# Patient Record
Sex: FEMALE | Race: WHITE | Employment: UNEMPLOYED | URBAN - METROPOLITAN AREA
[De-identification: names, ages, dates, MRNs, and addresses within clinical notes are randomized per-mention and may not be internally consistent; named-entity substitution may affect disease eponyms.]

---

## 2020-02-24 ENCOUNTER — HOSPITAL ENCOUNTER (OUTPATIENT)
Age: 35
Setting detail: SPECIMEN
Discharge: HOME | End: 2020-02-24

## 2020-02-24 ENCOUNTER — OFFICE VISIT (OUTPATIENT)
Dept: OBGYN CLINIC | Age: 35
End: 2020-02-24

## 2020-02-24 VITALS
SYSTOLIC BLOOD PRESSURE: 118 MMHG | HEIGHT: 67 IN | BODY MASS INDEX: 45.99 KG/M2 | DIASTOLIC BLOOD PRESSURE: 80 MMHG | WEIGHT: 293 LBS

## 2020-02-24 LAB
-: ABNORMAL
AMORPHOUS: ABNORMAL
BACTERIA: ABNORMAL
BILIRUBIN URINE: ABNORMAL
CASTS UA: ABNORMAL /LPF
COLOR: ABNORMAL
COMMENT UA: ABNORMAL
CRYSTALS, UA: ABNORMAL /HPF
EPITHELIAL CELLS UA: ABNORMAL /HPF
GLUCOSE URINE: NEGATIVE
KETONES, URINE: NEGATIVE
LEUKOCYTE ESTERASE, URINE: NEGATIVE
MUCUS: ABNORMAL
NITRITE, URINE: NEGATIVE
OTHER OBSERVATIONS UA: ABNORMAL
PH UA: 5.5 (ref 5–8)
PROTEIN UA: NEGATIVE
RBC UA: ABNORMAL /HPF
RENAL EPITHELIAL, UA: ABNORMAL /HPF
SPECIFIC GRAVITY UA: 1.03 (ref 1–1.03)
TRICHOMONAS: ABNORMAL
TURBIDITY: CLEAR
URINE HGB: NEGATIVE
UROBILINOGEN, URINE: NORMAL
WBC UA: ABNORMAL /HPF
YEAST: ABNORMAL

## 2020-02-24 PROCEDURE — 81001 URINALYSIS AUTO W/SCOPE: CPT

## 2020-02-24 PROCEDURE — 99385 PREV VISIT NEW AGE 18-39: CPT | Performed by: NURSE PRACTITIONER

## 2020-02-24 PROCEDURE — G0145 SCR C/V CYTO,THINLAYER,RESCR: HCPCS

## 2020-02-24 PROCEDURE — 87624 HPV HI-RISK TYP POOLED RSLT: CPT

## 2020-02-24 ASSESSMENT — PATIENT HEALTH QUESTIONNAIRE - PHQ9
SUM OF ALL RESPONSES TO PHQ QUESTIONS 1-9: 0
SUM OF ALL RESPONSES TO PHQ9 QUESTIONS 1 & 2: 0
2. FEELING DOWN, DEPRESSED OR HOPELESS: 0
SUM OF ALL RESPONSES TO PHQ QUESTIONS 1-9: 0
1. LITTLE INTEREST OR PLEASURE IN DOING THINGS: 0

## 2020-02-24 NOTE — PROGRESS NOTES
Past Surgical History:   Procedure Laterality Date     SECTION  2005     SECTION  2008    CHOLECYSTECTOMY  2000    KNEE SURGERY Left 2012    SHOULDER SURGERY Right 2017    tendon tear    WISDOM TOOTH EXTRACTION       Family History   Problem Relation Age of Onset    Diabetes Maternal Grandmother     Cervical Cancer Maternal Grandmother     Diabetes Mother      Social History     Socioeconomic History    Marital status:      Spouse name: Not on file    Number of children: Not on file    Years of education: Not on file    Highest education level: Not on file   Occupational History    Not on file   Social Needs    Financial resource strain: Not on file    Food insecurity:     Worry: Not on file     Inability: Not on file    Transportation needs:     Medical: Not on file     Non-medical: Not on file   Tobacco Use    Smoking status: Never Smoker    Smokeless tobacco: Never Used   Substance and Sexual Activity    Alcohol use: Not Currently    Drug use: Never    Sexual activity: Yes     Partners: Male   Lifestyle    Physical activity:     Days per week: Not on file     Minutes per session: Not on file    Stress: Not on file   Relationships    Social connections:     Talks on phone: Not on file     Gets together: Not on file     Attends Yarsani service: Not on file     Active member of club or organization: Not on file     Attends meetings of clubs or organizations: Not on file     Relationship status: Not on file    Intimate partner violence:     Fear of current or ex partner: Not on file     Emotionally abused: Not on file     Physically abused: Not on file     Forced sexual activity: Not on file   Other Topics Concern    Not on file   Social History Narrative    Not on file       MEDICATIONS:  No current outpatient medications on file. No current facility-administered medications for this visit.             ALLERGIES:  Allergies as of 02/24/2020 - Review Complete 02/24/2020   Allergen Reaction Noted    Azithromycin Anaphylaxis 11/20/2019    Penicillins Anaphylaxis and Hives 11/20/2019    Propoxyphene Anaphylaxis 11/20/2019    Bupropion Hives 11/20/2019    Cephalexin  11/20/2019    Levofloxacin Hives 11/20/2019    Paroxetine hcl Hives 11/20/2019    Rofecoxib  11/20/2019    Zoloft [sertraline hcl]  02/24/2020       Symptoms of decreased mood absent  Symptoms of anhedonia absent    **If either question is answered in a  positive fashion then complete the PHQ9 Scoring Evaluation and make the appropriate referral**      Immunization status: stated as current, but no records available. Gynecologic History:  Menarche: 15 yo  Menopause at [address] [place] yo     Patient's last menstrual period was 02/20/2020. Sexually Active: Yes    STD History: No     Permanent Sterilization: No   Reversible Birth Control: No        Hormone Replacement Exposure: No      Genetic Qualified Family History of Breast, Ovarian , Colon or Uterine Cancer: No     If YES see scanned worksheet. Preventative Health Testing:    Health Maintenance:  Health Maintenance Due   Topic Date Due    Varicella vaccine (1 of 2 - 2-dose childhood series) 03/18/1986    DTaP/Tdap/Td vaccine (1 - Tdap) 03/18/1996    HIV screen  03/18/2000    Cervical cancer screen  03/18/2006    Flu vaccine (1) 09/01/2019       Date of Last Pap Smear: 11 years ago  Abnormal Pap Smear History: denies- no records available  Colposcopy History:   Date of Last Mammogram: NA  Date of Last Colonoscopy:   Date of Last Bone Density:      ________________________________________________________________________        REVIEW OF SYSTEMS:    yes   A minimum of an eleven point review of systems was completed. Review Of Systems (11 point):  Constitutional: No fever, chills or malaise;  No weight change or fatigue  Head and Eyes: No vision, Headache, Dizziness or trauma in last 12 months  ENT ROS: No hearing, Orders   1. Visit for gynecologic examination  PAP SMEAR   2. Screening for HPV (human papillomavirus)  PAP SMEAR   3. Menorrhagia with irregular cycle  US Pelvis Complete    US Non OB Transvaginal    CBC Auto Differential    TSH with Reflex    HCG, Quantitative, Pregnancy    APTT    Protime-INR    Follicle Stimulating Hormone    Luteinizing Hormone    Glucose, Fasting    Insulin, total    ALT    AST    BUN & Creatinine   4. UTI symptoms  Urinalysis Reflex to Culture   5. Abnormal skin growth            Chief Complaint   Patient presents with    Gynecologic Exam          Past Medical History:   Diagnosis Date    Arthritis     Disc disease, degenerative, lumbar or lumbosacral 2002    Fibromyalgia 2012    Migraines          There are no active problems to display for this patient. Hereditary Breast, Ovarian, Colon and Uterine Cancer screening Done. Tobacco & Secondary smoke risks reviewed; instructed on cessation and avoidance      Counseling Completed:  Preventative Health Recommendations and Follow up. The patient was informed of the recommended preventative health recommendations. 1. Annuals every year; Cytology collections per prevailing guidelines. 2. Mammograms begin every year at 37 yo if no abnormalities are found and no family     History. 3. Bone density studies every 2-3 years. Begin at 73 yo. If no fracture history or osteoporosis family history. (significant). 4. Colonoscopy begin at 40 yo. Repeat every ten years if negative and no family history. 5. Calcium of 7311-6583 mg/day in split dosing  6. Vitamin D 400-800 IU/day  7. All other preventative health recommendations will be managed by the patients Primary care physician. PLAN:  Return in about 4 weeks (around 3/23/2020) for DR Rodríguez/ heavy menses/evaluate removal of mass. Pap smear obtained. Pelvic ultrasound ordered. Lab slips given.   Follow up with physician to discuss irregular menses and to

## 2020-02-26 LAB
HPV SAMPLE: NORMAL
HPV, GENOTYPE 16: NOT DETECTED
HPV, GENOTYPE 18: NOT DETECTED
HPV, HIGH RISK OTHER: NOT DETECTED
HPV, INTERPRETATION: NORMAL
SPECIMEN DESCRIPTION: NORMAL

## 2020-03-03 LAB — CYTOLOGY REPORT: NORMAL

## 2020-06-09 ENCOUNTER — HOSPITAL ENCOUNTER (OUTPATIENT)
Age: 35
Discharge: HOME | End: 2020-06-09
Payer: COMMERCIAL

## 2020-06-09 LAB
ABSOLUTE EOS #: 0.19 K/UL (ref 0–0.44)
ABSOLUTE IMMATURE GRANULOCYTE: 0.05 K/UL (ref 0–0.3)
ABSOLUTE LYMPH #: 3.46 K/UL (ref 1.1–3.7)
ABSOLUTE MONO #: 0.75 K/UL (ref 0.1–1.2)
ALT SERPL-CCNC: 12 U/L (ref 5–33)
AST SERPL-CCNC: 10 U/L
BASOPHILS # BLD: 0 % (ref 0–2)
BASOPHILS ABSOLUTE: 0.05 K/UL (ref 0–0.2)
BUN BLDV-MCNC: 11 MG/DL (ref 6–20)
CREAT SERPL-MCNC: 0.66 MG/DL (ref 0.5–0.9)
DIFFERENTIAL TYPE: ABNORMAL
EOSINOPHILS RELATIVE PERCENT: 2 % (ref 1–4)
FOLLICLE STIMULATING HORMONE: 4.3 U/L (ref 1.7–21.5)
GFR AFRICAN AMERICAN: >60 ML/MIN
GFR NON-AFRICAN AMERICAN: >60 ML/MIN
GFR SERPL CREATININE-BSD FRML MDRD: NORMAL ML/MIN/{1.73_M2}
GFR SERPL CREATININE-BSD FRML MDRD: NORMAL ML/MIN/{1.73_M2}
GLUCOSE FASTING: 150 MG/DL (ref 70–99)
HCG QUANTITATIVE: <1 IU/L
HCT VFR BLD CALC: 41.2 % (ref 36.3–47.1)
HEMOGLOBIN: 12.2 G/DL (ref 11.9–15.1)
IMMATURE GRANULOCYTES: 0 %
INR BLD: 0.9
INSULIN COMMENT: NORMAL
INSULIN REFERENCE RANGE:: NORMAL
INSULIN: 54.3 MU/L
LH: 4.5 U/L (ref 1–95.6)
LYMPHOCYTES # BLD: 27 % (ref 24–43)
MCH RBC QN AUTO: 24.8 PG (ref 25.2–33.5)
MCHC RBC AUTO-ENTMCNC: 29.6 G/DL (ref 28.4–34.8)
MCV RBC AUTO: 83.9 FL (ref 82.6–102.9)
MONOCYTES # BLD: 6 % (ref 3–12)
NRBC AUTOMATED: 0 PER 100 WBC
PARTIAL THROMBOPLASTIN TIME: 29.8 SEC (ref 21.3–31.3)
PDW BLD-RTO: 15.2 % (ref 11.8–14.4)
PLATELET # BLD: 332 K/UL (ref 138–453)
PLATELET ESTIMATE: ABNORMAL
PMV BLD AUTO: 9.2 FL (ref 8.1–13.5)
PROTHROMBIN TIME: 9.5 SEC (ref 9.4–12.6)
RBC # BLD: 4.91 M/UL (ref 3.95–5.11)
RBC # BLD: ABNORMAL 10*6/UL
SEG NEUTROPHILS: 65 % (ref 36–65)
SEGMENTED NEUTROPHILS ABSOLUTE COUNT: 8.15 K/UL (ref 1.5–8.1)
TSH SERPL DL<=0.05 MIU/L-ACNC: 1.13 MIU/L (ref 0.3–5)
WBC # BLD: 12.7 K/UL (ref 3.5–11.3)
WBC # BLD: ABNORMAL 10*3/UL

## 2020-06-09 PROCEDURE — 84702 CHORIONIC GONADOTROPIN TEST: CPT

## 2020-06-09 PROCEDURE — 84443 ASSAY THYROID STIM HORMONE: CPT

## 2020-06-09 PROCEDURE — 84460 ALANINE AMINO (ALT) (SGPT): CPT

## 2020-06-09 PROCEDURE — 83525 ASSAY OF INSULIN: CPT

## 2020-06-09 PROCEDURE — 36415 COLL VENOUS BLD VENIPUNCTURE: CPT

## 2020-06-09 PROCEDURE — 83002 ASSAY OF GONADOTROPIN (LH): CPT

## 2020-06-09 PROCEDURE — 84520 ASSAY OF UREA NITROGEN: CPT

## 2020-06-09 PROCEDURE — 84450 TRANSFERASE (AST) (SGOT): CPT

## 2020-06-09 PROCEDURE — 85025 COMPLETE CBC W/AUTO DIFF WBC: CPT

## 2020-06-09 PROCEDURE — 82565 ASSAY OF CREATININE: CPT

## 2020-06-09 PROCEDURE — 82947 ASSAY GLUCOSE BLOOD QUANT: CPT

## 2020-06-09 PROCEDURE — 85610 PROTHROMBIN TIME: CPT

## 2020-06-09 PROCEDURE — 83001 ASSAY OF GONADOTROPIN (FSH): CPT

## 2020-06-09 PROCEDURE — 85730 THROMBOPLASTIN TIME PARTIAL: CPT

## 2020-06-10 ENCOUNTER — TELEPHONE (OUTPATIENT)
Dept: OBGYN CLINIC | Age: 35
End: 2020-06-10

## 2020-06-11 ENCOUNTER — TELEPHONE (OUTPATIENT)
Dept: OBGYN CLINIC | Age: 35
End: 2020-06-11

## 2020-06-11 NOTE — TELEPHONE ENCOUNTER
----- Message from Jannet Severs, APRN - CNP sent at 6/10/2020  7:36 AM EDT -----  Elevated fasting glucose  Please order hgb a1c. Probable insulin resistant. Please fax results to her PCP and have patient follow up with PCP. If patient is still having heavy irregular menses- have patient complete pelvic ultrasound as ordered previously and set up appointment with physician to discuss plan of care.

## 2020-06-22 ENCOUNTER — OFFICE VISIT (OUTPATIENT)
Dept: OBGYN CLINIC | Age: 35
End: 2020-06-22
Payer: COMMERCIAL

## 2020-06-22 PROCEDURE — 76856 US EXAM PELVIC COMPLETE: CPT | Performed by: OBSTETRICS & GYNECOLOGY

## 2020-06-22 PROCEDURE — 76830 TRANSVAGINAL US NON-OB: CPT | Performed by: OBSTETRICS & GYNECOLOGY

## 2020-07-07 ENCOUNTER — OFFICE VISIT (OUTPATIENT)
Dept: FAMILY MEDICINE CLINIC | Age: 35
End: 2020-07-07
Payer: COMMERCIAL

## 2020-07-07 VITALS
SYSTOLIC BLOOD PRESSURE: 124 MMHG | WEIGHT: 293 LBS | HEIGHT: 67 IN | DIASTOLIC BLOOD PRESSURE: 85 MMHG | BODY MASS INDEX: 45.99 KG/M2 | HEART RATE: 95 BPM | RESPIRATION RATE: 16 BRPM | TEMPERATURE: 97.4 F

## 2020-07-07 PROBLEM — G89.29 CHRONIC LOW BACK PAIN WITHOUT SCIATICA: Status: ACTIVE | Noted: 2020-07-07

## 2020-07-07 PROBLEM — Z90.49 HX LAPAROSCOPIC CHOLECYSTECTOMY: Status: ACTIVE | Noted: 2020-07-07

## 2020-07-07 PROBLEM — E28.2 PCOS (POLYCYSTIC OVARIAN SYNDROME): Status: ACTIVE | Noted: 2020-07-07

## 2020-07-07 PROBLEM — Z98.891 HX OF CESAREAN SECTION: Status: ACTIVE | Noted: 2020-07-07

## 2020-07-07 PROBLEM — Z83.3 FAMILY HISTORY OF DIABETES MELLITUS (DM): Status: ACTIVE | Noted: 2020-07-07

## 2020-07-07 PROBLEM — Z80.0 FAMILY HISTORY OF COLON CANCER IN FATHER: Status: ACTIVE | Noted: 2020-07-07

## 2020-07-07 PROBLEM — F41.9 ANXIETY: Status: ACTIVE | Noted: 2020-07-07

## 2020-07-07 PROBLEM — K08.409 HISTORY OF THIRD MOLAR TOOTH EXTRACTION: Status: ACTIVE | Noted: 2020-07-07

## 2020-07-07 PROBLEM — G43.009 MIGRAINE WITHOUT AURA AND WITHOUT STATUS MIGRAINOSUS, NOT INTRACTABLE: Status: ACTIVE | Noted: 2020-07-07

## 2020-07-07 PROBLEM — M54.50 CHRONIC LOW BACK PAIN WITHOUT SCIATICA: Status: ACTIVE | Noted: 2020-07-07

## 2020-07-07 LAB — HBA1C MFR BLD: 7 %

## 2020-07-07 PROCEDURE — 99204 OFFICE O/P NEW MOD 45 MIN: CPT | Performed by: PHYSICIAN ASSISTANT

## 2020-07-07 PROCEDURE — 1036F TOBACCO NON-USER: CPT | Performed by: PHYSICIAN ASSISTANT

## 2020-07-07 PROCEDURE — G8427 DOCREV CUR MEDS BY ELIG CLIN: HCPCS | Performed by: PHYSICIAN ASSISTANT

## 2020-07-07 PROCEDURE — 83036 HEMOGLOBIN GLYCOSYLATED A1C: CPT | Performed by: PHYSICIAN ASSISTANT

## 2020-07-07 PROCEDURE — G8417 CALC BMI ABV UP PARAM F/U: HCPCS | Performed by: PHYSICIAN ASSISTANT

## 2020-07-07 RX ORDER — MELOXICAM 15 MG/1
TABLET ORAL
COMMUNITY
End: 2020-10-08 | Stop reason: ALTCHOICE

## 2020-07-07 RX ORDER — CETIRIZINE HYDROCHLORIDE 10 MG/1
CAPSULE, LIQUID FILLED ORAL
COMMUNITY
End: 2020-08-05

## 2020-07-07 RX ORDER — CYCLOBENZAPRINE HCL 10 MG
TABLET ORAL
COMMUNITY
End: 2021-03-01 | Stop reason: SDUPTHER

## 2020-07-07 RX ORDER — AMITRIPTYLINE HYDROCHLORIDE 10 MG/1
TABLET, FILM COATED ORAL
COMMUNITY
End: 2020-08-05

## 2020-07-07 RX ORDER — SUMATRIPTAN 100 MG/1
TABLET, FILM COATED ORAL
COMMUNITY
End: 2021-08-13 | Stop reason: SDUPTHER

## 2020-07-07 RX ORDER — CITALOPRAM 20 MG/1
TABLET ORAL
COMMUNITY
End: 2020-08-05

## 2020-07-07 RX ORDER — HYDROCODONE BITARTRATE AND ACETAMINOPHEN 10; 325 MG/1; MG/1
TABLET ORAL
COMMUNITY
End: 2021-03-01 | Stop reason: ALTCHOICE

## 2020-07-07 SDOH — ECONOMIC STABILITY: FOOD INSECURITY: WITHIN THE PAST 12 MONTHS, YOU WORRIED THAT YOUR FOOD WOULD RUN OUT BEFORE YOU GOT MONEY TO BUY MORE.: NEVER TRUE

## 2020-07-07 SDOH — ECONOMIC STABILITY: FOOD INSECURITY: WITHIN THE PAST 12 MONTHS, THE FOOD YOU BOUGHT JUST DIDN'T LAST AND YOU DIDN'T HAVE MONEY TO GET MORE.: NEVER TRUE

## 2020-07-07 SDOH — ECONOMIC STABILITY: INCOME INSECURITY: HOW HARD IS IT FOR YOU TO PAY FOR THE VERY BASICS LIKE FOOD, HOUSING, MEDICAL CARE, AND HEATING?: NOT HARD AT ALL

## 2020-07-07 SDOH — ECONOMIC STABILITY: TRANSPORTATION INSECURITY
IN THE PAST 12 MONTHS, HAS THE LACK OF TRANSPORTATION KEPT YOU FROM MEDICAL APPOINTMENTS OR FROM GETTING MEDICATIONS?: NO

## 2020-07-07 SDOH — ECONOMIC STABILITY: TRANSPORTATION INSECURITY
IN THE PAST 12 MONTHS, HAS LACK OF TRANSPORTATION KEPT YOU FROM MEETINGS, WORK, OR FROM GETTING THINGS NEEDED FOR DAILY LIVING?: NO

## 2020-07-07 ASSESSMENT — ENCOUNTER SYMPTOMS
RHINORRHEA: 0
PHOTOPHOBIA: 0
SHORTNESS OF BREATH: 0
CHEST TIGHTNESS: 0
SINUS PAIN: 0
SORE THROAT: 0
VOMITING: 0
SINUS PRESSURE: 0
EYE REDNESS: 0
ANAL BLEEDING: 0
COUGH: 0
BLOOD IN STOOL: 0
COLOR CHANGE: 0
BACK PAIN: 1
RECTAL PAIN: 0
NAUSEA: 0
WHEEZING: 0
DIARRHEA: 0
CONSTIPATION: 0
TROUBLE SWALLOWING: 0
ABDOMINAL DISTENTION: 0
ABDOMINAL PAIN: 0

## 2020-07-07 NOTE — PROGRESS NOTES
[address] PRIMARY CARE  [address] [place] [address] [address] [place] [address]  Dept: [phone]  Dept Fax: [phone]    New Patient Visit Note  Date of patient's visit: 7/7/2020  Patient's Name:  Justo Meyer YOB: 1985            [facility] [place] [address] [place]  ______________________________________________________________________    Reason for visit: Establish care/Preventative care  ______________________________________________________________________  Chief Compliant   Establish Care      ______________________________________________________________________  History of Presenting Illness:  History was obtained from the patient. Justo Meyer is a 28 y.o. is here to establish care. Chronic back pain, PCOS, anxiety, migraines, morbid obesity. Patient has had chronic back pain which is unchanged. She has been taking Flexeril and hydrocodone consistently with some relief. She denies any loss of bladder or bowel control or numbness tingling or weakness in the lower extremities. Patient has not done physical therapy, back injections or surgery. She has not had any MRI or other imaging studies. Patient is requesting to continue on her pain medication. We discussed options and patient is agreeable to continuing Flexeril and Mobic and referral to pain management. Patient states that she takes Imitrex for her migraines 2-3 times a week. She states she has never been on a prophylactic medication for her migraines and does not wish to start 1 at this time. Patient is being followed by GYN in regards to concerns for PCOS, abnormal vaginal bleeding with recent ultrasound which showed a uterine polyp. Patient was previously put on metformin which she states she took intermittently for a couple of months but did not feel like it was helping and stopped taking it several months ago.   Patient complains of heavy vaginal bleeding with clots as well as hirsutism and difficulty losing weight. Patient has tried multiple medications and diets for weight loss without results. Patient would like a referral to see bariatric surgeon. Patient states that her anxiety is well controlled with her medications. She denies any suicidal or homicidal ideations or plans. Affect is good. Blood work recently performed by GYN and reviewed by me. Patient's A1c is 7.0 in the office today and patient is agreeable to restarting metformin and following up in 3 months for repeat hemoglobin A1c.    ______________________________________________________________________  Past Medical/Surgical History:        Diagnosis Date    Anxiety     Arthritis     Bipolar disorder ([address]     Depression     Disc disease, degenerative, lumbar or lumbosacral 2002    Disc disease, degenerative, lumbar or lumbosacral     Fibromyalgia     GERD (gastroesophageal reflux disease)     IBS (irritable bowel syndrome)     Migraines            Procedure Laterality Date     SECTION  2005     SECTION  2008    CHOLECYSTECTOMY  2000    KNEE SURGERY Left 2012    SHOULDER SURGERY Right 2017    tendon tear    WISDOM TOOTH EXTRACTION         ______________________________________________________________________  Health Maintenance Due   Topic Date Due    DTaP/Tdap/Td vaccine (1 - Tdap) 2004       Allergies   Allergen Reactions    Azithromycin Anaphylaxis    Bee Venom Anaphylaxis    Penicillins Anaphylaxis and Hives    Propoxyphene Anaphylaxis    Bupropion Hives    Cephalexin     Levofloxacin Hives    Paroxetine Hcl Hives    Rofecoxib     Zoloft [Sertraline Hcl]          Current Outpatient Medications   Medication Sig Dispense Refill    amitriptyline (ELAVIL) 10 MG tablet amitriptyline 10 mg tablet   TAKE 1 TABLET BY MOUTH AT BEDTIME      Cetirizine HCl (ZYRTEC ALLERGY) 10 MG CAPS Zyrtec 10 mg capsule   Take 1 capsule every day by oral route.       citalopram (CELEXA) 20 MG tablet citalopram 20 mg tablet   TAKE 1 2 TABLET EVERY DAY FOR 1 WEEK, THEN INCREASE TO 1 TABLET DAILY      cyclobenzaprine (FLEXERIL) 10 MG tablet cyclobenzaprine 10 mg tablet   TAKE 1 TABLET BY MOUTH AT BEDTIME AS NEEDED FOR MUSCLE PAIN      HYDROcodone-acetaminophen (NORCO)  MG per tablet hydrocodone 10 mg-acetaminophen 325 mg tablet   TAKE 1 TABLET BY MOUTH 4 TIMES DAILY AS NEEDED FOR PAIN      meloxicam (MOBIC) 15 MG tablet meloxicam 15 mg tablet      SUMAtriptan (IMITREX) 100 MG tablet sumatriptan 100 mg tablet   TAKE 1 TABLET BY MOUTH ONCE AT ONSET, THEN REPEAT IN 1 HOUR FOR 1 DOSE IF STILL ONGOING.  metFORMIN (GLUCOPHAGE) 500 MG tablet Take 1 tablet by mouth 2 times daily (with meals) 60 tablet 5     No current facility-administered medications for this visit. Social History     Tobacco Use    Smoking status: Never Smoker    Smokeless tobacco: Never Used   Substance Use Topics    Alcohol use: Not Currently    Drug use: Never       Family History   Problem Relation Age of Onset    Diabetes Maternal Grandmother     Cervical Cancer Maternal Grandmother     Diabetes Mother       ______________________________________________________________________  Review of Systems   Constitutional: Negative for appetite change, chills, diaphoresis, fatigue, fever and unexpected weight change. HENT: Negative for congestion, ear discharge, ear pain, postnasal drip, rhinorrhea, sinus pressure, sinus pain, sore throat, tinnitus and trouble swallowing. Eyes: Negative for photophobia, redness and visual disturbance. Respiratory: Negative for cough, chest tightness, shortness of breath and wheezing. Cardiovascular: Negative for chest pain, palpitations and leg swelling. Gastrointestinal: Negative for abdominal distention, abdominal pain, anal bleeding, blood in stool, constipation, diarrhea, nausea, rectal pain and vomiting. Endocrine: Negative.     Genitourinary: Positive for menstrual problem and vaginal bleeding. Negative for decreased urine volume, difficulty urinating, dysuria, flank pain, frequency, hematuria, pelvic pain, urgency, vaginal discharge and vaginal pain. Musculoskeletal: Positive for arthralgias and back pain. Negative for gait problem, joint swelling, myalgias, neck pain and neck stiffness. Skin: Negative for color change, pallor and rash. Neurological: Negative for dizziness, syncope, weakness, light-headedness, numbness and headaches. Hematological: Negative for adenopathy. Does not bruise/bleed easily. Psychiatric/Behavioral: Negative for agitation, behavioral problems, confusion, decreased concentration, dysphoric mood, hallucinations, self-injury, sleep disturbance and suicidal ideas. The patient is nervous/anxious. The patient is not hyperactive.        ______________________________________________________________________  Physical Exam  Vitals signs and nursing note reviewed. Constitutional:       General: She is not in acute distress. Appearance: Normal appearance. She is well-developed and well-groomed. She is morbidly obese. She is not ill-appearing, toxic-appearing or diaphoretic. HENT:      Head: Normocephalic and atraumatic. Right Ear: Tympanic membrane, ear canal and external ear normal.      Left Ear: Tympanic membrane, ear canal and external ear normal.      Nose: Nose normal.      Mouth/Throat:      Lips: Pink. Mouth: Mucous membranes are moist.      Pharynx: Oropharynx is clear. Uvula midline. No oropharyngeal exudate or posterior oropharyngeal erythema. Tonsils: No tonsillar exudate or tonsillar abscesses. Eyes:      General: Lids are normal. No scleral icterus. Right eye: No discharge. Left eye: No discharge. Extraocular Movements: Extraocular movements intact. Conjunctiva/sclera: Conjunctivae normal.      Pupils: Pupils are equal, round, and reactive to light.    Neck:      Musculoskeletal: Full passive range of motion without pain, normal range of motion and neck supple. Thyroid: No thyroid mass, thyromegaly or thyroid tenderness. Vascular: No JVD. Trachea: No tracheal deviation. Cardiovascular:      Rate and Rhythm: Normal rate and regular rhythm. Heart sounds: Normal heart sounds, S1 normal and S2 normal. No murmur. No friction rub. No gallop. Pulmonary:      Effort: Pulmonary effort is normal. No respiratory distress. Breath sounds: Normal breath sounds and air entry. No stridor, decreased air movement or transmitted upper airway sounds. No decreased breath sounds, wheezing, rhonchi or rales. Chest:      Chest wall: No tenderness. Abdominal:      General: Abdomen is flat. Bowel sounds are normal. There is no distension. Palpations: Abdomen is soft. There is no mass. Tenderness: There is no abdominal tenderness. There is no right CVA tenderness, left CVA tenderness, guarding or rebound. Musculoskeletal: Normal range of motion. General: No tenderness. Lymphadenopathy:      Head:      Right side of head: No submental, submandibular, tonsillar, preauricular, posterior auricular or occipital adenopathy. Left side of head: No submental, submandibular, tonsillar, preauricular or posterior auricular adenopathy. Cervical: No cervical adenopathy. Right cervical: No superficial, deep or posterior cervical adenopathy. Left cervical: No superficial, deep or posterior cervical adenopathy. Upper Body:      Right upper body: No supraclavicular adenopathy. Left upper body: No supraclavicular adenopathy. Skin:     General: Skin is warm and dry. Coloration: Skin is not pale. Findings: No erythema or rash. Neurological:      General: No focal deficit present. Mental Status: She is alert and oriented to person, place, and time. Cranial Nerves: Cranial nerves are intact. No cranial nerve deficit.       Sensory:

## 2020-07-21 ENCOUNTER — OFFICE VISIT (OUTPATIENT)
Dept: OBGYN CLINIC | Age: 35
End: 2020-07-21
Payer: COMMERCIAL

## 2020-07-21 VITALS
HEART RATE: 96 BPM | DIASTOLIC BLOOD PRESSURE: 72 MMHG | WEIGHT: 293 LBS | SYSTOLIC BLOOD PRESSURE: 120 MMHG | TEMPERATURE: 98.8 F | HEIGHT: 67 IN | BODY MASS INDEX: 45.99 KG/M2

## 2020-07-21 PROBLEM — E88.81 INSULIN RESISTANCE: Status: ACTIVE | Noted: 2020-07-21

## 2020-07-21 PROBLEM — N92.1 MENORRHAGIA WITH IRREGULAR CYCLE: Status: ACTIVE | Noted: 2020-07-21

## 2020-07-21 PROBLEM — E88.819 INSULIN RESISTANCE: Status: ACTIVE | Noted: 2020-07-21

## 2020-07-21 PROBLEM — N85.2 BULKY OR ENLARGED UTERUS: Status: ACTIVE | Noted: 2020-07-21

## 2020-07-21 PROBLEM — R93.89 ENDOMETRIAL THICKENING ON ULTRASOUND: Status: ACTIVE | Noted: 2020-07-21

## 2020-07-21 PROBLEM — E11.9 TYPE 2 DIABETES MELLITUS WITHOUT COMPLICATION, WITHOUT LONG-TERM CURRENT USE OF INSULIN (HCC): Status: ACTIVE | Noted: 2020-07-21

## 2020-07-21 PROCEDURE — 3051F HG A1C>EQUAL 7.0%<8.0%: CPT | Performed by: OBSTETRICS & GYNECOLOGY

## 2020-07-21 PROCEDURE — 1036F TOBACCO NON-USER: CPT | Performed by: OBSTETRICS & GYNECOLOGY

## 2020-07-21 PROCEDURE — G8417 CALC BMI ABV UP PARAM F/U: HCPCS | Performed by: OBSTETRICS & GYNECOLOGY

## 2020-07-21 PROCEDURE — G8427 DOCREV CUR MEDS BY ELIG CLIN: HCPCS | Performed by: OBSTETRICS & GYNECOLOGY

## 2020-07-21 PROCEDURE — 2022F DILAT RTA XM EVC RTNOPTHY: CPT | Performed by: OBSTETRICS & GYNECOLOGY

## 2020-07-21 PROCEDURE — 99214 OFFICE O/P EST MOD 30 MIN: CPT | Performed by: OBSTETRICS & GYNECOLOGY

## 2020-07-21 NOTE — PROGRESS NOTES
Krystle Cardoso  2020      Krystle Cardoso is a 28 y.o. female [id number]      The patient was seen today. She was here to follow-up regarding her labs and diagnostics ordered at her last visit for the diagnosis of:    ICD-10-CM    1. Mass of buttocks 5*2 cm firm  R22.9    2. Menorrhagia with irregular cycle  N92.1    3. Family history of diabetes mellitus (DM)  Z83.3    4. Type 2 diabetes mellitus without complication, without long-term current use of insulin (HCC)  E11.9    5. Bulky or enlarged uterus  N85.2    6. Endometrial thickening on ultrasound  R93.89    7. Insulin resistance  E88.81        Her bowels are regular and she is voiding without difficulty. Large fleshy mass right buttocks erosive. Firm . Pt states changing in size, doubling over the last 12 months. Pt states present for over 11 years.       Past Medical History:   Diagnosis Date    Anxiety     Arthritis     Bipolar disorder ([facility] [address]     Depression     Disc disease, degenerative, lumbar or lumbosacral 2002    Disc disease, degenerative, lumbar or lumbosacral     Fibromyalgia     GERD (gastroesophageal reflux disease)     IBS (irritable bowel syndrome)     Migraines          Past Surgical History:   Procedure Laterality Date     SECTION  2005     SECTION  2008    CHOLECYSTECTOMY  2000    KNEE SURGERY Left 2012    SHOULDER SURGERY Right 2017    tendon tear    WISDOM TOOTH EXTRACTION           Family History   Problem Relation Age of Onset    Diabetes Maternal Grandmother     Cervical Cancer Maternal Grandmother     Diabetes Mother          Social History     Tobacco Use    Smoking status: Never Smoker    Smokeless tobacco: Never Used   Substance Use Topics    Alcohol use: Not Currently    Drug use: Never         MEDICATIONS:  Current Outpatient Medications   Medication Sig Dispense Refill    amitriptyline (ELAVIL) 10 MG tablet amitriptyline 10 mg tablet   TAKE 1 TABLET BY MOUTH AT BEDTIME      Cetirizine HCl (ZYRTEC ALLERGY) 10 MG CAPS Zyrtec 10 mg capsule   Take 1 capsule every day by oral route.  citalopram (CELEXA) 20 MG tablet citalopram 20 mg tablet   TAKE 1 2 TABLET EVERY DAY FOR 1 WEEK, THEN INCREASE TO 1 TABLET DAILY      cyclobenzaprine (FLEXERIL) 10 MG tablet cyclobenzaprine 10 mg tablet   TAKE 1 TABLET BY MOUTH AT BEDTIME AS NEEDED FOR MUSCLE PAIN      HYDROcodone-acetaminophen (NORCO)  MG per tablet hydrocodone 10 mg-acetaminophen 325 mg tablet   TAKE 1 TABLET BY MOUTH 4 TIMES DAILY AS NEEDED FOR PAIN      meloxicam (MOBIC) 15 MG tablet meloxicam 15 mg tablet      SUMAtriptan (IMITREX) 100 MG tablet sumatriptan 100 mg tablet   TAKE 1 TABLET BY MOUTH ONCE AT ONSET, THEN REPEAT IN 1 HOUR FOR 1 DOSE IF STILL ONGOING.  metFORMIN (GLUCOPHAGE) 500 MG tablet Take 1 tablet by mouth 2 times daily (with meals) 60 tablet 5     No current facility-administered medications for this visit. ALLERGIES:  Allergies as of 07/21/2020 - Review Complete 07/21/2020   Allergen Reaction Noted    Azithromycin Anaphylaxis 11/20/2019    Bee venom Anaphylaxis 07/07/2020    Penicillins Anaphylaxis and Hives 11/20/2019    Propoxyphene Anaphylaxis 11/20/2019    Bupropion Hives 11/20/2019    Cephalexin  11/20/2019    Levofloxacin Hives 11/20/2019    Paroxetine hcl Hives 11/20/2019    Rofecoxib  11/20/2019    Zoloft [sertraline hcl]  02/24/2020         Blood pressure 120/72, pulse 96, temperature 98.8 °F (37.1 °C), height 5' 7\" (1.702 m), weight (!) 399 lb (181 kg), last menstrual period 07/01/2020, not currently breastfeeding. Abdomen: Soft non-tender; good bowel sounds. No guarding, rebound or rigidity. No CVA tenderness bilaterally. Extremities: No calf tenderness, DTR 2/4, and No edema bilaterally; Large fleshy mass right buttocks erosive. Firm . Pt states changing in size, doubling over the last 12 months.     Pelvic: Declined         Diagnostics:  Us Non Ob Transvaginal    Result Date: 6/22/2020  GYNECOLOGY ULTRASOUND REPORT [facility]; [address] [phone] [place] [phone] Fax 6/23/2020 MRN: [id number] Contact Serial #: [id number] Tim Neal YOB: 1985 Age: 28 y.o. The ultrasound images were reviewed. Please see the attached ultrasound report. Assessment: Tim Neal is a 28 y.o. female Menorrhagia with irregular cycle Specific Ultrasound Imaging Obtained: Transabdominal Approach: Yes Transvaginal Approach: Yes Limitations of Study Encountered: Overlying bowel & gas limiting the study: Yes Poor prep for procedure limiting study: No Elevated BMI limiting study: Yes Ovaries are NOT seen on Transabdominal imaging. Transvaginal imaging required: Yes Findings: 1. The Uterus is heterogeneous and anteverted (10.65 x 8.28 x 6.85 cm) 2. The Endometrial Stripe measurement is 3.26 cm=THICKENED 3. The bilateral ovaries are not visualized on today's study 4. There is not an abnormal amount of cul-de-sac fluid Electronically signed by Laurel Stewart DO on 6/23/20 at 9:17 AM EDT     1. The Uterus is heterogeneous and anteverted (10.65 x 8.28 x 6.85 cm) 2. The Endometrial Stripe measurement is 3.26 cm=THICKENED 3. The bilateral ovaries are not visualized on today's study 4. There is not an abnormal amount of cul-de-sac fluid     Recommendations: 1. GYN Follow up 2. Endometrial sampling with hysteroscopy and probable Myosure resection of polyp/fibroid.     Us Pelvis Complete    Result Date: 6/22/2020  See transvaginal report from same day        Lab Results:  Results for orders placed or performed in visit on 07/07/20   POCT glycosylated hemoglobin (Hb A1C)   Result Value Ref Range    Hemoglobin A1C 7.0 %     Office Visit on 07/07/2020   Component Date Value Ref Range Status    Hemoglobin A1C 07/07/2020 7.0  % Final   Hospital Outpatient Visit on 06/09/2020   Component Date Value Ref Range Status    BUN 2020 11  6 - 20 mg/dL Final    CREATININE 2020 0.66  0.50 - 0.90 mg/dL Final    GFR Non- 2020 >60  >60 mL/min Final    GFR  2020 >60  >60 mL/min Final    GFR Comment 2020        Final    Comment: Average GFR for 30-36 years old:   107 mL/min/1.73sq m  Chronic Kidney Disease:   <60 mL/min/1.73sq m  Kidney failure:   <15 mL/min/1.73sq m              eGFR calculated using average adult body mass. Additional eGFR calculator available at:        [employer]            GFR Staging 2020 NOT REPORTED   Final    AST 2020 10  <32 U/L Final    ALT 2020 12  5 - 33 U/L Final    Insulin Comment 2020 NOT REPORTED   Final    Insulin 2020 54.3  mU/L Final    Insulin Reference Range: 2020        Final    Comment: Fastin.6-24.9  30 min:    60 min:  29-88  90 min:  26-84  120 min: 22-79      Glucose, Fasting 2020 150* 70 - 99 mg/dL Final    LH 2020 4.5  1.0 - 95.6 U/L Final    Comment: Reference Range:  Male:                        1.7-8.6  Ovulating Female: Follicular Phase           2.4-12.6    Ovulation Phase           14.0-95.6    Luteal Phase               1.0-11.4  Postmenopausal Female:       7.7-58.5      [facility] 2020 4.3  1.7 - 21.5 U/L Final    Comment: Reference Range:  Male:                        1. 5-12.4  Ovulating Female: Follicular Phase           3.5-12.5    Ovulation Phase            4.7-21.5    Luteal Phase               1.7-7.7  Postmenopausal Female:      25.8-134.8      Protime 2020 9.5  9.4 - 12.6 sec Final    INR 2020 0.9   Final    Comment:       Therapeutic Range:    Moderate Anticoagulant Intensity:     INR = 2.0-3.0   High Anticoagulant Intensity:     INR = 2.5-3.5            PTT 2020 29.8  21.3 - 31.3 sec Final    hCG Quant 2020 <1  <5 IU/L Final    Comment:    Non-preg premeno <=5  Postmeno           <=8  Male               <=3  If HCG results do not concur with clinical observations, additional testing to confirm   results is recommended. Elevated results not associated with pregnancy may be found in patients with other diseases   such as tumors of the germ cells (testis, ovaries, etc.), bladder, pancreas, stomach, lungs,   and liver.  TSH 06/09/2020 1.13  0.30 - 5.00 mIU/L Final    WBC 06/09/2020 12.7* 3.5 - 11.3 k/uL Final    RBC 06/09/2020 4.91  3.95 - 5.11 m/uL Final    Hemoglobin 06/09/2020 12.2  11.9 - 15.1 g/dL Final    Hematocrit 06/09/2020 41.2  36.3 - 47.1 % Final    MCV 06/09/2020 83.9  82.6 - 102.9 fL Final    MCH 06/09/2020 24.8* 25.2 - 33.5 pg Final    MCHC 06/09/2020 29.6  28.4 - 34.8 g/dL Final    RDW 06/09/2020 15.2* 11.8 - 14.4 % Final    Platelets 35/11/5160 332  138 - 453 k/uL Final    MPV 06/09/2020 9.2  8.1 - 13.5 fL Final    NRBC Automated 06/09/2020 0.0  0.0 per 100 WBC Final    Differential Type 06/09/2020 NOT REPORTED   Final    Seg Neutrophils 06/09/2020 65  36 - 65 % Final    Lymphocytes 06/09/2020 27  24 - 43 % Final    Monocytes 06/09/2020 6  3 - 12 % Final    Eosinophils % 06/09/2020 2  1 - 4 % Final    Basophils 06/09/2020 0  0 - 2 % Final    Immature Granulocytes 06/09/2020 0  0 % Final    Segs Absolute 06/09/2020 8.15* 1.50 - 8.10 k/uL Final    Absolute Lymph # 06/09/2020 3.46  1.10 - 3.70 k/uL Final    Absolute Mono # 06/09/2020 0.75  0.10 - 1.20 k/uL Final    Absolute Eos # 06/09/2020 0.19  0.00 - 0.44 k/uL Final    Basophils Absolute 06/09/2020 0.05  0.00 - 0.20 k/uL Final    Absolute Immature Granulocyte 06/09/2020 0.05  0.00 - 0.30 k/uL Final    WBC Morphology 06/09/2020 NOT REPORTED   Final    RBC Morphology 06/09/2020 ANISOCYTOSIS PRESENT   Final    Platelet Estimate 34/32/3111 NOT REPORTED   Final   ]      Assessment:   Diagnosis Orders   1. Mass of buttocks 5*2 cm firm     2.  Menorrhagia with irregular cycle 3. Family history of diabetes mellitus (DM)     4. Type 2 diabetes mellitus without complication, without long-term current use of insulin (HCC)     5. Bulky or enlarged uterus     6. Endometrial thickening on ultrasound     7. Insulin resistance       Chief Complaint   Patient presents with    Follow-up         Patient Active Problem List    Diagnosis Date Noted    Type 2 diabetes mellitus without complication, without long-term current use of insulin ([address] 2020    Menorrhagia with irregular cycle 2020    Insulin resistance 2020    Endometrial thickening on ultrasound 2020    Bulky or enlarged uterus 2020    Migraine without aura and without status migrainosus, not intractable 2020    Anxiety 2020    Hx laparoscopic cholecystectomy 2020    Hx of  section 2020    History of third molar tooth extraction 2020    PCOS (polycystic ovarian syndrome) 2020    Family history of diabetes mellitus (DM) 2020    Family history of colon cancer in father 2020    Chronic low back pain without sciatica 2020       PLAN:  Return in about 4 weeks (around 2020) for Surgical Boarding. General surgery referral for mass of right buttocks  On Metformin from PCP for INS resistance and Type II DM  Clearance required from PCP  Plan D&C Hscope Myosure for thickened endometrial lining. Return to the office in 4-6 weeks. Counseled on preventative health maintenance follow-up. No orders of the defined types were placed in this encounter. Patient was seen with total face to face time of 25 minutes. More than 50% of this visit was counseling and education regarding The primary encounter diagnosis was Mass of buttocks 5*2 cm firm.  Diagnoses of Menorrhagia with irregular cycle, Family history of diabetes mellitus (DM), Type 2 diabetes mellitus without complication, without long-term current use of insulin ([address] Bulky or enlarged uterus, Endometrial thickening on ultrasound, and Insulin resistance were also pertinent to this visit. and Follow-up   as well as  counseling on preventative health maintenance follow-up.

## 2020-08-05 ENCOUNTER — OFFICE VISIT (OUTPATIENT)
Dept: FAMILY MEDICINE CLINIC | Age: 35
End: 2020-08-05
Payer: COMMERCIAL

## 2020-08-05 VITALS
BODY MASS INDEX: 45.99 KG/M2 | HEART RATE: 79 BPM | SYSTOLIC BLOOD PRESSURE: 104 MMHG | WEIGHT: 293 LBS | HEIGHT: 67 IN | RESPIRATION RATE: 16 BRPM | DIASTOLIC BLOOD PRESSURE: 73 MMHG | TEMPERATURE: 98.4 F

## 2020-08-05 PROCEDURE — G8417 CALC BMI ABV UP PARAM F/U: HCPCS | Performed by: PHYSICIAN ASSISTANT

## 2020-08-05 PROCEDURE — 93000 ELECTROCARDIOGRAM COMPLETE: CPT | Performed by: PHYSICIAN ASSISTANT

## 2020-08-05 PROCEDURE — 99214 OFFICE O/P EST MOD 30 MIN: CPT | Performed by: PHYSICIAN ASSISTANT

## 2020-08-05 PROCEDURE — 1036F TOBACCO NON-USER: CPT | Performed by: PHYSICIAN ASSISTANT

## 2020-08-05 PROCEDURE — 90715 TDAP VACCINE 7 YRS/> IM: CPT | Performed by: PHYSICIAN ASSISTANT

## 2020-08-05 PROCEDURE — 90471 IMMUNIZATION ADMIN: CPT | Performed by: PHYSICIAN ASSISTANT

## 2020-08-05 PROCEDURE — G8427 DOCREV CUR MEDS BY ELIG CLIN: HCPCS | Performed by: PHYSICIAN ASSISTANT

## 2020-08-05 RX ORDER — ORAL SEMAGLUTIDE 3 MG/1
TABLET ORAL
Qty: 30 TABLET | Refills: 0 | COMMUNITY
Start: 2020-08-05 | End: 2020-08-27 | Stop reason: SDUPTHER

## 2020-08-05 NOTE — LETTER
[address]  [address], [address] [place] [address]  Phone: [phone]  Fax: [phone]      Medical Evaluation for Surgery     Patient Name: Tim Neal   : 1985   MRN Number:   Type of Surgery:   Surgeon:    Dear Doctor _____________________,  Patient has been medically evaluated for the above surgery. _____He/She has an acceptable low risk for the proposed surgery. _____He/She has a moderate/high risk for the proposed surgery. _____He/She needs further testing/consultation? ____Yes____No   If yes list:    Thank you for this consultation. Should you have any questions, do not hesitate to contact me.     Sincerely,    Doctor _______________________________                          Date________

## 2020-08-06 ENCOUNTER — TELEPHONE (OUTPATIENT)
Dept: FAMILY MEDICINE CLINIC | Age: 35
End: 2020-08-06

## 2020-08-06 ASSESSMENT — ENCOUNTER SYMPTOMS
BACK PAIN: 0
CONSTIPATION: 0
RECTAL PAIN: 0
ABDOMINAL DISTENTION: 0
CHEST TIGHTNESS: 0
BLOOD IN STOOL: 0
NAUSEA: 0
DIARRHEA: 0
ABDOMINAL PAIN: 0
WHEEZING: 0
COUGH: 0
SHORTNESS OF BREATH: 0
ANAL BLEEDING: 0
VOMITING: 0

## 2020-08-06 NOTE — PROGRESS NOTES
03/18/2004       Allergies   Allergen Reactions    Azithromycin Anaphylaxis    Bee Venom Anaphylaxis    Penicillins Anaphylaxis and Hives    Propoxyphene Anaphylaxis    Bupropion Hives    Cephalexin     Levofloxacin Hives    Paroxetine Hcl Hives    Rofecoxib     Zoloft [Sertraline Hcl]          Current Outpatient Medications   Medication Sig Dispense Refill    Semaglutide (RYBELSUS) 3 MG TABS Lot [id number] Exp 2/22 30 tablet 0    cyclobenzaprine (FLEXERIL) 10 MG tablet cyclobenzaprine 10 mg tablet   TAKE 1 TABLET BY MOUTH AT BEDTIME AS NEEDED FOR MUSCLE PAIN      SUMAtriptan (IMITREX) 100 MG tablet sumatriptan 100 mg tablet   TAKE 1 TABLET BY MOUTH ONCE AT ONSET, THEN REPEAT IN 1 HOUR FOR 1 DOSE IF STILL ONGOING.  metFORMIN (GLUCOPHAGE) 500 MG tablet Take 1 tablet by mouth 2 times daily (with meals) 60 tablet 5    HYDROcodone-acetaminophen (NORCO)  MG per tablet hydrocodone 10 mg-acetaminophen 325 mg tablet   TAKE 1 TABLET BY MOUTH 4 TIMES DAILY AS NEEDED FOR PAIN      meloxicam (MOBIC) 15 MG tablet meloxicam 15 mg tablet       No current facility-administered medications for this visit. Social History     Tobacco Use    Smoking status: Never Smoker    Smokeless tobacco: Never Used   Substance Use Topics    Alcohol use: Not Currently    Drug use: Never       Family History   Problem Relation Age of Onset    Diabetes Maternal Grandmother     Cervical Cancer Maternal Grandmother     Diabetes Mother         Review of Systems   Constitutional: Negative for appetite change, chills, diaphoresis, fatigue, fever and unexpected weight change. Respiratory: Negative for cough, chest tightness, shortness of breath and wheezing. Cardiovascular: Negative for chest pain, palpitations and leg swelling. Gastrointestinal: Negative for abdominal distention, abdominal pain, anal bleeding, blood in stool, constipation, diarrhea, nausea, rectal pain and vomiting.    Genitourinary: Positive for pelvic pain. Negative for difficulty urinating, dysuria, flank pain, frequency, hematuria and urgency. Musculoskeletal: Negative for back pain and myalgias. Neurological: Negative for dizziness, syncope, weakness, light-headedness and headaches. Physical Exam  Vitals signs and nursing note reviewed. Constitutional:       General: She is not in acute distress. Appearance: Normal appearance. She is well-developed and well-groomed. She is morbidly obese. She is not ill-appearing, toxic-appearing or diaphoretic. HENT:      Head: Normocephalic and atraumatic. Right Ear: Tympanic membrane, ear canal and external ear normal.      Left Ear: Tympanic membrane, ear canal and external ear normal.      Nose: Nose normal.      Mouth/Throat:      Lips: Pink. Mouth: Mucous membranes are moist.      Pharynx: Oropharynx is clear. Uvula midline. No oropharyngeal exudate or posterior oropharyngeal erythema. Tonsils: No tonsillar exudate or tonsillar abscesses. Eyes:      General: Lids are normal. No scleral icterus. Right eye: No discharge. Left eye: No discharge. Extraocular Movements: Extraocular movements intact. Conjunctiva/sclera: Conjunctivae normal.      Pupils: Pupils are equal, round, and reactive to light. Neck:      Musculoskeletal: Full passive range of motion without pain, normal range of motion and neck supple. Thyroid: No thyroid mass, thyromegaly or thyroid tenderness. Vascular: No JVD. Trachea: No tracheal deviation. Cardiovascular:      Rate and Rhythm: Normal rate and regular rhythm. Heart sounds: Normal heart sounds, S1 normal and S2 normal. No murmur. No friction rub. No gallop. Pulmonary:      Effort: Pulmonary effort is normal. No respiratory distress. Breath sounds: Normal breath sounds and air entry. No stridor, decreased air movement or transmitted upper airway sounds.  No decreased breath sounds, wheezing, rhonchi or rales. Chest:      Chest wall: No tenderness. Abdominal:      General: Abdomen is flat. Bowel sounds are normal. There is no distension. Palpations: Abdomen is soft. There is no mass. Tenderness: There is no abdominal tenderness. There is no right CVA tenderness, left CVA tenderness, guarding or rebound. Musculoskeletal: Normal range of motion. General: No tenderness. Lymphadenopathy:      Head:      Right side of head: No submental, submandibular, tonsillar, preauricular, posterior auricular or occipital adenopathy. Left side of head: No submental, submandibular, tonsillar, preauricular or posterior auricular adenopathy. Cervical: No cervical adenopathy. Right cervical: No superficial, deep or posterior cervical adenopathy. Left cervical: No superficial, deep or posterior cervical adenopathy. Upper Body:      Right upper body: No supraclavicular adenopathy. Left upper body: No supraclavicular adenopathy. Skin:     General: Skin is warm and dry. Coloration: Skin is not pale. Findings: No erythema or rash. Neurological:      General: No focal deficit present. Mental Status: She is alert and oriented to person, place, and time. Cranial Nerves: Cranial nerves are intact. No cranial nerve deficit. Sensory: Sensation is intact. Motor: Motor function is intact. Coordination: Coordination is intact. Coordination normal.      Gait: Gait is intact. Deep Tendon Reflexes: Reflexes are normal and symmetric. Psychiatric:         Attention and Perception: Attention and perception normal.         Mood and Affect: Mood and affect normal.         Speech: Speech normal.         Behavior: Behavior normal. Behavior is cooperative. Thought Content:  Thought content normal.         Cognition and Memory: Cognition and memory normal.         Judgment: Judgment normal.           Vitals:    08/05/20 1231   BP: 104/73   Site: Left Wrist   Position: Sitting   Cuff Size: Small Adult   Pulse: 79   Resp: 16   Temp: 98.4 °F (36.9 °C)   TempSrc: Temporal   Weight: (!) 400 lb (181.4 kg)   Height: 5' 7\" (1.702 m)     BP Readings from Last 3 Encounters:   08/05/20 104/73   07/21/20 120/72   07/07/20 124/85              DIAGNOSTIC FINDINGS:  CBC:  Lab Results   Component Value Date    WBC 12.7 06/09/2020    HGB 12.2 06/09/2020     06/09/2020       BMP:    Lab Results   Component Value Date    BUN 11 06/09/2020    CREATININE 0.66 06/09/2020         FASTING LIPID PANEL:No results found for: CHOL, HDL, TRIG    No results found for this visit on 08/05/20. ASSESSMENT AND PLAN:   Diagnosis Orders   1. Preoperative clearance  EKG 12 Lead    XR CHEST (2 VW)   2. Need for tetanus booster  Tdap (age 6y and older) IM (Boostrix)     Patient is here for preoperative clearance. EKG is within normal limits. Chest x-ray is ordered. Blood work reviewed from recent blood draw and normal except for slight elevation of hemoglobin A1c of 7.0. Discussed need for good control of blood sugars prior to surgery. Surgery has not been scheduled yet. Patient has not seen general surgery yet for evaluation of buttock concern. Patient is to follow-up with the office after seeing Dr. Julius Patel general surgery to review blood sugar levels at that time prior to clearing patient for surgery    FOLLOW UP AND INSTRUCTIONS:  No follow-ups on file. · Discussed use, benefit, and side effects of prescribed medications. Barriers to medication compliance addressed. All patient questions answered. Pt voiced understanding. · Patient instructed to return to the office if symptoms do not resolve or go directly to the ER if the symptoms worsen - patient voiced understanding.     · Patient given educational materials - see patient instructions    Nelsy Ortega [facility]  8/6/2020, 3:26 PM    This note is created with the assistance of a speech-recognition program. While intending to generate a document that actually reflects the content of the visit, the document can still have some mistakes which may not have been identified and corrected by editing.

## 2020-08-27 ENCOUNTER — TELEPHONE (OUTPATIENT)
Dept: FAMILY MEDICINE CLINIC | Age: 35
End: 2020-08-27

## 2020-08-28 RX ORDER — ORAL SEMAGLUTIDE 3 MG/1
TABLET ORAL
Qty: 30 TABLET | Refills: 0 | Status: SHIPPED | OUTPATIENT
Start: 2020-08-28 | End: 2020-10-01 | Stop reason: SDUPTHER

## 2020-08-28 NOTE — TELEPHONE ENCOUNTER
Next Visit Date:  Future Appointments   Date Time Provider Junaid Andersoni   9/3/2020  2:30 PM Tata Raygoza MD [address] Maintenance   Topic Date Due    Pneumococcal 0-64 years Vaccine (1 of 1 - PPSV23) 1991    Diabetic foot exam  1995    Diabetic retinal exam  1995    Lipid screen  1995    Diabetic microalbuminuria test  2003    Hepatitis B vaccine (1 of 3 - Risk 3-dose series) 2004    HIV screen  2021 (Originally 3/18/2000)    Flu vaccine (1) 2020    A1C test (Diabetic or Prediabetic)  2021    Cervical cancer screen  2025    DTaP/Tdap/Td vaccine (2 - Td) 2030    Hepatitis A vaccine  Aged Out    Hib vaccine  Aged Out    Meningococcal (ACWY) vaccine  Aged Out    Varicella vaccine  Discontinued       Hemoglobin A1C (%)   Date Value   2020 7.0             ( goal A1C is < 7)   No results found for: LABMICR  No results found for: LDLCHOLESTEROL, LDLCALC    (goal LDL is <100)   AST (U/L)   Date Value   2020 10     ALT (U/L)   Date Value   2020 12     BUN (mg/dL)   Date Value   2020 11     BP Readings from Last 3 Encounters:   20 104/73   20 120/72   20 124/85          (goal 120/80)    All Future Testing planned in [facility]  Lab Frequency Next Occurrence   Hemoglobin A1C Once 2021   XR CHEST (2 VW) Once 2020               Patient Active Problem List:     Migraine without aura and without status migrainosus, not intractable     Anxiety     Hx laparoscopic cholecystectomy     Hx of  section     History of third molar tooth extraction     PCOS (polycystic ovarian syndrome)     Family history of diabetes mellitus (DM)     Family history of colon cancer in father     Chronic low back pain without sciatica     Type 2 diabetes mellitus without complication, without long-term current use of insulin (HCC)     Menorrhagia with irregular cycle     Insulin resistance     Endometrial thickening on ultrasound     Bulky or enlarged uterus

## 2020-09-02 ENCOUNTER — TELEPHONE (OUTPATIENT)
Dept: PAIN MANAGEMENT | Age: 35
End: 2020-09-02

## 2020-09-03 ENCOUNTER — INITIAL CONSULT (OUTPATIENT)
Dept: PAIN MANAGEMENT | Age: 35
End: 2020-09-03
Payer: COMMERCIAL

## 2020-09-03 VITALS
SYSTOLIC BLOOD PRESSURE: 132 MMHG | HEIGHT: 67 IN | DIASTOLIC BLOOD PRESSURE: 96 MMHG | WEIGHT: 293 LBS | TEMPERATURE: 99.1 F | BODY MASS INDEX: 45.99 KG/M2 | HEART RATE: 84 BPM

## 2020-09-03 PROCEDURE — 99244 OFF/OP CNSLTJ NEW/EST MOD 40: CPT | Performed by: PAIN MEDICINE

## 2020-09-03 RX ORDER — COVID-19 ANTIGEN TEST
KIT MISCELLANEOUS
COMMUNITY
End: 2020-10-08 | Stop reason: ALTCHOICE

## 2020-09-03 ASSESSMENT — ENCOUNTER SYMPTOMS
BACK PAIN: 1
BOWEL INCONTINENCE: 0
EYES NEGATIVE: 1
RESPIRATORY NEGATIVE: 1
POOR WOUND HEALING: 0
ABDOMINAL PAIN: 0

## 2020-09-03 NOTE — PROGRESS NOTES
Laterality Date     SECTION  2005     SECTION  2008    CHOLECYSTECTOMY  2000    KNEE SURGERY Left 2012    SHOULDER SURGERY Right 2017    tendon tear    WISDOM TOOTH EXTRACTION         Allergies   Allergen Reactions    Azithromycin Anaphylaxis    Bee Venom Anaphylaxis    Penicillins Anaphylaxis and Hives    Propoxyphene Anaphylaxis    Bupropion Hives    Cephalexin     Levofloxacin Hives    Paroxetine Hcl Hives    Rofecoxib     Zoloft [Sertraline Hcl]          Current Outpatient Medications:     Naproxen Sodium (ALEVE) 220 MG CAPS, Take by mouth, Disp: , Rfl:     Menthol, Topical Analgesic, (BIOFREEZE EX), Apply topically, Disp: , Rfl:     metFORMIN (GLUCOPHAGE) 500 MG tablet, Take 1 tablet by mouth 2 times daily (with meals), Disp: 60 tablet, Rfl: 5    Semaglutide (RYBELSUS) 3 MG TABS, Lot [id number] Exp , Disp: 30 tablet, Rfl: 0    cyclobenzaprine (FLEXERIL) 10 MG tablet, cyclobenzaprine 10 mg tablet  TAKE 1 TABLET BY MOUTH AT BEDTIME AS NEEDED FOR MUSCLE PAIN, Disp: , Rfl:     meloxicam (MOBIC) 15 MG tablet, meloxicam 15 mg tablet, Disp: , Rfl:     SUMAtriptan (IMITREX) 100 MG tablet, sumatriptan 100 mg tablet  TAKE 1 TABLET BY MOUTH ONCE AT ONSET, THEN REPEAT IN 1 HOUR FOR 1 DOSE IF STILL ONGOING., Disp: , Rfl:     HYDROcodone-acetaminophen (NORCO)  MG per tablet, hydrocodone 10 mg-acetaminophen 325 mg tablet  TAKE 1 TABLET BY MOUTH 4 TIMES DAILY AS NEEDED FOR PAIN, Disp: , Rfl:     Family History   Problem Relation Age of Onset    Diabetes Maternal Grandmother     Cervical Cancer Maternal Grandmother     Diabetes Mother        Social History     Socioeconomic History    Marital status:      Spouse name: Not on file    Number of children: Not on file    Years of education: Not on file    Highest education level: Not on file   Occupational History    Not on file   Social Needs    Financial resource strain: Not hard at all   [place] insecurity     Worry: Never true     Inability: Never true    Transportation needs     Medical: No     Non-medical: No   Tobacco Use    Smoking status: Never Smoker    Smokeless tobacco: Never Used   Substance and Sexual Activity    Alcohol use: Not Currently    Drug use: Never    Sexual activity: Yes     Partners: Male   Lifestyle    Physical activity     Days per week: Not on file     Minutes per session: Not on file    Stress: Not on file   Relationships    Social connections     Talks on phone: Not on file     Gets together: Not on file     Attends Adventism service: Not on file     Active member of club or organization: Not on file     Attends meetings of clubs or organizations: Not on file     Relationship status: Not on file    Intimate partner violence     Fear of current or ex partner: Not on file     Emotionally abused: Not on file     Physically abused: Not on file     Forced sexual activity: Not on file   Other Topics Concern    Not on file   Social History Narrative    Not on file       Review of Systems:  Review of Systems   Constitution: Negative for fever and weight loss. HENT: Negative. Eyes: Negative. Cardiovascular: Negative for chest pain. Respiratory: Negative. Endocrine: Negative. Hematologic/Lymphatic: Negative. Skin: Negative for poor wound healing. Musculoskeletal: Positive for arthritis, back pain, joint pain, myalgias, neck pain and stiffness. Gastrointestinal: Negative for abdominal pain and bowel incontinence. Genitourinary: Negative for bladder incontinence, dysuria and pelvic pain. Neurological: Positive for headaches, numbness, paresthesias, tingling and weakness. Psychiatric/Behavioral: Negative. Physical Exam:  BP (!) 132/96   Pulse 84   Temp 99.1 °F (37.3 °C) (Temporal)   Ht 5' 7\" (1.702 m)   Wt (!) 393 lb (178.3 kg)   BMI 61.55 kg/m²     Physical Exam  Vitals signs reviewed. Constitutional:       General: She is awake. Appearance: She is not ill-appearing or diaphoretic. HENT:      Right Ear: External ear normal.      Left Ear: External ear normal.   Eyes:      General:         Right eye: No discharge. Left eye: No discharge. Conjunctiva/sclera: Conjunctivae normal.   Neck:      Thyroid: No thyromegaly. Trachea: No tracheal deviation. Pulmonary:      Effort: Pulmonary effort is normal. No respiratory distress. Breath sounds: No stridor. Musculoskeletal:      Lumbar back: She exhibits tenderness. She exhibits no edema and no deformity. Skin:     General: Skin is warm and dry. Neurological:      Mental Status: She is alert and oriented to person, place, and time. Gait: Gait normal.   Psychiatric:         Attention and Perception: Attention and perception normal.         Behavior: Behavior normal.         Record/Diagnostics Review:    As above, I did review the imaging    Orders:  Orders Placed This Encounter   Procedures    XR KNEE RIGHT (3 VIEWS)    XR LUMBAR SPINE (2-3 VIEWS)    MRI LUMBAR SPINE [address] [place] [address] Ambulatory referral to Physical Therapy     No orders of the defined types were placed in this encounter. Assessment:  1. Lumbar radiculopathy    2. Chronic bilateral low back pain, unspecified whether sciatica present    3. Chronic pain of right knee    4. Type 2 diabetes mellitus without complication, without long-term current use of insulin ([address]    5. Morbid obesity ([address]    6. Fibromyalgia        Treatment Plan:  DISCUSSION: Treatment options discussed with patient and all questions answered to patient's satisfaction. OARRS Review: Reviewed and acceptable for medications prescribed. TREATMENT OPTIONS:     Discussed different treatment options including continued conservative care such as physical therapy, chiropractic care, acupuncture. Discussed different interventional options such as epidural steroids or medial branch blocks.   Also discussed neuromodulation in the form of spinal cord stimulation. Also discussed surgical evaluation. This point I believe imaging is appropriate. Obtain x-ray of the right knee. Obtain x-ray of her lumbar spine. Has failed conservative measures, including physical therapy and the pain is worsening, I do believe an MRI of the Lumbar spine is indicated to further evaluate pathology and guide treatment plan. Consider injection therapies versus surgical evaluation based on imaging results. Try compounding cream to the affected area. For the fibromyalgia, recommend PT, weight loss, exercise program.  Consider Cymbalta    At this point, I see no convincing evidence for chronic opioid therapy. We will try to obtain records from her previous physician in [facility]. Uriel Solorio M.D. I have reviewed the chief complaint and history of present illness (including ROS and PFSH) and vital documentation by my staff and I agree with their documentation and have added where applicable.

## 2020-10-01 RX ORDER — ORAL SEMAGLUTIDE 3 MG/1
TABLET ORAL
Qty: 30 TABLET | Refills: 0 | Status: SHIPPED | OUTPATIENT
Start: 2020-10-01 | End: 2021-05-05 | Stop reason: SDUPTHER

## 2020-10-05 ENCOUNTER — TELEPHONE (OUTPATIENT)
Dept: OBGYN CLINIC | Age: 35
End: 2020-10-05

## 2020-10-06 ASSESSMENT — ENCOUNTER SYMPTOMS: BACK PAIN: 1

## 2020-10-06 NOTE — PROGRESS NOTES
PMH: Patient complains of low back pain that started years ago. She also complains of left knee pain. She was seen on consult 9/3/20 and imaging was ordered but patient has not completed. She was seeing pain management in [facility] and states she was taking hydrocodone. She states this is the only thing that manages her pain. She does not want injections. She has never had surgery on her back. She has done PT in the past with no benefit but states she does continue to do home exercises. She has also been diagnosed with fibromyalgia. Pt states she was taking meloxicam and a muscle relaxant in the past with benefit. Back Pain   This is a chronic problem. The current episode started more than 1 year ago. The problem occurs constantly. The problem is unchanged. The pain is present in the lumbar spine. The quality of the pain is described as aching, burning, shooting and stabbing. Radiates to: down both legs to the knees. The pain is at a severity of 7/10. The pain is moderate. The symptoms are aggravated by position, standing and sitting. Associated symptoms include numbness, paresthesias and tingling. Pertinent negatives include no chest pain or fever. Patient denies any new neurological symptoms. No bowel or bladder incontinence, no weakness, and no falling. Review of OARRS does not show any aberrant prescription behavior. Medication is helping the patient stay active. Patient denies any side effects and reports adequate analgesia. No sign of misuse/abuse.     Past Medical History:   Diagnosis Date    Anxiety     Arthritis     Bipolar disorder [address] [address]     Depression     Disc disease, degenerative, lumbar or lumbosacral     Disc disease, degenerative, lumbar or lumbosacral     Fibromyalgia     GERD (gastroesophageal reflux disease)     IBS (irritable bowel syndrome)     Migraines        Past Surgical History:   Procedure Laterality Date     SECTION  2005     SECTION  04/08/2008    CHOLECYSTECTOMY  2000    KNEE SURGERY Left 09/12/2012    SHOULDER SURGERY Right 2017    tendon tear    WISDOM TOOTH EXTRACTION         Allergies   Allergen Reactions    Azithromycin Anaphylaxis    Bee Venom Anaphylaxis    Penicillins Anaphylaxis and Hives    Propoxyphene Anaphylaxis    Bupropion Hives    Cephalexin     Levofloxacin Hives    Paroxetine Hcl Hives    Rofecoxib     Zoloft [Sertraline Hcl]          Current Outpatient Medications:     metFORMIN (GLUCOPHAGE) 500 MG tablet, Take 1 tablet by mouth 2 times daily (with meals), Disp: 60 tablet, Rfl: 5    Semaglutide (RYBELSUS) 3 MG TABS, Lot [id number] Exp 2/22, Disp: 30 tablet, Rfl: 0    Naproxen Sodium (ALEVE) 220 MG CAPS, Take by mouth, Disp: , Rfl:     Menthol, Topical Analgesic, (BIOFREEZE EX), Apply topically, Disp: , Rfl:     cyclobenzaprine (FLEXERIL) 10 MG tablet, cyclobenzaprine 10 mg tablet  TAKE 1 TABLET BY MOUTH AT BEDTIME AS NEEDED FOR MUSCLE PAIN, Disp: , Rfl:     HYDROcodone-acetaminophen (NORCO)  MG per tablet, hydrocodone 10 mg-acetaminophen 325 mg tablet  TAKE 1 TABLET BY MOUTH 4 TIMES DAILY AS NEEDED FOR PAIN, Disp: , Rfl:     meloxicam (MOBIC) 15 MG tablet, meloxicam 15 mg tablet, Disp: , Rfl:     SUMAtriptan (IMITREX) 100 MG tablet, sumatriptan 100 mg tablet  TAKE 1 TABLET BY MOUTH ONCE AT ONSET, THEN REPEAT IN 1 HOUR FOR 1 DOSE IF STILL ONGOING., Disp: , Rfl:     Family History   Problem Relation Age of Onset    Diabetes Maternal Grandmother     Cervical Cancer Maternal Grandmother     Diabetes Mother        Social History     Socioeconomic History    Marital status:      Spouse name: Not on file    Number of children: Not on file    Years of education: Not on file    Highest education level: Not on file   Occupational History    Not on file   Social Needs    Financial resource strain: Not hard at all    Food insecurity     Worry: Never true     Inability: Never true   Zannie Cowden Transportation needs     Medical: No     Non-medical: No   Tobacco Use    Smoking status: Never Smoker    Smokeless tobacco: Never Used   Substance and Sexual Activity    Alcohol use: Not Currently    Drug use: Never    Sexual activity: Yes     Partners: Male   Lifestyle    Physical activity     Days per week: Not on file     Minutes per session: Not on file    Stress: Not on file   Relationships    Social connections     Talks on phone: Not on file     Gets together: Not on file     Attends Rastafarian service: Not on file     Active member of club or organization: Not on file     Attends meetings of clubs or organizations: Not on file     Relationship status: Not on file    Intimate partner violence     Fear of current or ex partner: Not on file     Emotionally abused: Not on file     Physically abused: Not on file     Forced sexual activity: Not on file   Other Topics Concern    Not on file   Social History Narrative    Not on file       Review of Systems:  Review of Systems   Constitution: Negative for chills and fever. Cardiovascular: Negative for chest pain and palpitations. Respiratory: Negative for cough and shortness of breath. Musculoskeletal: Positive for back pain. Gastrointestinal: Negative for constipation. Neurological: Positive for numbness, paresthesias and tingling. Negative for disturbances in coordination and loss of balance. Physical Exam:  There were no vitals taken for this visit. Physical Exam  HENT:      Head: Normocephalic. Neck:      Musculoskeletal: Normal range of motion. Pulmonary:      Effort: Pulmonary effort is normal.   Musculoskeletal: Normal range of motion. Skin:     General: Skin is warm and dry. Neurological:      Mental Status: She is alert and oriented to person, place, and time. Record/Diagnostics Review:    No imaging available to review at this time.     Imaging ordered last month - pt has not completed      Assessment:  No diagnosis found. Treatment Plan:  DISCUSSION: Treatment options discussed with patient and all questions answered to patient's satisfaction. TREATMENT OPTIONS:   Discussed with pt that she will need to complete imaging to guide our treatment plan  She does not want injections or surgery  She is requesting hydrocodone - did discuss that this is not part of her POC at this time  Consider rheumatology referral and aquatic therapy for fibromyalgia  Start Cymbalta 30 mg QD - titrate to 60 mg next month if appropriate  Start tizanidine 4 mg HS  Start meloxicam 15 mg QD  D/C Aleve  Follow up in 4 weeks            I have reviewed the chief complaint and history of present illness (including ROS and PFSH) and vital documentation by my staff and I agree with their documentation and have added where applicable.

## 2020-10-08 ENCOUNTER — OFFICE VISIT (OUTPATIENT)
Dept: PAIN MANAGEMENT | Age: 35
End: 2020-10-08
Payer: COMMERCIAL

## 2020-10-08 VITALS
HEART RATE: 80 BPM | BODY MASS INDEX: 45.99 KG/M2 | DIASTOLIC BLOOD PRESSURE: 82 MMHG | HEIGHT: 67 IN | SYSTOLIC BLOOD PRESSURE: 135 MMHG | WEIGHT: 293 LBS

## 2020-10-08 PROCEDURE — 99214 OFFICE O/P EST MOD 30 MIN: CPT | Performed by: NURSE PRACTITIONER

## 2020-10-08 RX ORDER — LIDOCAINE AND PRILOCAINE 25; 25 MG/G; MG/G
CREAM TOPICAL
COMMUNITY
Start: 2020-09-10 | End: 2022-09-27

## 2020-10-08 RX ORDER — MELOXICAM 15 MG/1
15 TABLET ORAL DAILY PRN
Qty: 30 TABLET | Refills: 0 | Status: SHIPPED | OUTPATIENT
Start: 2020-10-08 | End: 2020-11-02 | Stop reason: SDUPTHER

## 2020-10-08 RX ORDER — DULOXETIN HYDROCHLORIDE 30 MG/1
30 CAPSULE, DELAYED RELEASE ORAL DAILY
Qty: 30 CAPSULE | Refills: 0 | Status: SHIPPED | OUTPATIENT
Start: 2020-10-08 | End: 2020-11-02 | Stop reason: DRUGHIGH

## 2020-10-08 RX ORDER — TIZANIDINE 4 MG/1
4 TABLET ORAL NIGHTLY PRN
Qty: 30 TABLET | Refills: 0 | Status: SHIPPED | OUTPATIENT
Start: 2020-10-08 | End: 2020-11-02 | Stop reason: SDUPTHER

## 2020-10-08 ASSESSMENT — ENCOUNTER SYMPTOMS
CONSTIPATION: 0
SHORTNESS OF BREATH: 0
COUGH: 0

## 2020-10-13 ENCOUNTER — TELEPHONE (OUTPATIENT)
Dept: FAMILY MEDICINE CLINIC | Age: 35
End: 2020-10-13

## 2020-10-24 ENCOUNTER — HOSPITAL ENCOUNTER (OUTPATIENT)
Dept: GENERAL RADIOLOGY | Age: 35
Discharge: HOME | End: 2020-10-26
Payer: COMMERCIAL

## 2020-10-24 ENCOUNTER — HOSPITAL ENCOUNTER (OUTPATIENT)
Age: 35
Discharge: HOME | End: 2020-10-26
Payer: COMMERCIAL

## 2020-10-24 PROCEDURE — 72100 X-RAY EXAM L-S SPINE 2/3 VWS: CPT

## 2020-10-24 PROCEDURE — 73562 X-RAY EXAM OF KNEE 3: CPT

## 2020-10-26 NOTE — TELEPHONE ENCOUNTER
Electronic medication refill request. Pharmacy on file. Please advise.         Next Visit Date:  Future Appointments   Date Time Provider Junaid Andersoni   2020  8:35 AM CLARY Velasco - CNP 5 Caroline Guzman Maintenance   Topic Date Due    Pneumococcal 0-64 years Vaccine (1 of 1 - PPSV23) 1991    Diabetic foot exam  1995    Diabetic retinal exam  1995    Lipid screen  1995    Diabetic microalbuminuria test  2003    Hepatitis B vaccine (1 of 3 - Risk 3-dose series) 2004    Flu vaccine (1) 2020    HIV screen  2021 (Originally 3/18/2000)    A1C test (Diabetic or Prediabetic)  2021    Cervical cancer screen  2025    DTaP/Tdap/Td vaccine (2 - Td) 2030    Hepatitis A vaccine  Aged Out    Hib vaccine  Aged Out    Meningococcal (ACWY) vaccine  Aged Out    Varicella vaccine  Discontinued       Hemoglobin A1C (%)   Date Value   2020 7.0             ( goal A1C is < 7)   No results found for: LABMICR  No results found for: LDLCHOLESTEROL, LDLCALC    (goal LDL is <100)   AST (U/L)   Date Value   2020 10     ALT (U/L)   Date Value   2020 12     BUN (mg/dL)   Date Value   2020 11     BP Readings from Last 3 Encounters:   10/08/20 135/82   20 (!) 132/96   20 104/73          (goal 120/80)    All Future Testing planned in [facility]  Lab Frequency Next Occurrence   Hemoglobin A1C Once 2021   XR CHEST (2 VW) Once 2020   MRI LUMBAR SPINE WO CONTRAST Once 2020               Patient Active Problem List:     Migraine without aura and without status migrainosus, not intractable     Anxiety     Hx laparoscopic cholecystectomy     Hx of  section     History of third molar tooth extraction     PCOS (polycystic ovarian syndrome)     Family history of diabetes mellitus (DM)     Family history of colon cancer in father     Chronic low back pain without sciatica     Type 2 diabetes mellitus without complication, without long-term current use of insulin (HCC)     Menorrhagia with irregular cycle     Insulin resistance     Endometrial thickening on ultrasound     Bulky or enlarged uterus

## 2020-11-02 ENCOUNTER — OFFICE VISIT (OUTPATIENT)
Dept: PAIN MANAGEMENT | Age: 35
End: 2020-11-02
Payer: COMMERCIAL

## 2020-11-02 VITALS
DIASTOLIC BLOOD PRESSURE: 80 MMHG | TEMPERATURE: 97.7 F | BODY MASS INDEX: 45.99 KG/M2 | HEART RATE: 83 BPM | SYSTOLIC BLOOD PRESSURE: 135 MMHG | HEIGHT: 67 IN | WEIGHT: 293 LBS

## 2020-11-02 PROBLEM — G89.29 CHRONIC PAIN OF LEFT KNEE: Chronic | Status: ACTIVE | Noted: 2020-11-02

## 2020-11-02 PROBLEM — M25.562 CHRONIC PAIN OF LEFT KNEE: Chronic | Status: ACTIVE | Noted: 2020-11-02

## 2020-11-02 PROBLEM — M43.06 PARS DEFECT OF LUMBAR SPINE: Chronic | Status: ACTIVE | Noted: 2020-11-02

## 2020-11-02 PROCEDURE — 99214 OFFICE O/P EST MOD 30 MIN: CPT | Performed by: NURSE PRACTITIONER

## 2020-11-02 RX ORDER — DULOXETIN HYDROCHLORIDE 60 MG/1
60 CAPSULE, DELAYED RELEASE ORAL DAILY
Qty: 30 CAPSULE | Refills: 0 | Status: SHIPPED | OUTPATIENT
Start: 2020-11-02 | End: 2020-11-12 | Stop reason: SDUPTHER

## 2020-11-02 RX ORDER — TIZANIDINE 4 MG/1
4 TABLET ORAL NIGHTLY PRN
Qty: 30 TABLET | Refills: 0 | Status: SHIPPED | OUTPATIENT
Start: 2020-11-02 | End: 2020-12-21

## 2020-11-02 RX ORDER — MELOXICAM 15 MG/1
15 TABLET ORAL DAILY PRN
Qty: 30 TABLET | Refills: 0 | Status: SHIPPED | OUTPATIENT
Start: 2020-11-02 | End: 2020-12-21

## 2020-11-02 ASSESSMENT — ENCOUNTER SYMPTOMS
BOWEL INCONTINENCE: 0
CONSTIPATION: 0
SHORTNESS OF BREATH: 0
COUGH: 0
BACK PAIN: 1

## 2020-11-02 NOTE — PROGRESS NOTES
Patient is here today to review medication contract. Chief Complaint: back pain    [facility] Patient complains of low back pain that started years ago. She also complains of left knee pain. 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 XR left knee with early isteoarthritic changes. She was seeing pain management in [facility] and states she was taking hydrocodone. She states this is the only thing that manages her pain. She does not want injections. She has never had surgery on her back. She has done PT in the past with no benefit but states she does continue to do home exercises. She has also been diagnosed with fibromyalgia. Pt states she was taking meloxicam and a muscle relaxant in the past with benefit. She was started on cymbalta 30 mg last visit - she has not noticed much relief at this dose. Back Pain   This is a chronic problem. The current episode started more than 1 year ago. The problem occurs constantly. The problem is unchanged. The pain is present in the lumbar spine. The quality of the pain is described as aching. The pain does not radiate (in the past radiated down both legs to knees). The pain is at a severity of 10/10. The pain is severe. The symptoms are aggravated by standing and position (walking). Pertinent negatives include no bladder incontinence, bowel incontinence, chest pain, fever, numbness, tingling or weakness. Risk factors include sedentary lifestyle and obesity. She has tried analgesics, heat, ice, bed rest, muscle relaxant and NSAIDs for the symptoms. The treatment provided mild relief. Patient denies any new neurological symptoms. No bowel or bladder incontinence, no weakness, and no falling.       Past Medical History:   Diagnosis Date    Anxiety     Arthritis     Bipolar disorder ([facility] [address]     Depression     Disc disease, degenerative, lumbar or lumbosacral 2002    Disc disease, degenerative, lumbar or lumbosacral     Fibromyalgia 2012    GERD (gastroesophageal reflux disease)     IBS (irritable bowel syndrome)     Migraines        Past Surgical History:   Procedure Laterality Date     SECTION  2005     SECTION  2008    CHOLECYSTECTOMY  2000    KNEE SURGERY Left 2012    SHOULDER SURGERY Right 2017    tendon tear    WISDOM TOOTH EXTRACTION         Allergies   Allergen Reactions    Azithromycin Anaphylaxis    Bee Venom Anaphylaxis    Penicillins Anaphylaxis and Hives    Propoxyphene Anaphylaxis    Bupropion Hives    Cephalexin     Levofloxacin Hives    Paroxetine Hcl Hives    Rofecoxib     Zoloft [Sertraline Hcl]          Current Outpatient Medications:     metFORMIN (GLUCOPHAGE) 500 MG tablet, Take 1 tablet by mouth 2 times daily (with meals), Disp: 60 tablet, Rfl: 5    lidocaine-prilocaine (EMLA) 2.5-2.5 % cream, , Disp: , Rfl:     DULoxetine (CYMBALTA) 30 MG extended release capsule, Take 1 capsule by mouth daily, Disp: 30 capsule, Rfl: 0    tiZANidine (ZANAFLEX) 4 MG tablet, Take 1 tablet by mouth nightly as needed (for muscle spasms), Disp: 30 tablet, Rfl: 0    meloxicam (MOBIC) 15 MG tablet, Take 1 tablet by mouth daily as needed for Pain, Disp: 30 tablet, Rfl: 0    Semaglutide (RYBELSUS) 3 MG TABS, Lot [id number] Exp , Disp: 30 tablet, Rfl: 0    Menthol, Topical Analgesic, (BIOFREEZE EX), Apply topically, Disp: , Rfl:     cyclobenzaprine (FLEXERIL) 10 MG tablet, cyclobenzaprine 10 mg tablet  TAKE 1 TABLET BY MOUTH AT BEDTIME AS NEEDED FOR MUSCLE PAIN, Disp: , Rfl:     HYDROcodone-acetaminophen (NORCO)  MG per tablet, hydrocodone 10 mg-acetaminophen 325 mg tablet  TAKE 1 TABLET BY MOUTH 4 TIMES DAILY AS NEEDED FOR PAIN, Disp: , Rfl:     SUMAtriptan (IMITREX) 100 MG tablet, sumatriptan 100 mg tablet  TAKE 1 TABLET BY MOUTH ONCE AT ONSET, THEN REPEAT IN 1 HOUR FOR 1 DOSE IF STILL ONGOING., Disp: , Rfl:     Family History   Problem Relation Age of Onset    Diabetes Maternal Grandmother     Cervical Cancer Maternal Grandmother     Diabetes Mother        Social History     Socioeconomic History    Marital status:      Spouse name: Not on file    Number of children: Not on file    Years of education: Not on file    Highest education level: Not on file   Occupational History    Not on file   Social Needs    Financial resource strain: Not hard at all   [place] insecurity     Worry: Never true     Inability: Never true   [employer] needs     Medical: No     Non-medical: No   Tobacco Use    Smoking status: Never Smoker    Smokeless tobacco: Never Used   Substance and Sexual Activity    Alcohol use: Not Currently    Drug use: Never    Sexual activity: Yes     Partners: Male   Lifestyle    Physical activity     Days per week: Not on file     Minutes per session: Not on file    Stress: Not on file   Relationships    Social connections     Talks on phone: Not on file     Gets together: Not on file     Attends Scientologist service: Not on file     Active member of club or organization: Not on file     Attends meetings of clubs or organizations: Not on file     Relationship status: Not on file    Intimate partner violence     Fear of current or ex partner: Not on file     Emotionally abused: Not on file     Physically abused: Not on file     Forced sexual activity: Not on file   Other Topics Concern    Not on file   Social History Narrative    Not on file       Review of Systems:  Review of Systems   Constitution: Negative for chills and fever. Cardiovascular: Negative for chest pain and palpitations. Respiratory: Negative for cough and shortness of breath. Musculoskeletal: Positive for back pain and joint pain. Gastrointestinal: Negative for bowel incontinence and constipation. Genitourinary: Negative for bladder incontinence. Neurological: Negative for disturbances in coordination, loss of balance, numbness, tingling and weakness.        Physical Exam:  /80   Pulse 83   Temp 97.7 °F (36.5 °C) (Infrared)   Ht 5' 7\" (1.702 m)   Wt (!) 390 lb (176.9 kg)   BMI 61.08 kg/m²     Physical Exam  HENT:      Head: Normocephalic. Neck:      Musculoskeletal: Normal range of motion. Pulmonary:      Effort: Pulmonary effort is normal.   Musculoskeletal: Normal range of motion. Left knee: Tenderness found. Lumbar back: She exhibits tenderness and pain. Skin:     General: Skin is warm and dry. Neurological:      Mental Status: She is alert and oriented to person, place, and time. Record/Diagnostics Review:    L5-S1 pars defect, otherwise negative lumbar spine. 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)    Relevant Orders    MRI LUMBAR SPINE WO CONTRAST    XR LUMBAR SPINE FLEXION AND EXTENSION ONLY    Chronic pain of left knee (Chronic)    Relevant Medications    DULoxetine (CYMBALTA) 60 MG extended release capsule    tiZANidine (ZANAFLEX) 4 MG tablet    meloxicam (MOBIC) 15 MG tablet    Lumbar pain    Relevant Medications    tiZANidine (ZANAFLEX) 4 MG tablet    meloxicam (MOBIC) 15 MG tablet    Other Relevant Orders    MRI LUMBAR SPINE WO CONTRAST    XR LUMBAR SPINE FLEXION AND EXTENSION ONLY             Treatment Plan:  Increase Cymbalta to 60 mg QD  Continue mobic  Continue zanaflex  XR with pars defect at L5-S1 - Has failed conservative measures, including physical therapy and the pain is worsening, I do believe an MRI of the lumbar spine is indicated to further evaluate pathology and guide treatment plan. Consider injection therapies versus surgical evaluation based on imaging results.   XR lumbar flexion/extension  Consider aquatic therapy - pt not interested at this time  Follow up 2 weeks

## 2020-11-12 RX ORDER — DULOXETIN HYDROCHLORIDE 60 MG/1
60 CAPSULE, DELAYED RELEASE ORAL DAILY
Qty: 30 CAPSULE | Refills: 0 | Status: SHIPPED | OUTPATIENT
Start: 2020-11-12 | End: 2021-03-01 | Stop reason: ALTCHOICE

## 2020-12-21 RX ORDER — TIZANIDINE 4 MG/1
TABLET ORAL
Qty: 30 TABLET | Refills: 0 | Status: SHIPPED | OUTPATIENT
Start: 2020-12-21 | End: 2021-03-17

## 2020-12-21 RX ORDER — MELOXICAM 15 MG/1
TABLET ORAL
Qty: 30 TABLET | Refills: 0 | Status: SHIPPED | OUTPATIENT
Start: 2020-12-21 | End: 2021-03-01 | Stop reason: SDUPTHER

## 2021-01-14 ENCOUNTER — TELEPHONE (OUTPATIENT)
Dept: OBGYN CLINIC | Age: 36
End: 2021-01-14

## 2021-03-01 ENCOUNTER — TELEMEDICINE (OUTPATIENT)
Dept: PAIN MANAGEMENT | Age: 36
End: 2021-03-01
Payer: COMMERCIAL

## 2021-03-01 ENCOUNTER — TELEPHONE (OUTPATIENT)
Dept: FAMILY MEDICINE CLINIC | Age: 36
End: 2021-03-01

## 2021-03-01 DIAGNOSIS — G89.29 CHRONIC PAIN OF LEFT KNEE: Chronic | ICD-10-CM

## 2021-03-01 DIAGNOSIS — M25.562 CHRONIC PAIN OF LEFT KNEE: Chronic | ICD-10-CM

## 2021-03-01 DIAGNOSIS — M43.06 PARS DEFECT OF LUMBAR SPINE: Chronic | ICD-10-CM

## 2021-03-01 DIAGNOSIS — G89.29 CHRONIC LOW BACK PAIN WITHOUT SCIATICA, UNSPECIFIED BACK PAIN LATERALITY: Primary | ICD-10-CM

## 2021-03-01 DIAGNOSIS — M54.50 LUMBAR PAIN: ICD-10-CM

## 2021-03-01 DIAGNOSIS — M54.50 CHRONIC LOW BACK PAIN WITHOUT SCIATICA, UNSPECIFIED BACK PAIN LATERALITY: Primary | ICD-10-CM

## 2021-03-01 DIAGNOSIS — M43.06 PARS DEFECT OF LUMBAR SPINE: Primary | Chronic | ICD-10-CM

## 2021-03-01 PROCEDURE — 99213 OFFICE O/P EST LOW 20 MIN: CPT | Performed by: NURSE PRACTITIONER

## 2021-03-01 RX ORDER — CYCLOBENZAPRINE HCL 10 MG
TABLET ORAL
Qty: 30 TABLET | Refills: 1 | Status: SHIPPED | OUTPATIENT
Start: 2021-03-01 | End: 2021-08-12 | Stop reason: SDUPTHER

## 2021-03-01 RX ORDER — MELOXICAM 15 MG/1
TABLET ORAL
Qty: 30 TABLET | Refills: 1 | Status: SHIPPED | OUTPATIENT
Start: 2021-03-01 | End: 2022-09-27 | Stop reason: ALTCHOICE

## 2021-03-01 ASSESSMENT — ENCOUNTER SYMPTOMS
BACK PAIN: 1
SHORTNESS OF BREATH: 0
CONSTIPATION: 0
COUGH: 0

## 2021-03-01 NOTE — TELEPHONE ENCOUNTER
Patient called to request a handicap placard from our office. Patient's expires in September, but it is through [facility]. Patient was instructed she needs a new one for [place] as soon as possible. Please advise, thank you!             Next Visit Date:  Future Appointments   Date Time Provider Junaid Ny   3/1/2021  2:40 PM CLARY Pryor - CNP [facility]   3/17/2021  9:45 AM Vandana Wood MD [facility] [address] Maintenance   Topic Date Due    Hepatitis C screen  1985    Pneumococcal 0-64 years Vaccine (1 of 1 - PPSV23) 03/18/1991    Diabetic foot exam  03/18/1995    Diabetic retinal exam  03/18/1995    Lipid screen  03/18/1995    Diabetic microalbuminuria test  03/18/2003    Hepatitis B vaccine (1 of 3 - Risk 3-dose series) 03/18/2004    Flu vaccine (1) 09/01/2020    HIV screen  07/07/2021 (Originally 3/18/2000)    A1C test (Diabetic or Prediabetic)  07/07/2021    Cervical cancer screen  02/24/2025    DTaP/Tdap/Td vaccine (2 - Td) 08/05/2030    Hepatitis A vaccine  Aged Out    Hib vaccine  Aged Out    Meningococcal (ACWY) vaccine  Aged Out    Varicella vaccine  Discontinued       Hemoglobin A1C (%)   Date Value   07/07/2020 7.0             ( goal A1C is < 7)   No results found for: LABMICR  No results found for: LDLCHOLESTEROL, LDLCALC    (goal LDL is <100)   AST (U/L)   Date Value   06/09/2020 10     ALT (U/L)   Date Value   06/09/2020 12     BUN (mg/dL)   Date Value   06/09/2020 11     BP Readings from Last 3 Encounters:   11/02/20 135/80   10/08/20 135/82   09/03/20 (!) 132/96          (goal 120/80)    All Future Testing planned in [facility]  Lab Frequency Next Occurrence   Hemoglobin A1C Once 06/11/2021   MRI LUMBAR SPINE WO CONTRAST Once 11/02/2020   XR LUMBAR SPINE FLEXION AND EXTENSION ONLY Once 11/02/2020               Patient Active Problem List:     Migraine without aura and without status migrainosus, not intractable     Anxiety     Hx laparoscopic cholecystectomy     Hx of  section     History of third molar tooth extraction     PCOS (polycystic ovarian syndrome)     Family history of diabetes mellitus (DM)     Family history of colon cancer in father     Lumbar pain     Type 2 diabetes mellitus without complication, without long-term current use of insulin (HCC)     Menorrhagia with irregular cycle     Insulin resistance     Endometrial thickening on ultrasound     Bulky or enlarged uterus     Pars defect of lumbar spine     Chronic pain of left knee

## 2021-03-01 NOTE — PROGRESS NOTES
Patient is here today to review medication contract. Chief Complaint: back pain    [place]  Patient complains of low back pain that started years ago. She also complains of left knee pain. 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 XR left knee with early isteoarthritic changes. She was seeing pain management in [facility] and states she was taking hydrocodone. She states this is the only thing that manages her pain. She does not want injections. She has never had surgery on her back. She has done PT in the past with no benefit but states she does continue to do home exercises. She has also been diagnosed with fibromyalgia. Pt states she was taking meloxicam and a muscle relaxant in the past with benefit. She was taking Cymbalta 60 mg QD but states her pharmacist told her to d/c due to \"it causes issues with her gall bladder\" - she is s/p cholecystectomy. She will talk to the pharmacist again to clarify. Lumbar MRI and XR were ordered at last visit in November and patient did not complete. Again discussed her options for treatment such as injections and PT. She states PT makes her pain worse and she is not interested in injections. She is requesting pain medication. Discussed with her that this is not part of her POC at this time. HPI: Back Pain  The current episode started more than 1 year ago. The problem occurs constantly. The problem is unchanged. The pain is present in the lumbar spine. The quality of the pain is described as cramping. The pain does not radiate. The pain is at a severity of 8/10. The pain is moderate. The pain is worse during the night. The symptoms are aggravated by standing, position, lying down, bending, sitting, twisting and coughing. Pertinent negatives include no chest pain or fever. She has tried muscle relaxant, heat, ice and home exercises for the symptoms. The treatment provided mild relief. Patient denies any new neurological symptoms.  No bowel or bladder incontinence, no weakness, and no falling.       Past Medical History:   Diagnosis Date    Anxiety     Arthritis     Bipolar disorder ([facility] [address]     Depression     Disc disease, degenerative, lumbar or lumbosacral     Disc disease, degenerative, lumbar or lumbosacral     Fibromyalgia     GERD (gastroesophageal reflux disease)     IBS (irritable bowel syndrome)     Migraines        Past Surgical History:   Procedure Laterality Date     SECTION  2005     SECTION  2008    CHOLECYSTECTOMY  2000    KNEE SURGERY Left 2012    SHOULDER SURGERY Right 2017    tendon tear    WISDOM TOOTH EXTRACTION         Allergies   Allergen Reactions    Azithromycin Anaphylaxis    Bee Venom Anaphylaxis    Penicillins Anaphylaxis and Hives    Propoxyphene Anaphylaxis    Bupropion Hives    Cephalexin     Levofloxacin Hives    Paroxetine Hcl Hives    Rofecoxib     Zoloft [Sertraline Hcl]          Current Outpatient Medications:     Handicap Placard MISC, by Does not apply route Expires: 3/1/2026, Disp: 1 each, Rfl: 0    meloxicam (MOBIC) 15 MG tablet, TAKE 1 TABLET BY MOUTH ONCE DAILY AS NEEDED FOR PAIN, Disp: 30 tablet, Rfl: 0    tiZANidine (ZANAFLEX) 4 MG tablet, TAKE 1 TABLET BY MOUTH NIGHTLY AS NEEDED FOR MUSCLE SPASM, Disp: 30 tablet, Rfl: 0    DULoxetine (CYMBALTA) 60 MG extended release capsule, Take 1 capsule by mouth daily, Disp: 30 capsule, Rfl: 0    metFORMIN (GLUCOPHAGE) 500 MG tablet, Take 1 tablet by mouth 2 times daily (with meals), Disp: 60 tablet, Rfl: 5    lidocaine-prilocaine (EMLA) 2.5-2.5 % cream, , Disp: , Rfl:     Semaglutide (RYBELSUS) 3 MG TABS, Lot [id number] Exp , Disp: 30 tablet, Rfl: 0    Menthol, Topical Analgesic, (BIOFREEZE EX), Apply topically, Disp: , Rfl:     cyclobenzaprine (FLEXERIL) 10 MG tablet, cyclobenzaprine 10 mg tablet  TAKE 1 TABLET BY MOUTH AT BEDTIME AS NEEDED FOR MUSCLE PAIN, Disp: , Rfl:     HYDROcodone-acetaminophen (NORCO)  MG per Respiratory: Negative for cough and shortness of breath. Musculoskeletal: Positive for back pain. Gastrointestinal: Negative for constipation. Neurological: Negative for disturbances in coordination and loss of balance. Physical Exam:  There were no vitals taken for this visit. Physical Exam  HENT:      Head: Normocephalic. Pulmonary:      Effort: Pulmonary effort is normal.   Neurological:      Mental Status: She is alert. Psychiatric:         Mood and Affect: Mood normal.         Behavior: Behavior normal.         Record/Diagnostics Review:    Lumbar XR 2020  L5-S1 pars defect, otherwise negative lumbar spine. 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)    Lumbar pain    Relevant Medications    cyclobenzaprine (FLEXERIL) 10 MG tablet    meloxicam (MOBIC) 15 MG tablet             Treatment Plan:  Discussed different treatment options including continued conservative care such as physical therapy, chiropractic care, acupuncture. Discussed different interventional options such as epidural steroids or medial branch blocks. Lumbar MRI and XR were ordered at last visit in November and patient did not complete. Again discussed her options for treatment such as injections and PT. She states PT makes her pain worse and she is not interested in injections. She is requesting pain medication. Discussed with her that this is not part of her POC at this time. Follow up after imaging completed    Tino Knapp, was evaluated through a synchronous (real-time) audio-video encounter. The patient (or guardian if applicable) is aware that this is a billable service. Verbal consent to proceed has been obtained within the past 12 months.  The visit was conducted pursuant to the emergency declaration under the [facility], [facility]5 waiver authority and the [employer] Appropriations Act. Patient identification was verified, and a caregiver was present when appropriate. The patient was located in a state where the provider was credentialed to provide care. Total time spent for this encounter: 15 minutes    --CLARY Díaz CNP on 3/1/2021 at 3:05 PM    An electronic signature was used to authenticate this note.

## 2021-03-17 ENCOUNTER — OFFICE VISIT (OUTPATIENT)
Dept: FAMILY MEDICINE CLINIC | Age: 36
End: 2021-03-17
Payer: COMMERCIAL

## 2021-03-17 VITALS
WEIGHT: 293 LBS | SYSTOLIC BLOOD PRESSURE: 125 MMHG | BODY MASS INDEX: 45.99 KG/M2 | TEMPERATURE: 97 F | DIASTOLIC BLOOD PRESSURE: 84 MMHG | HEIGHT: 67 IN | HEART RATE: 78 BPM

## 2021-03-17 DIAGNOSIS — E66.01 CLASS 3 SEVERE OBESITY DUE TO EXCESS CALORIES WITH SERIOUS COMORBIDITY AND BODY MASS INDEX (BMI) OF 60.0 TO 69.9 IN ADULT (HCC): ICD-10-CM

## 2021-03-17 DIAGNOSIS — K21.9 GASTROESOPHAGEAL REFLUX DISEASE WITHOUT ESOPHAGITIS: ICD-10-CM

## 2021-03-17 DIAGNOSIS — Z11.59 NEED FOR HEPATITIS C SCREENING TEST: ICD-10-CM

## 2021-03-17 DIAGNOSIS — T63.444D: ICD-10-CM

## 2021-03-17 DIAGNOSIS — E11.9 TYPE 2 DIABETES MELLITUS WITHOUT COMPLICATION, WITHOUT LONG-TERM CURRENT USE OF INSULIN (HCC): Primary | ICD-10-CM

## 2021-03-17 DIAGNOSIS — G43.009 MIGRAINE WITHOUT AURA AND WITHOUT STATUS MIGRAINOSUS, NOT INTRACTABLE: ICD-10-CM

## 2021-03-17 LAB — HBA1C MFR BLD: 7 %

## 2021-03-17 PROCEDURE — 99214 OFFICE O/P EST MOD 30 MIN: CPT | Performed by: FAMILY MEDICINE

## 2021-03-17 PROCEDURE — G8427 DOCREV CUR MEDS BY ELIG CLIN: HCPCS | Performed by: FAMILY MEDICINE

## 2021-03-17 PROCEDURE — 83036 HEMOGLOBIN GLYCOSYLATED A1C: CPT | Performed by: FAMILY MEDICINE

## 2021-03-17 PROCEDURE — G8417 CALC BMI ABV UP PARAM F/U: HCPCS | Performed by: FAMILY MEDICINE

## 2021-03-17 PROCEDURE — G8484 FLU IMMUNIZE NO ADMIN: HCPCS | Performed by: FAMILY MEDICINE

## 2021-03-17 PROCEDURE — 3051F HG A1C>EQUAL 7.0%<8.0%: CPT | Performed by: FAMILY MEDICINE

## 2021-03-17 PROCEDURE — 1036F TOBACCO NON-USER: CPT | Performed by: FAMILY MEDICINE

## 2021-03-17 PROCEDURE — 2022F DILAT RTA XM EVC RTNOPTHY: CPT | Performed by: FAMILY MEDICINE

## 2021-03-17 RX ORDER — OMEPRAZOLE 20 MG/1
20 CAPSULE, DELAYED RELEASE ORAL
Qty: 30 CAPSULE | Refills: 0 | Status: SHIPPED | OUTPATIENT
Start: 2021-03-17 | End: 2021-04-12

## 2021-03-17 RX ORDER — EPINEPHRINE 0.3 MG/.3ML
0.3 INJECTION SUBCUTANEOUS ONCE
Qty: 2 EACH | Refills: 0 | Status: SHIPPED | OUTPATIENT
Start: 2021-03-17 | End: 2021-08-05 | Stop reason: SDUPTHER

## 2021-03-17 ASSESSMENT — PATIENT HEALTH QUESTIONNAIRE - PHQ9
1. LITTLE INTEREST OR PLEASURE IN DOING THINGS: 0
2. FEELING DOWN, DEPRESSED OR HOPELESS: 0

## 2021-03-17 ASSESSMENT — ENCOUNTER SYMPTOMS
RESPIRATORY NEGATIVE: 1
GASTROINTESTINAL NEGATIVE: 1
EYES NEGATIVE: 1

## 2021-03-17 NOTE — PROGRESS NOTES
[address] CARE  [address] [place] [address]  Dept: [phone]  Dept Fax: [phone]    Chelsey Mccrary is a 39 y.o. female who is a Established patient, who presents today for her medical conditions/complaints as noted below:  Chief Complaint   Patient presents with    Diabetes         HPI:     The patient is a 59-year-old female with a past medical history significant for PCOS, type 2 diabetes, migraines, obesity, chronic knee pain who presents to [facility] care. She has been taking her medications and checking her blood sugar. She has not been exercising regularly. She does have a history of acid reflux for which she is on omeprazole. She states the medication does help control her symptoms. The patient does have a history of migraines and states her symptoms have been worsening over the last few weeks. She is getting headaches between 2-4 times per week. They feel the same as they have in the past and are typically around the frontal area. Diabetes  She presents for her follow-up diabetic visit. She has type 2 diabetes mellitus. The initial diagnosis of diabetes was made 1 year ago. Her disease course has been stable. Hypoglycemia symptoms include headaches. There are no diabetic associated symptoms. There are no hypoglycemic complications. Symptoms are stable. There are no diabetic complications. Risk factors for coronary artery disease include diabetes mellitus, obesity and sedentary lifestyle. Current diabetic treatment includes oral agent (dual therapy). She is compliant with treatment all of the time. Her weight is stable. She is following a generally healthy diet. When asked about meal planning, she reported none. She rarely participates in exercise. Her breakfast blood glucose range is generally 110-130 mg/dl. An ACE inhibitor/angiotensin II receptor blocker is being taken.        Hemoglobin A1C (%)   Date Value   03/17/2021 7.0 2020 7.0             ( goal A1Cis < 7)   No results found for: LABMICR  No results found for: LDLCHOLESTEROL, LDLCALC    (goal LDL is <100)   AST (U/L)   Date Value   2020 10     ALT (U/L)   Date Value   2020 12     BUN (mg/dL)   Date Value   2020 11     BP Readings from Last 3 Encounters:   21 125/84   20 135/80   10/08/20 135/82          (goal 120/80)    Past Medical History:   Diagnosis Date    Anxiety     Arthritis     Bipolar disorder (HCC)     Depression     Disc disease, degenerative, lumbar or lumbosacral 2002    Disc disease, degenerative, lumbar or lumbosacral     Fibromyalgia     GERD (gastroesophageal reflux disease)     IBS (irritable bowel syndrome)     Migraines       Past Surgical History:   Procedure Laterality Date     SECTION  2005     SECTION  2008    CHOLECYSTECTOMY  2000    KNEE SURGERY Left 2012    SHOULDER SURGERY Right 2017    tendon tear    WISDOM TOOTH EXTRACTION         Family History   Problem Relation Age of Onset    Diabetes Maternal Grandmother     Cervical Cancer Maternal Grandmother     Cancer Maternal Grandmother         melanoma    Diabetes Mother     Depression Mother     No Known Problems Father     Diabetes Brother     Cancer Maternal Grandfather     No Known Problems Paternal Grandmother     No Known Problems Paternal Grandfather     Breast Cancer Neg Hx     Colon Cancer Neg Hx     Uterine Cancer Neg Hx     Stroke Neg Hx     Heart Attack Neg Hx        Social History     Tobacco Use    Smoking status: Never Smoker    Smokeless tobacco: Never Used   Substance Use Topics    Alcohol use: Not Currently      Current Outpatient Medications   Medication Sig Dispense Refill    Naproxen Sodium (ALEVE PO) Take by mouth      EPINEPHrine (EPIPEN 2-NARCISO) 0.3 MG/0.3ML SOAJ injection Inject 0.3 mLs into the muscle once for 1 dose Use as directed for allergic reaction 2 each 0    verapamil (CALAN SR) 120 MG extended release tablet Take 1 tablet by mouth daily 30 tablet 0    omeprazole (PRILOSEC) 20 MG delayed release capsule Take 1 capsule by mouth every morning (before breakfast) 30 capsule 0    cyclobenzaprine (FLEXERIL) 10 MG tablet cyclobenzaprine 10 mg tablet  TAKE 1 TABLET BY MOUTH AT BEDTIME AS NEEDED FOR MUSCLE PAIN 30 tablet 1    meloxicam (MOBIC) 15 MG tablet TAKE 1 TABLET BY MOUTH ONCE DAILY AS NEEDED FOR PAIN 30 tablet 1    metFORMIN (GLUCOPHAGE) 500 MG tablet Take 1 tablet by mouth 2 times daily (with meals) 60 tablet 5    lidocaine-prilocaine (EMLA) 2.5-2.5 % cream       SUMAtriptan (IMITREX) 100 MG tablet sumatriptan 100 mg tablet   TAKE 1 TABLET BY MOUTH ONCE AT ONSET, THEN REPEAT IN 1 HOUR FOR 1 DOSE IF STILL ONGOING.  Semaglutide (RYBELSUS) 3 MG TABS Lot [id number] Exp 2/22 (Patient not taking: Reported on 3/17/2021) 30 tablet 0    Menthol, Topical Analgesic, (BIOFREEZE EX) Apply topically       No current facility-administered medications for this visit.       Allergies   Allergen Reactions    Azithromycin Anaphylaxis    Bee Venom Anaphylaxis    Penicillins Anaphylaxis and Hives    Propoxyphene Anaphylaxis    Bupropion Hives    Cephalexin     Levofloxacin Hives    Motrin [Ibuprofen] Nausea Only    Paroxetine Hcl Hives    Rofecoxib     Zoloft [Sertraline Hcl]        Health Maintenance   Topic Date Due    Hepatitis C screen  Never done    Pneumococcal 0-64 years Vaccine (1 of 1 - PPSV23) Never done    Diabetic foot exam  Never done    Diabetic retinal exam  Never done    Lipid screen  Never done    COVID-19 Vaccine (1) Never done    Diabetic microalbuminuria test  Never done    Hepatitis B vaccine (1 of 3 - Risk 3-dose series) Never done    Flu vaccine (1) Never done    HIV screen  07/07/2021 (Originally 3/18/2000)    A1C test (Diabetic or Prediabetic)  03/17/2022    Cervical cancer screen  02/24/2025    DTaP/Tdap/Td vaccine (2 - Td) 08/05/2030  Hepatitis A vaccine  Aged Out    Hib vaccine  Aged Out    Meningococcal (ACWY) vaccine  Aged Out    Varicella vaccine  Discontinued       Subjective:     Review of Systems   Constitutional: Negative. HENT: Negative. Eyes: Negative. Respiratory: Negative. Cardiovascular: Negative. Gastrointestinal: Negative. Heartburn     Genitourinary: Negative. Musculoskeletal: Negative. Skin: Negative. Allergic/Immunologic: Negative for environmental allergies, food allergies and immunocompromised state. Neurological: Positive for headaches. Psychiatric/Behavioral: Negative. Objective:     Physical Exam  Vitals signs and nursing note reviewed. Constitutional:       General: She is awake. She is not in acute distress. Appearance: Normal appearance. She is obese. She is not ill-appearing, toxic-appearing or diaphoretic. HENT:      Head: Normocephalic and atraumatic. Right Ear: Tympanic membrane, ear canal and external ear normal.      Left Ear: Tympanic membrane, ear canal and external ear normal.      Nose: Nose normal.      Mouth/Throat:      Mouth: Mucous membranes are moist.   Eyes:      Extraocular Movements: Extraocular movements intact. Conjunctiva/sclera: Conjunctivae normal.      Pupils: Pupils are equal, round, and reactive to light. Neck:      Musculoskeletal: Normal range of motion and neck supple. Cardiovascular:      Rate and Rhythm: Normal rate and regular rhythm. Pulses: Normal pulses. Heart sounds: Normal heart sounds. No murmur. No friction rub. No gallop. Pulmonary:      Effort: Pulmonary effort is normal. No respiratory distress. Breath sounds: Normal breath sounds. No stridor. No wheezing, rhonchi or rales. Musculoskeletal: Normal range of motion. Skin:     General: Skin is warm and dry. Capillary Refill: Capillary refill takes less than 2 seconds. Neurological:      General: No focal deficit present.       Mental Status: She is alert and oriented to person, place, and time. Mental status is at baseline. Psychiatric:         Attention and Perception: Attention normal.         Mood and Affect: Mood and affect normal.         Speech: Speech normal.         Behavior: Behavior normal.         Thought Content: Thought content normal.         Judgment: Judgment normal.       /84 (Site: Right Wrist, Position: Sitting, Cuff Size: Child)   Pulse 78   Temp 97 °F (36.1 °C) (Temporal)   Ht 5' 7\" (1.702 m)   Wt (!) 395 lb (179.2 kg)   BMI 61.87 kg/m²     Assessment:       Diagnosis Orders   1. Type 2 diabetes mellitus without complication, without long-term current use of insulin (HCC)     2. Class 3 severe obesity due to excess calories with serious comorbidity and body mass index (BMI) of 60.0 to 69.9 in adult ([facility] [address]     3. Toxic effect of venom of bees, undetermined intent, subsequent encounter  EPINEPHrine (EPIPEN 2-NARCISO) 0.3 MG/0.3ML SOAJ injection   4. Migraine without aura and without status migrainosus, not intractable  verapamil (CALAN SR) 120 MG extended release tablet   5. Gastroesophageal reflux disease without esophagitis  omeprazole (PRILOSEC) 20 MG delayed release capsule   6. Need for hepatitis C screening test  Hepatitis C Antibody             Plan:    Diabetespatient's A1c is at 7. Continue current medications and encourage patient to adjust her diet and increase her exercise. Obesitydiscussed with patient the importance of diet and exercise. Also discussed with her the importance of changing diet to avoid any worsening diabetes or other comorbidities. History of toxic effect of venom from beespatient given a refill for her the pen. Migrainespatient strongly encouraged to keep a migraine diary. We will start verapamil with the hopes that it will improve her migraines. Patient is to return in 6 weeks for recheck. Encouraged her to let us know she figures out any triggers as well. GERDcontinue medications consider referral to GI if symptoms are worsening. Return in about 6 weeks (around 4/28/2021) for diabetes, migraines, heartburn. Orders Placed This Encounter   Procedures    Hepatitis C Antibody     Standing Status:   Future     Standing Expiration Date:   3/17/2022    POCT glycosylated hemoglobin (Hb A1C)     Orders Placed This Encounter   Medications    EPINEPHrine (EPIPEN 2-NARCISO) 0.3 MG/0.3ML SOAJ injection     Sig: Inject 0.3 mLs into the muscle once for 1 dose Use as directed for allergic reaction     Dispense:  2 each     Refill:  0    verapamil (CALAN SR) 120 MG extended release tablet     Sig: Take 1 tablet by mouth daily     Dispense:  30 tablet     Refill:  0    omeprazole (PRILOSEC) 20 MG delayed release capsule     Sig: Take 1 capsule by mouth every morning (before breakfast)     Dispense:  30 capsule     Refill:  0       Patient given educational materials - see patient instructions. Discussed use, benefit, and side effects of prescribed medications. All patientquestions answered. Pt voiced understanding. Reviewed health maintenance. Instructedto continue current medications, diet and exercise. Patient agreed with treatmentplan. Follow up as directed.      Electronically signed by Linda Teran MD on 3/21/2021 at 2:08 PM

## 2021-03-21 PROBLEM — F31.9 BIPOLAR DISORDER (HCC): Status: ACTIVE | Noted: 2020-09-04

## 2021-03-21 PROBLEM — G43.909 MIGRAINE: Status: ACTIVE | Noted: 2020-09-04

## 2021-03-21 PROBLEM — F32.A DEPRESSIVE DISORDER: Status: ACTIVE | Noted: 2021-03-21

## 2021-04-12 DIAGNOSIS — K21.9 GASTROESOPHAGEAL REFLUX DISEASE WITHOUT ESOPHAGITIS: ICD-10-CM

## 2021-04-12 RX ORDER — OMEPRAZOLE 20 MG/1
CAPSULE, DELAYED RELEASE ORAL
Qty: 30 CAPSULE | Refills: 0 | Status: SHIPPED | OUTPATIENT
Start: 2021-04-12 | End: 2021-05-05 | Stop reason: SDUPTHER

## 2021-04-12 NOTE — TELEPHONE ENCOUNTER
Next Visit Date:  Future Appointments   Date Time Provider Department Center   2021  9:30 AM [facility] [address] Maintenance   Topic Date Due    Hepatitis C screen  Never done    Pneumococcal 0-64 years Vaccine (1 of 1 - PPSV23) Never done    Diabetic foot exam  Never done    Diabetic retinal exam  Never done    Lipid screen  Never done    COVID-19 Vaccine (1) Never done    Diabetic microalbuminuria test  Never done    Hepatitis B vaccine (1 of 3 - Risk 3-dose series) Never done    HIV screen  2021 (Originally 3/18/2000)    Flu vaccine (Season Ended) 2021    A1C test (Diabetic or Prediabetic)  2022    Cervical cancer screen  2025    DTaP/Tdap/Td vaccine (2 - Td) 2030    Hepatitis A vaccine  Aged Out    Hib vaccine  Aged Out    Meningococcal (ACWY) vaccine  Aged Out    Varicella vaccine  Discontinued       Hemoglobin A1C (%)   Date Value   2021 7.0   2020 7.0             ( goal A1C is < 7)   No results found for: LABMICR  No results found for: LDLCHOLESTEROL, LDLCALC    (goal LDL is <100)   AST (U/L)   Date Value   2020 10     ALT (U/L)   Date Value   2020 12     BUN (mg/dL)   Date Value   2020 11     BP Readings from Last 3 Encounters:   21 125/84   20 135/80   10/08/20 135/82          (goal 120/80)    All Future Testing planned in [facility]  Lab Frequency Next Occurrence   Hemoglobin A1C Once 2021   MRI LUMBAR SPINE WO CONTRAST Once 2020   XR LUMBAR SPINE FLEXION AND EXTENSION ONLY Once 2020   Hepatitis C Antibody Once 2021               Patient Active Problem List:     Migraine without aura and without status migrainosus, not intractable     Anxiety     Hx laparoscopic cholecystectomy     Hx of  section     History of third molar tooth extraction     PCOS (polycystic ovarian syndrome)     Family history of diabetes mellitus (DM)     Family history of colon

## 2021-05-05 ENCOUNTER — OFFICE VISIT (OUTPATIENT)
Dept: FAMILY MEDICINE CLINIC | Age: 36
End: 2021-05-05
Payer: COMMERCIAL

## 2021-05-05 ENCOUNTER — TELEPHONE (OUTPATIENT)
Dept: FAMILY MEDICINE CLINIC | Age: 36
End: 2021-05-05

## 2021-05-05 VITALS
SYSTOLIC BLOOD PRESSURE: 120 MMHG | WEIGHT: 293 LBS | TEMPERATURE: 97.3 F | BODY MASS INDEX: 45.99 KG/M2 | HEIGHT: 67 IN | DIASTOLIC BLOOD PRESSURE: 80 MMHG

## 2021-05-05 DIAGNOSIS — M54.50 LUMBAR PAIN: ICD-10-CM

## 2021-05-05 DIAGNOSIS — Z23 NEED FOR 23-POLYVALENT PNEUMOCOCCAL POLYSACCHARIDE VACCINE: ICD-10-CM

## 2021-05-05 DIAGNOSIS — G43.009 MIGRAINE WITHOUT AURA AND WITHOUT STATUS MIGRAINOSUS, NOT INTRACTABLE: ICD-10-CM

## 2021-05-05 DIAGNOSIS — E28.2 PCOS (POLYCYSTIC OVARIAN SYNDROME): ICD-10-CM

## 2021-05-05 DIAGNOSIS — E11.9 TYPE 2 DIABETES MELLITUS WITHOUT COMPLICATION, WITHOUT LONG-TERM CURRENT USE OF INSULIN (HCC): Primary | ICD-10-CM

## 2021-05-05 DIAGNOSIS — E11.9 TYPE 2 DIABETES MELLITUS WITHOUT COMPLICATION, WITHOUT LONG-TERM CURRENT USE OF INSULIN (HCC): ICD-10-CM

## 2021-05-05 DIAGNOSIS — Z11.59 NEED FOR HEPATITIS C SCREENING TEST: ICD-10-CM

## 2021-05-05 DIAGNOSIS — F31.77 BIPOLAR DISORDER, IN PARTIAL REMISSION, MOST RECENT EPISODE MIXED (HCC): ICD-10-CM

## 2021-05-05 DIAGNOSIS — K21.9 GASTROESOPHAGEAL REFLUX DISEASE WITHOUT ESOPHAGITIS: Primary | ICD-10-CM

## 2021-05-05 PROBLEM — E88.81 INSULIN RESISTANCE: Status: RESOLVED | Noted: 2020-07-21 | Resolved: 2021-05-05

## 2021-05-05 PROBLEM — E88.819 INSULIN RESISTANCE: Status: RESOLVED | Noted: 2020-07-21 | Resolved: 2021-05-05

## 2021-05-05 PROCEDURE — 90732 PPSV23 VACC 2 YRS+ SUBQ/IM: CPT | Performed by: FAMILY MEDICINE

## 2021-05-05 PROCEDURE — 2022F DILAT RTA XM EVC RTNOPTHY: CPT | Performed by: FAMILY MEDICINE

## 2021-05-05 PROCEDURE — G8417 CALC BMI ABV UP PARAM F/U: HCPCS | Performed by: FAMILY MEDICINE

## 2021-05-05 PROCEDURE — 1036F TOBACCO NON-USER: CPT | Performed by: FAMILY MEDICINE

## 2021-05-05 PROCEDURE — 99214 OFFICE O/P EST MOD 30 MIN: CPT | Performed by: FAMILY MEDICINE

## 2021-05-05 PROCEDURE — 90471 IMMUNIZATION ADMIN: CPT | Performed by: FAMILY MEDICINE

## 2021-05-05 PROCEDURE — 3051F HG A1C>EQUAL 7.0%<8.0%: CPT | Performed by: FAMILY MEDICINE

## 2021-05-05 PROCEDURE — G8427 DOCREV CUR MEDS BY ELIG CLIN: HCPCS | Performed by: FAMILY MEDICINE

## 2021-05-05 RX ORDER — GLUCOSAMINE HCL/CHONDROITIN SU 500-400 MG
CAPSULE ORAL
Qty: 300 STRIP | Refills: 0 | Status: SHIPPED | OUTPATIENT
Start: 2021-05-05 | End: 2021-08-05

## 2021-05-05 RX ORDER — OMEPRAZOLE 20 MG/1
CAPSULE, DELAYED RELEASE ORAL
Qty: 90 CAPSULE | Refills: 1 | Status: SHIPPED | OUTPATIENT
Start: 2021-05-05 | End: 2022-09-27

## 2021-05-05 RX ORDER — LANCETS 30 GAUGE
1 EACH MISCELLANEOUS 2 TIMES DAILY
Qty: 300 EACH | Refills: 1 | Status: SHIPPED | OUTPATIENT
Start: 2021-05-05 | End: 2021-08-05

## 2021-05-05 RX ORDER — ORAL SEMAGLUTIDE 3 MG/1
TABLET ORAL
Qty: 30 TABLET | Refills: 0 | Status: SHIPPED | OUTPATIENT
Start: 2021-05-05 | End: 2021-05-06 | Stop reason: SDUPTHER

## 2021-05-05 RX ORDER — BLOOD-GLUCOSE METER
1 KIT MISCELLANEOUS DAILY
Qty: 1 KIT | Refills: 0 | Status: SHIPPED | OUTPATIENT
Start: 2021-05-05 | End: 2021-05-11 | Stop reason: SDUPTHER

## 2021-05-05 RX ORDER — ORAL SEMAGLUTIDE 7 MG/1
7 TABLET ORAL DAILY
Qty: 3 TABLET | Refills: 0 | Status: CANCELLED | COMMUNITY
Start: 2021-05-05

## 2021-05-05 ASSESSMENT — ENCOUNTER SYMPTOMS
BACK PAIN: 1
EYES NEGATIVE: 1
GASTROINTESTINAL NEGATIVE: 1
RESPIRATORY NEGATIVE: 1

## 2021-05-05 NOTE — PROGRESS NOTES
advised to have a procedure done in the past.  She did follow-up with a surgeon but in the middle the pandemic she stopped following up due to to concern about safety in the hospitals. Delfina Gómez was who the patient was referred to by her OB/GYN. Patient has not made a follow-up appointment recently. Her periods are still very irregular.       Hemoglobin A1C (%)   Date Value   2021 7.0   2020 7.0             ( goal A1Cis < 7)   No results found for: LABMICR  No results found for: LDLCHOLESTEROL, LDLCALC    (goal LDL is <100)   AST (U/L)   Date Value   2020 10     ALT (U/L)   Date Value   2020 12     BUN (mg/dL)   Date Value   2020 11     BP Readings from Last 3 Encounters:   21 120/80   21 125/84   20 135/80          (goal 120/80)    Past Medical History:   Diagnosis Date    Anxiety     Arthritis     Bipolar disorder ([facility] [address]     Depression     Disc disease, degenerative, lumbar or lumbosacral 2002    Disc disease, degenerative, lumbar or lumbosacral     Fibromyalgia     GERD (gastroesophageal reflux disease)     IBS (irritable bowel syndrome)     Insulin resistance 2020    Migraines       Past Surgical History:   Procedure Laterality Date     SECTION  2005     SECTION  2008    CHOLECYSTECTOMY  2000    KNEE SURGERY Left 2012    SHOULDER SURGERY Right 2017    tendon tear    TOOTH EXTRACTION      WISDOM TOOTH EXTRACTION         Family History   Problem Relation Age of Onset    Diabetes Maternal Grandmother     Cervical Cancer Maternal Grandmother     Cancer Maternal Grandmother         melanoma    Diabetes Mother     Depression Mother     No Known Problems Father     Diabetes Brother     Cancer Maternal Grandfather     No Known Problems Paternal Grandmother     No Known Problems Paternal Grandfather     Breast Cancer Neg Hx     Colon Cancer Neg Hx     Uterine Cancer Neg Hx     Stroke Neg Hx  Heart Attack Neg Hx        Social History     Tobacco Use    Smoking status: Never Smoker    Smokeless tobacco: Never Used   Substance Use Topics    Alcohol use: Not Currently      Current Outpatient Medications   Medication Sig Dispense Refill    metFORMIN (GLUCOPHAGE) 500 MG tablet Take 1 tablet by mouth 2 times daily (with meals) 180 tablet 1    omeprazole (PRILOSEC) 20 MG delayed release capsule TAKE 1 CAPSULE BY MOUTH ONCE DAILY IN THE MORNING BEFORE BREAKFAST 90 capsule 1    Semaglutide (RYBELSUS) 3 MG TABS Lot [id number] Exp 2/22 30 tablet 0    glucose monitoring kit (FREESTYLE) monitoring kit 1 kit by Does not apply route daily 1 kit 0    blood glucose monitor strips Test 2 times a day & as needed for symptoms of irregular blood glucose. Dispense sufficient amount for indicated testing frequency plus additional to accommodate PRN testing needs. 300 strip 0    Lancets MISC 1 each by Does not apply route 2 times daily 300 each 1    diclofenac sodium (VOLTAREN) 1 % GEL Apply 2 g topically 2 times daily 50 g 0    cariprazine hcl (VRAYLAR) 1.5 MG capsule Take 1 capsule by mouth daily 2 capsule 0    Naproxen Sodium (ALEVE PO) Take by mouth      EPINEPHrine (EPIPEN 2-NARCISO) 0.3 MG/0.3ML SOAJ injection Inject 0.3 mLs into the muscle once for 1 dose Use as directed for allergic reaction 2 each 0    meloxicam (MOBIC) 15 MG tablet TAKE 1 TABLET BY MOUTH ONCE DAILY AS NEEDED FOR PAIN 30 tablet 1    lidocaine-prilocaine (EMLA) 2.5-2.5 % cream       Menthol, Topical Analgesic, (BIOFREEZE EX) Apply topically      SUMAtriptan (IMITREX) 100 MG tablet sumatriptan 100 mg tablet   TAKE 1 TABLET BY MOUTH ONCE AT ONSET, THEN REPEAT IN 1 HOUR FOR 1 DOSE IF STILL ONGOING. No current facility-administered medications for this visit.       Allergies   Allergen Reactions    Azithromycin Anaphylaxis    Bee Venom Anaphylaxis    Penicillins Anaphylaxis and Hives    Propoxyphene Anaphylaxis    Bupropion Hives    reflux disease without esophagitis  omeprazole (PRILOSEC) 20 MG delayed release capsule   3. Bipolar disorder, in partial remission, most recent episode mixed ([address]     4. Type 2 diabetes mellitus without complication, without long-term current use of insulin (HCC)  metFORMIN (GLUCOPHAGE) 500 MG tablet    Semaglutide (RYBELSUS) 3 MG TABS    glucose monitoring kit (FREESTYLE) monitoring kit    blood glucose monitor strips    Lancets MISC    Lipid, Fasting    Comprehensive Metabolic Panel, Fasting    Microalbumin, Ur   5. Migraine without aura and without status migrainosus, not intractable     6. Lumbar pain  AFL - Lisa Aguilar MD, Pain Management, [place]    diclofenac sodium (VOLTAREN) 1 % GEL   7. Need for 23-polyvalent pneumococcal polysaccharide vaccine  PNEUMOVAX 23 subcutaneous/IM (Pneumococcal polysaccharide vaccine 23-valent >= 3yo)   8. Need for hepatitis C screening test  Hepatitis C Antibody             Plan:    PCOS/abnormal test results in the past with OB/GYN-patient referred to  In the past for surgery. Recommended she call their office and make another appointment. With weight loss the patient's menstrual cycle would likely regulate more. Bipolar-patient states she is on the verge of going into shyam. Recommended she trial a mood stabilizer. In the past she has failed multiple medications and also has morbid obesity. I do not believe she would be a good candidate for the Zyprexa or Abilify due to these things. I would like to trial the patient on Vraylar. Samples were provided today. We will see how the patient tolerates the medication. If she is tolerating we will send a prescription over to the pharmacy. Discussed with her potential side effects. Patient verbalized understanding would like to try it. GERD-continue on PPI, monitor renal function. DM type II-patient to continue on Rybelsus. We will recheck an A1c in June.   Patient encouraged to get glucometer and monitor blood sugars fasting. Patient's foot exam was done today and is negative. Patient sees Kendy nelson for her eye exam.  We will attempt to get these records. Continue other medications follow-up in 3 months. Migraines-patient to continue on current medications, keep a migraine journal.  We will discuss this at each visit. Lumbar pain-patient is  requesting a referral to different pain management specialist due to the desire to be managed with pain medication. Advised the patient the importance of trying things like injections for more long-term solutions. I will give her referral to a different pain management specialist and continue to monitor. Return in about 3 months (around 8/5/2021) for diabetes.     Orders Placed This Encounter   Procedures    PNEUMOVAX 23 subcutaneous/IM (Pneumococcal polysaccharide vaccine 23-valent >= 1yo)    Lipid, Fasting     Standing Status:   Future     Standing Expiration Date:   5/5/2022    Comprehensive Metabolic Panel, Fasting     Standing Status:   Future     Standing Expiration Date:   5/5/2022   [place], Ur     Standing Status:   Future     Standing Expiration Date:   5/5/2022    Hepatitis C Antibody     Standing Status:   Future     Standing Expiration Date:   5/5/2022    AFL - Vivek Olson MD, Pain Management, [place]     Referral Priority:   Routine     Referral Type:   Eval and Treat     Referral Reason:   Specialty Services Required     Referred to Provider:   Maria E Moeller MD     Requested Specialty:   Pain Management     Number of Visits Requested:   1     Orders Placed This Encounter   Medications    metFORMIN (GLUCOPHAGE) 500 MG tablet     Sig: Take 1 tablet by mouth 2 times daily (with meals)     Dispense:  180 tablet     Refill:  1    omeprazole (PRILOSEC) 20 MG delayed release capsule     Sig: TAKE 1 CAPSULE BY MOUTH ONCE DAILY IN THE MORNING BEFORE BREAKFAST     Dispense:  90 capsule     Refill:  1    Semaglutide (RYBELSUS) 3 MG TABS Sig: Lot [id number] Exp      Dispense:  30 tablet     Refill:  0    glucose monitoring kit (FREESTYLE) monitoring kit     Si kit by Does not apply route daily     Dispense:  1 kit     Refill:  0    blood glucose monitor strips     Sig: Test 2 times a day & as needed for symptoms of irregular blood glucose. Dispense sufficient amount for indicated testing frequency plus additional to accommodate PRN testing needs. Dispense:  300 strip     Refill:  0     Brand per patient preference. May round up to next available package size.  Lancets MISC     Si each by Does not apply route 2 times daily     Dispense:  300 each     Refill:  1    diclofenac sodium (VOLTAREN) 1 % GEL     Sig: Apply 2 g topically 2 times daily     Dispense:  50 g     Refill:  0    cariprazine hcl (VRAYLAR) 1.5 MG capsule     Sig: Take 1 capsule by mouth daily     Dispense:  2 capsule     Refill:  0       Patient given educational materials - see patient instructions. Discussed use, benefit, and side effects of prescribed medications. All patientquestions answered. Pt voiced understanding. Reviewed health maintenance. Instructedto continue current medications, diet and exercise. Patient agreed with treatmentplan. Follow up as directed.      Electronically signed by Rancho Schrader MD on 2021 at 3:02 PM

## 2021-05-06 ENCOUNTER — TELEPHONE (OUTPATIENT)
Dept: FAMILY MEDICINE CLINIC | Age: 36
End: 2021-05-06

## 2021-05-06 DIAGNOSIS — E11.9 TYPE 2 DIABETES MELLITUS WITHOUT COMPLICATION, WITHOUT LONG-TERM CURRENT USE OF INSULIN (HCC): Primary | ICD-10-CM

## 2021-05-06 DIAGNOSIS — E66.01 CLASS 3 SEVERE OBESITY DUE TO EXCESS CALORIES WITH SERIOUS COMORBIDITY AND BODY MASS INDEX (BMI) OF 60.0 TO 69.9 IN ADULT (HCC): ICD-10-CM

## 2021-05-06 NOTE — TELEPHONE ENCOUNTER
Pharmacy called in requesting directions for rybelsus.      Please advise thank you      Next Visit Date:  Future Appointments   Date Time Provider Junaid Andersoni   8/5/2021 12:30 PM MD [facility] [address] Maintenance   Topic Date Due    Hepatitis C screen  Never done    Diabetic foot exam  Never done    Diabetic retinal exam  Never done    Lipid screen  Never done    COVID-19 Vaccine (1) Never done    Diabetic microalbuminuria test  Never done    Hepatitis B vaccine (1 of 3 - Risk 3-dose series) Never done    HIV screen  07/07/2021 (Originally 3/18/2000)    Flu vaccine (Season Ended) 09/01/2021    A1C test (Diabetic or Prediabetic)  03/17/2022    Cervical cancer screen  02/24/2025    DTaP/Tdap/Td vaccine (2 - Td) 08/05/2030    Pneumococcal 0-64 years Vaccine  Completed    Hepatitis A vaccine  Aged Out    Hib vaccine  Aged Out    Meningococcal (ACWY) vaccine  Aged Out    Varicella vaccine  Discontinued       Hemoglobin A1C (%)   Date Value   03/17/2021 7.0   07/07/2020 7.0             ( goal A1C is < 7)   No results found for: LABMICR  No results found for: LDLCHOLESTEROL, LDLCALC    (goal LDL is <100)   AST (U/L)   Date Value   06/09/2020 10     ALT (U/L)   Date Value   06/09/2020 12     BUN (mg/dL)   Date Value   06/09/2020 11     BP Readings from Last 3 Encounters:   05/05/21 120/80   03/17/21 125/84   11/02/20 135/80          (goal 120/80)    All Future Testing planned in [facility]  Lab Frequency Next Occurrence   Hemoglobin A1C Once 06/11/2021   MRI LUMBAR SPINE WO CONTRAST Once 11/02/2020   XR LUMBAR SPINE FLEXION AND EXTENSION ONLY Once 11/02/2020   Lipid, Fasting Once 05/05/2021   Comprehensive Metabolic Panel, Fasting Once 05/05/2021   Microalbumin, Ur Once 05/05/2021   Hepatitis C Antibody Once 05/05/2021               Patient Active Problem List:     Migraine without aura and without status migrainosus, not intractable     Anxiety     Hx laparoscopic

## 2021-05-10 ENCOUNTER — TELEPHONE (OUTPATIENT)
Dept: FAMILY MEDICINE CLINIC | Age: 36
End: 2021-05-10

## 2021-05-11 DIAGNOSIS — E11.9 TYPE 2 DIABETES MELLITUS WITHOUT COMPLICATION, WITHOUT LONG-TERM CURRENT USE OF INSULIN (HCC): ICD-10-CM

## 2021-05-11 NOTE — TELEPHONE ENCOUNTER
Patient states that [address] [facility] [address] didn't try her on any of the medications.  Patient states that she will like to think about those medication and do more research and let you know, if she would like to try any

## 2021-05-12 RX ORDER — BLOOD-GLUCOSE METER
1 KIT MISCELLANEOUS DAILY
Qty: 1 KIT | Refills: 0 | Status: SHIPPED | OUTPATIENT
Start: 2021-05-12 | End: 2021-08-05

## 2021-07-07 ENCOUNTER — HOSPITAL ENCOUNTER (OUTPATIENT)
Age: 36
Setting detail: SPECIMEN
Discharge: HOME | End: 2021-07-07
Payer: COMMERCIAL

## 2021-07-07 DIAGNOSIS — Z11.59 NEED FOR HEPATITIS C SCREENING TEST: ICD-10-CM

## 2021-07-07 DIAGNOSIS — E11.9 TYPE 2 DIABETES MELLITUS WITHOUT COMPLICATION, WITHOUT LONG-TERM CURRENT USE OF INSULIN (HCC): ICD-10-CM

## 2021-07-08 LAB
ALBUMIN SERPL-MCNC: 3.7 G/DL (ref 3.5–5.2)
ALBUMIN/GLOBULIN RATIO: 0.9 (ref 1–2.5)
ALP BLD-CCNC: 82 U/L (ref 35–104)
ALT SERPL-CCNC: 17 U/L (ref 5–33)
ANION GAP SERPL CALCULATED.3IONS-SCNC: 17 MMOL/L (ref 9–17)
AST SERPL-CCNC: 17 U/L
BILIRUB SERPL-MCNC: 0.25 MG/DL (ref 0.3–1.2)
BUN BLDV-MCNC: 12 MG/DL (ref 6–20)
BUN/CREAT BLD: ABNORMAL (ref 9–20)
CALCIUM SERPL-MCNC: 9.4 MG/DL (ref 8.6–10.4)
CHLORIDE BLD-SCNC: 103 MMOL/L (ref 98–107)
CHOLESTEROL, FASTING: 120 MG/DL
CHOLESTEROL/HDL RATIO: 3.1
CO2: 22 MMOL/L (ref 20–31)
CREAT SERPL-MCNC: 0.72 MG/DL (ref 0.5–0.9)
GFR AFRICAN AMERICAN: >60 ML/MIN
GFR NON-AFRICAN AMERICAN: >60 ML/MIN
GFR SERPL CREATININE-BSD FRML MDRD: ABNORMAL ML/MIN/{1.73_M2}
GFR SERPL CREATININE-BSD FRML MDRD: ABNORMAL ML/MIN/{1.73_M2}
GLUCOSE FASTING: 154 MG/DL (ref 70–99)
HDLC SERPL-MCNC: 39 MG/DL
HEPATITIS C ANTIBODY: NONREACTIVE
LDL CHOLESTEROL: 49 MG/DL (ref 0–130)
POTASSIUM SERPL-SCNC: 4.3 MMOL/L (ref 3.7–5.3)
SODIUM BLD-SCNC: 142 MMOL/L (ref 135–144)
TOTAL PROTEIN: 7.7 G/DL (ref 6.4–8.3)
TRIGLYCERIDE, FASTING: 161 MG/DL
VLDLC SERPL CALC-MCNC: ABNORMAL MG/DL (ref 1–30)

## 2021-07-09 ENCOUNTER — HOSPITAL ENCOUNTER (OUTPATIENT)
Age: 36
Setting detail: SPECIMEN
Discharge: HOME | End: 2021-07-09
Payer: COMMERCIAL

## 2021-07-09 DIAGNOSIS — E11.9 TYPE 2 DIABETES MELLITUS WITHOUT COMPLICATION, WITHOUT LONG-TERM CURRENT USE OF INSULIN (HCC): ICD-10-CM

## 2021-07-09 LAB
CREATININE URINE: 183 MG/DL (ref 28–217)
MICROALBUMIN/CREAT 24H UR: <12 MG/L
MICROALBUMIN/CREAT UR-RTO: NORMAL MCG/MG CREAT

## 2021-07-23 DIAGNOSIS — M54.50 LUMBAR PAIN: ICD-10-CM

## 2021-07-23 NOTE — TELEPHONE ENCOUNTER
Received faxed medication refill request, prescription pended. Please advise, thank you! Next Visit Date:  Future Appointments   Date Time Provider Junaid Ny   2021 12:30 PM [facility] [address] Maintenance   Topic Date Due    Diabetic foot exam  Never done    Diabetic retinal exam  Never done    COVID-19 Vaccine (1) Never done    HIV screen  Never done    Hepatitis B vaccine (1 of 3 - Risk 3-dose series) Never done    Flu vaccine (1) 2021    A1C test (Diabetic or Prediabetic)  2022    Lipid screen  2022    Diabetic microalbuminuria test  2022    Cervical cancer screen  2025    DTaP/Tdap/Td vaccine (2 - Td or Tdap) 2030    Pneumococcal 0-64 years Vaccine (2 of 2 - PPSV23) 2050    Hepatitis C screen  Completed    Hepatitis A vaccine  Aged Out    Hib vaccine  Aged Out    Meningococcal (ACWY) vaccine  Aged Out    Varicella vaccine  Discontinued       Hemoglobin A1C (%)   Date Value   2021 7.0   2020 7.0             ( goal A1C is < 7)   Microalb/Crt.  Ratio (mcg/mg creat)   Date Value   2021 CANNOT BE CALCULATED     LDL Cholesterol (mg/dL)   Date Value   2021 49       (goal LDL is <100)   AST (U/L)   Date Value   2021 17     ALT (U/L)   Date Value   2021 17     BUN (mg/dL)   Date Value   2021 12     BP Readings from Last 3 Encounters:   21 120/80   21 125/84   20 135/80          (goal 120/80)    All Future Testing planned in [facility]  Lab Frequency Next Occurrence   MRI LUMBAR SPINE WO CONTRAST Once 2020   XR LUMBAR SPINE FLEXION AND EXTENSION ONLY Once 2020               Patient Active Problem List:     Migraine without aura and without status migrainosus, not intractable     Anxiety     Hx laparoscopic cholecystectomy     Hx of  section     History of third molar tooth extraction     PCOS (polycystic ovarian syndrome)     Family history of diabetes mellitus (DM)     Family history of colon cancer in father     Lumbar pain     Type 2 diabetes mellitus without complication, without long-term current use of insulin (HCC)     Menorrhagia with irregular cycle     Endometrial thickening on ultrasound     Bulky or enlarged uterus     Pars defect of lumbar spine     Chronic pain of left knee     Bipolar disorder (HCC)     Depressive disorder     Migraine

## 2021-07-29 DIAGNOSIS — E11.9 TYPE 2 DIABETES MELLITUS WITHOUT COMPLICATION, WITHOUT LONG-TERM CURRENT USE OF INSULIN (HCC): ICD-10-CM

## 2021-07-29 DIAGNOSIS — E66.01 CLASS 3 SEVERE OBESITY DUE TO EXCESS CALORIES WITH SERIOUS COMORBIDITY AND BODY MASS INDEX (BMI) OF 60.0 TO 69.9 IN ADULT (HCC): ICD-10-CM

## 2021-08-05 ENCOUNTER — OFFICE VISIT (OUTPATIENT)
Dept: FAMILY MEDICINE CLINIC | Age: 36
End: 2021-08-05
Payer: COMMERCIAL

## 2021-08-05 VITALS
TEMPERATURE: 97.3 F | BODY MASS INDEX: 61.08 KG/M2 | DIASTOLIC BLOOD PRESSURE: 79 MMHG | HEART RATE: 87 BPM | SYSTOLIC BLOOD PRESSURE: 117 MMHG | WEIGHT: 293 LBS

## 2021-08-05 DIAGNOSIS — E66.01 CLASS 3 SEVERE OBESITY DUE TO EXCESS CALORIES WITH SERIOUS COMORBIDITY AND BODY MASS INDEX (BMI) OF 60.0 TO 69.9 IN ADULT (HCC): ICD-10-CM

## 2021-08-05 DIAGNOSIS — E11.9 TYPE 2 DIABETES MELLITUS WITHOUT COMPLICATION, WITHOUT LONG-TERM CURRENT USE OF INSULIN (HCC): Primary | ICD-10-CM

## 2021-08-05 DIAGNOSIS — R37 SEXUAL DYSFUNCTION: ICD-10-CM

## 2021-08-05 DIAGNOSIS — T63.444D: ICD-10-CM

## 2021-08-05 LAB — HBA1C MFR BLD: 7.8 %

## 2021-08-05 PROCEDURE — 83036 HEMOGLOBIN GLYCOSYLATED A1C: CPT | Performed by: FAMILY MEDICINE

## 2021-08-05 PROCEDURE — 3051F HG A1C>EQUAL 7.0%<8.0%: CPT | Performed by: FAMILY MEDICINE

## 2021-08-05 PROCEDURE — 99214 OFFICE O/P EST MOD 30 MIN: CPT | Performed by: FAMILY MEDICINE

## 2021-08-05 PROCEDURE — 1036F TOBACCO NON-USER: CPT | Performed by: FAMILY MEDICINE

## 2021-08-05 PROCEDURE — G8427 DOCREV CUR MEDS BY ELIG CLIN: HCPCS | Performed by: FAMILY MEDICINE

## 2021-08-05 PROCEDURE — 2022F DILAT RTA XM EVC RTNOPTHY: CPT | Performed by: FAMILY MEDICINE

## 2021-08-05 PROCEDURE — G8417 CALC BMI ABV UP PARAM F/U: HCPCS | Performed by: FAMILY MEDICINE

## 2021-08-05 RX ORDER — EPINEPHRINE 0.3 MG/.3ML
0.3 INJECTION SUBCUTANEOUS ONCE
Qty: 2 EACH | Refills: 0 | Status: SHIPPED | OUTPATIENT
Start: 2021-08-05 | End: 2021-08-05

## 2021-08-05 SDOH — ECONOMIC STABILITY: FOOD INSECURITY: WITHIN THE PAST 12 MONTHS, THE FOOD YOU BOUGHT JUST DIDN'T LAST AND YOU DIDN'T HAVE MONEY TO GET MORE.: NEVER TRUE

## 2021-08-05 SDOH — ECONOMIC STABILITY: FOOD INSECURITY: WITHIN THE PAST 12 MONTHS, YOU WORRIED THAT YOUR FOOD WOULD RUN OUT BEFORE YOU GOT MONEY TO BUY MORE.: NEVER TRUE

## 2021-08-05 ASSESSMENT — SOCIAL DETERMINANTS OF HEALTH (SDOH): HOW HARD IS IT FOR YOU TO PAY FOR THE VERY BASICS LIKE FOOD, HOUSING, MEDICAL CARE, AND HEATING?: NOT HARD AT ALL

## 2021-08-05 NOTE — PROGRESS NOTES
[address] PRIMARY CARE  [address] [place] [address] [address] [place] [address]  Dept: [phone]  Dept Fax: [phone]    Massimo Wallis is a 39 y.o. female who is a Established patient, who presents today for her medical conditions/complaints as noted below:  Chief Complaint   Patient presents with    Diabetes         HPI:     The patient is a 39year old female with a past medical history signifnicant for diabetes, morbid obesity, migraines, back pain, depression who presents for 3 month check up. The patient states her blood sugars have been between 100s to the 180s. She has tried to change her diet but does not give me specifics that she is done. Patient does not exercise frequently because of her back pain. Patient notes significant sexual dysfunction. She is actually requesting a trial of Addyi. She states her biggest issue is her body image. Her partner is very supportive and tells her that she is beautiful and he would like to have intercourse with her but she continues to have difficulty with this because of when she views herself. Diabetes  She presents for her follow-up diabetic visit. She has type 2 diabetes mellitus. Her disease course has been stable. There are no hypoglycemic associated symptoms. There are no diabetic associated symptoms. There are no hypoglycemic complications. Symptoms are stable. There are no diabetic complications. Risk factors for coronary artery disease include obesity. Current diabetic treatment includes oral agent (dual therapy). She is compliant with treatment all of the time. Her breakfast blood glucose range is generally 110-130 mg/dl. Her lunch blood glucose range is generally 140-180 mg/dl. Her dinner blood glucose range is generally 110-130 mg/dl. Hemoglobin A1C (%)   Date Value   08/05/2021 7.8   03/17/2021 7.0   07/07/2020 7.0             ( goal A1Cis < 7)   Microalb/Crt.  Ratio (mcg/mg creat)   Date Value   07/09/2021 CANNOT BE CALCULATED     LDL Cholesterol (mg/dL)   Date Value   2021 49       (goal LDL is <100)   AST (U/L)   Date Value   2021 17     ALT (U/L)   Date Value   2021 17     BUN (mg/dL)   Date Value   2021 12     BP Readings from Last 3 Encounters:   21 117/79   21 120/80   21 125/84          (goal 120/80)    Past Medical History:   Diagnosis Date    Anxiety     Arthritis     Bipolar disorder (HCC)     Depression     Disc disease, degenerative, lumbar or lumbosacral 2002    Disc disease, degenerative, lumbar or lumbosacral     Fibromyalgia     GERD (gastroesophageal reflux disease)     IBS (irritable bowel syndrome)     Insulin resistance 2020    Migraines       Past Surgical History:   Procedure Laterality Date     SECTION  2005     SECTION  2008    CHOLECYSTECTOMY  2000    KNEE SURGERY Left 2012    SHOULDER SURGERY Right 2017    tendon tear    TOOTH EXTRACTION      WISDOM TOOTH EXTRACTION         Family History   Problem Relation Age of Onset    Diabetes Maternal Grandmother     Cervical Cancer Maternal Grandmother     Cancer Maternal Grandmother         melanoma    Diabetes Mother     Depression Mother     No Known Problems Father     Diabetes Brother     Cancer Maternal Grandfather     No Known Problems Paternal Grandmother     No Known Problems Paternal Grandfather     Breast Cancer Neg Hx     Colon Cancer Neg Hx     Uterine Cancer Neg Hx     Stroke Neg Hx     Heart Attack Neg Hx        Social History     Tobacco Use    Smoking status: Never Smoker    Smokeless tobacco: Never Used   Substance Use Topics    Alcohol use: Not Currently      Current Outpatient Medications   Medication Sig Dispense Refill    EPINEPHrine (EPIPEN 2-NARCISO) 0.3 MG/0.3ML SOAJ injection Inject 0.3 mLs into the muscle once for 1 dose Use as directed for allergic reaction 2 each 0    dapagliflozin (FARXIGA) 5 MG tablet Take 1 tablet by mouth every morning 30 tablet 0    Flibanserin 100 MG TABS Take 100 mg by mouth daily 30 tablet 0    diclofenac sodium (VOLTAREN) 1 % GEL Apply 2 g topically 2 times daily 50 g 0    metFORMIN (GLUCOPHAGE) 500 MG tablet Take 1 tablet by mouth 2 times daily (with meals) 180 tablet 1    omeprazole (PRILOSEC) 20 MG delayed release capsule TAKE 1 CAPSULE BY MOUTH ONCE DAILY IN THE MORNING BEFORE BREAKFAST 90 capsule 1    cariprazine hcl (VRAYLAR) 1.5 MG capsule Take 1 capsule by mouth daily 2 capsule 0    Naproxen Sodium (ALEVE PO) Take by mouth      meloxicam (MOBIC) 15 MG tablet TAKE 1 TABLET BY MOUTH ONCE DAILY AS NEEDED FOR PAIN 30 tablet 1    lidocaine-prilocaine (EMLA) 2.5-2.5 % cream       SUMAtriptan (IMITREX) 100 MG tablet sumatriptan 100 mg tablet   TAKE 1 TABLET BY MOUTH ONCE AT ONSET, THEN REPEAT IN 1 HOUR FOR 1 DOSE IF STILL ONGOING.  Semaglutide 3 MG TABS Take 3 mg by mouth daily LOT [id number]  Exp:  4/2023 30 tablet 0    Menthol, Topical Analgesic, (BIOFREEZE EX) Apply topically (Patient not taking: Reported on 8/5/2021)       No current facility-administered medications for this visit.      Allergies   Allergen Reactions    Azithromycin Anaphylaxis    Bee Venom Anaphylaxis    Penicillins Anaphylaxis and Hives    Propoxyphene Anaphylaxis    Bupropion Hives    Cephalexin     Gabapentin      [place] helicopters     Levofloxacin Hives    Motrin [Ibuprofen] Nausea Only    Paroxetine Hcl Hives    Rofecoxib     Zoloft [Sertraline Hcl]        Health Maintenance   Topic Date Due    Diabetic foot exam  Never done    Diabetic retinal exam  Never done    COVID-19 Vaccine (1) Never done    Hepatitis B vaccine (1 of 3 - Risk 3-dose series) Never done    Flu vaccine (1) 09/01/2021    Lipid screen  07/07/2022    Diabetic microalbuminuria test  07/09/2022    A1C test (Diabetic or Prediabetic)  08/05/2022    Cervical cancer screen  02/24/2025    DTaP/Tdap/Td vaccine (2 - Td or Tdap) 08/05/2030    Pneumococcal 0-64 years Vaccine (2 of 2 - PPSV23) 03/18/2050    Hepatitis C screen  Completed    Hepatitis A vaccine  Aged Out    Hib vaccine  Aged Out    Meningococcal (ACWY) vaccine  Aged Out    Varicella vaccine  Discontinued    HIV screen  Discontinued       Subjective:     Review of Systems   Constitutional: Negative. HENT: Negative. Eyes: Negative. Respiratory: Negative. Cardiovascular: Negative. Gastrointestinal: Negative. Genitourinary: Negative. Musculoskeletal: Negative. Skin: Negative. Allergic/Immunologic: Negative for environmental allergies, food allergies and immunocompromised state. Neurological: Negative. Psychiatric/Behavioral: Negative. Objective:     Physical Exam  Vitals and nursing note reviewed. Constitutional:       General: She is awake. She is not in acute distress. Appearance: Normal appearance. She is obese. She is not ill-appearing, toxic-appearing or diaphoretic. HENT:      Head: Normocephalic and atraumatic. Right Ear: External ear normal.      Left Ear: External ear normal.      Nose: Nose normal.   Eyes:      Extraocular Movements: Extraocular movements intact. Conjunctiva/sclera: Conjunctivae normal.   Cardiovascular:      Rate and Rhythm: Normal rate and regular rhythm. Pulses: Normal pulses. Heart sounds: Normal heart sounds. No murmur heard. No friction rub. No gallop. Pulmonary:      Effort: Pulmonary effort is normal. No respiratory distress. Breath sounds: Normal breath sounds. No stridor. No wheezing, rhonchi or rales. Abdominal:      General: Abdomen is flat. Bowel sounds are normal.      Palpations: Abdomen is soft. Musculoskeletal:         General: Normal range of motion. Cervical back: Normal range of motion and neck supple. Neurological:      General: No focal deficit present. Mental Status: She is alert and oriented to person, place, and time. Mental status is at baseline. Psychiatric:         Attention and Perception: Attention normal.         Mood and Affect: Mood and affect normal.         Speech: Speech normal.         Behavior: Behavior normal.         Thought Content: Thought content normal.         Judgment: Judgment normal.       /79 (Site: Right Wrist, Position: Sitting, Cuff Size: Child)   Pulse 87   Temp 97.3 °F (36.3 °C) (Temporal)   Wt (!) 390 lb (176.9 kg)   BMI 61.08 kg/m²     Assessment:       Diagnosis Orders   1. Type 2 diabetes mellitus without complication, without long-term current use of insulin ([facility])  POCT glycosylated hemoglobin (Hb A1C)    dapagliflozin (FARXIGA) 5 MG tablet   2. Toxic effect of venom of bees, undetermined intent, subsequent encounter  EPINEPHrine (EPIPEN 2-NARCISO) 0.3 MG/0.3ML SOAJ injection   3. Sexual dysfunction  Flibanserin 100 MG TABS             Plan:    Diabetes-A1c is increased from 7-7.8. I would like her to continue on the Metformin and semaglutide. I would like to add [place] at this time to see if there could potentially be an added weight loss benefit. The patient did request weight loss aide but had other complaints that were more pressing so discussed with the patient talking about adding a weight loss medication at the next visit    H/o anaphylaxis - epi pen refilled    Sexual dysfunction -discussed with patient different treatment options including therapy. The patient specifically requested the medication. Advised her of the potential of interaction with her Vraylar. The patient verbalized understanding of this potential but would like to pursue the medication. We will have her follow-up in 1 month. Return in about 2 months (around 10/5/2021) for assess medication.     Orders Placed This Encounter   Procedures    POCT glycosylated hemoglobin (Hb A1C)     Orders Placed This Encounter   Medications    EPINEPHrine (EPIPEN 2-NARCISO) 0.3 MG/0.3ML SOAJ injection     Sig: Inject 0.3 mLs into the muscle once for 1 dose Use as directed for allergic reaction     Dispense:  2 each     Refill:  0    dapagliflozin (FARXIGA) 5 MG tablet     Sig: Take 1 tablet by mouth every morning     Dispense:  30 tablet     Refill:  0    Flibanserin 100 MG TABS     Sig: Take 100 mg by mouth daily     Dispense:  30 tablet     Refill:  0       Patient given educational materials - see patient instructions. Discussed use, benefit, and side effects of prescribed medications. All patientquestions answered. Pt voiced understanding. Reviewed health maintenance. Instructedto continue current medications, diet and exercise. Patient agreed with treatmentplan. Follow up as directed.      Electronically signed by Remington Randall MD on 8/9/2021 at 6:03 AM

## 2021-08-09 ENCOUNTER — TELEPHONE (OUTPATIENT)
Dept: PAIN MANAGEMENT | Age: 36
End: 2021-08-09

## 2021-08-09 ASSESSMENT — ENCOUNTER SYMPTOMS
EYES NEGATIVE: 1
GASTROINTESTINAL NEGATIVE: 1
RESPIRATORY NEGATIVE: 1

## 2021-08-12 ENCOUNTER — OFFICE VISIT (OUTPATIENT)
Dept: PAIN MANAGEMENT | Age: 36
End: 2021-08-12
Payer: COMMERCIAL

## 2021-08-12 VITALS
SYSTOLIC BLOOD PRESSURE: 141 MMHG | DIASTOLIC BLOOD PRESSURE: 82 MMHG | BODY MASS INDEX: 45.99 KG/M2 | HEIGHT: 67 IN | WEIGHT: 293 LBS

## 2021-08-12 DIAGNOSIS — M54.50 LUMBAR PAIN: Primary | ICD-10-CM

## 2021-08-12 PROCEDURE — 99213 OFFICE O/P EST LOW 20 MIN: CPT | Performed by: NURSE PRACTITIONER

## 2021-08-12 RX ORDER — CYCLOBENZAPRINE HCL 10 MG
TABLET ORAL
Qty: 30 TABLET | Refills: 1 | Status: SHIPPED | OUTPATIENT
Start: 2021-08-12 | End: 2021-09-10

## 2021-08-12 ASSESSMENT — ENCOUNTER SYMPTOMS
CONSTIPATION: 0
COUGH: 0
BACK PAIN: 1
SHORTNESS OF BREATH: 0

## 2021-08-12 NOTE — PROGRESS NOTES
Patient is here today for OV    Chief Complaint: back pain    [facility]   Patient complains of low back pain that started years ago. She also complains of left knee pain. 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 XR left knee with early isteoarthritic changes. She was seeing pain management in [place] and states she was taking hydrocodone. She states this is the only thing that manages her pain. She has never had surgery on her back. She has done PT in the past with no benefit but states she does continue to do home exercises. She has also been diagnosed with fibromyalgia. Pt is taking aleve and muscle relaxant with benefit.      Lumbar MRI and XR were ordered at last visit in November and patient has not completed. Again discussed her options for treatment such as injections and PT. She states PT makes her pain worse. She would like to do some research of her own on the injections before considering scheduling. Back Pain  This is a chronic problem. The current episode started more than 1 year ago. The problem occurs constantly. The problem is unchanged. The pain is present in the lumbar spine. The quality of the pain is described as aching. Radiates to: rarely down right leg to knee. The pain is at a severity of 7/10. The pain is moderate. The symptoms are aggravated by position and standing (walking). Associated symptoms include paresthesias. Pertinent negatives include no chest pain, fever, numbness or tingling. She has tried analgesics, bed rest and muscle relaxant for the symptoms. The treatment provided mild relief. Knee Pain   The incident occurred more than 1 week ago. There was no injury mechanism. The pain is present in the right knee. The quality of the pain is described as aching. The pain is at a severity of 4/10. The pain is mild. The pain has been fluctuating since onset. Pertinent negatives include no numbness or tingling. The symptoms are aggravated by movement and weight bearing.  She has tried non-weight bearing, rest, heat and ice for the symptoms. The treatment provided mild relief. Patient denies any new neurological symptoms. No bowel or bladder incontinence, no weakness, and no falling.     Past Medical History:   Diagnosis Date    Anxiety     Arthritis     Bipolar disorder ([facility] [address]     Depression     Disc disease, degenerative, lumbar or lumbosacral     Disc disease, degenerative, lumbar or lumbosacral     Fibromyalgia     GERD (gastroesophageal reflux disease)     IBS (irritable bowel syndrome)     Insulin resistance 2020    Migraines        Past Surgical History:   Procedure Laterality Date     SECTION  2005     SECTION  2008    CHOLECYSTECTOMY  2000    KNEE SURGERY Left 2012    SHOULDER SURGERY Right 2017    tendon tear    TOOTH EXTRACTION      WISDOM TOOTH EXTRACTION         Allergies   Allergen Reactions    Azithromycin Anaphylaxis    Bee Venom Anaphylaxis    Penicillins Anaphylaxis and Hives    Propoxyphene Anaphylaxis    Bupropion Hives    Cephalexin     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     Levofloxacin Hives    Motrin [Ibuprofen] Nausea Only    Paroxetine Hcl Hives    Rofecoxib     Zoloft [Sertraline Hcl]          Current Outpatient Medications:     EPINEPHrine (EPIPEN 2-NARCISO) 0.3 MG/0.3ML SOAJ injection, Inject 0.3 mLs into the muscle once for 1 dose Use as directed for allergic reaction, Disp: 2 each, Rfl: 0    dapagliflozin (FARXIGA) 5 MG tablet, Take 1 tablet by mouth every morning, Disp: 30 tablet, Rfl: 0    Flibanserin 100 MG TABS, Take 100 mg by mouth daily, Disp: 30 tablet, Rfl: 0    Semaglutide 3 MG TABS, Take 3 mg by mouth daily LOT [id number] Exp:  2023, Disp: 30 tablet, Rfl: 0    diclofenac sodium (VOLTAREN) 1 % GEL, Apply 2 g topically 2 times daily, Disp: 50 g, Rfl: 0    metFORMIN (GLUCOPHAGE) 500 MG tablet, Take 1 tablet by mouth 2 times daily (with meals), Disp: 180 tablet, Rfl: 1    omeprazole (PRILOSEC) 20 MG delayed release capsule, TAKE 1 CAPSULE BY MOUTH ONCE DAILY IN THE MORNING BEFORE BREAKFAST, Disp: 90 capsule, Rfl: 1    cariprazine hcl (VRAYLAR) 1.5 MG capsule, Take 1 capsule by mouth daily, Disp: 2 capsule, Rfl: 0    Naproxen Sodium (ALEVE PO), Take by mouth, Disp: , Rfl:     meloxicam (MOBIC) 15 MG tablet, TAKE 1 TABLET BY MOUTH ONCE DAILY AS NEEDED FOR PAIN, Disp: 30 tablet, Rfl: 1    lidocaine-prilocaine (EMLA) 2.5-2.5 % cream, , Disp: , Rfl:     Menthol, Topical Analgesic, (BIOFREEZE EX), Apply topically (Patient not taking: Reported on 8/5/2021), Disp: , Rfl:     SUMAtriptan (IMITREX) 100 MG tablet, sumatriptan 100 mg tablet  TAKE 1 TABLET BY MOUTH ONCE AT ONSET, THEN REPEAT IN 1 HOUR FOR 1 DOSE IF STILL ONGOING., Disp: , Rfl:     Family History   Problem Relation Age of Onset    Diabetes Maternal Grandmother     Cervical Cancer Maternal Grandmother     Cancer Maternal Grandmother         melanoma    Diabetes Mother     Depression Mother     No Known Problems Father     Diabetes Brother     Cancer Maternal Grandfather     No Known Problems Paternal Grandmother     No Known Problems Paternal Grandfather     Breast Cancer Neg Hx     Colon Cancer Neg Hx     Uterine Cancer Neg Hx     Stroke Neg Hx     Heart Attack Neg Hx        Social History     Socioeconomic History    Marital status:      Spouse name: Not on file    Number of children: Not on file    Years of education: Not on file    Highest education level: Not on file   Occupational History    Not on file   Tobacco Use    Smoking status: Never Smoker    Smokeless tobacco: Never Used   Vaping Use    Vaping Use: Never used   Substance and Sexual Activity    Alcohol use: Not Currently    Drug use: Never    Sexual activity: Yes     Partners: Male   Other Topics Concern    Not on file   Social History Narrative    Not on file     Social Determinants of Health     Financial Resource Strain: Low Risk     Difficulty of Paying Living Expenses: Not hard at all   Food Insecurity: No Food Insecurity    Worried About Running Out of Food in the Last Year: Never true    Ran Out of Food in the Last Year: Never true   Transportation Needs: No Transportation Needs    Lack of Transportation (Medical): No    Lack of Transportation (Non-Medical): No   Physical Activity:     Days of Exercise per Week:     Minutes of Exercise per Session:    Stress:     Feeling of Stress :    Social Connections:     Frequency of Communication with Friends and Family:     Frequency of Social Gatherings with Friends and Family:     Attends [employer] Services:     Active Member of Clubs or Organizations:     Attends Club or Organization Meetings:     Marital Status:    Intimate Partner Violence:     Fear of Current or Ex-Partner:     Emotionally Abused:     Physically Abused:     Sexually Abused:        Review of Systems:  Review of Systems   Constitutional: Negative for chills and fever. Cardiovascular: Negative for chest pain and palpitations. Respiratory: Negative for cough and shortness of breath. Musculoskeletal: Positive for back pain and joint pain. Gastrointestinal: Negative for constipation. Neurological: Positive for paresthesias. Negative for disturbances in coordination, loss of balance, numbness and tingling. Physical Exam:  BP (!) 141/82   Ht 5' 7\" (1.702 m)   Wt (!) 392 lb 12.8 oz (178.2 kg)   BMI 61.52 kg/m²     Physical Exam  HENT:      Head: Normocephalic. Pulmonary:      Effort: Pulmonary effort is normal.   Musculoskeletal:         General: Normal range of motion. Cervical back: Normal range of motion. Lumbar back: Tenderness present. Skin:     General: Skin is warm and dry. Neurological:      Mental Status: She is alert and oriented to person, place, and time.          Record/Diagnostics Review:    As reviewed in PMH    Assessment:  Problem List Items Addressed This Visit Lumbar pain - Primary    Relevant Medications    cyclobenzaprine (FLEXERIL) 10 MG tablet    Other Relevant Orders    MRI LUMBAR SPINE WO CONTRAST             Treatment Plan:  Refill flexeril  Continue Aleve  MRI re-ordered to guide treatment plan. Pt considering injections. Did use the spine model to explain procedures and answer questions.  Pt would like to do some research on her own before scheduling anything  Consider PT  Follow up after imaging

## 2021-08-13 ENCOUNTER — TELEPHONE (OUTPATIENT)
Dept: FAMILY MEDICINE CLINIC | Age: 36
End: 2021-08-13

## 2021-08-13 DIAGNOSIS — G43.009 MIGRAINE WITHOUT AURA AND WITHOUT STATUS MIGRAINOSUS, NOT INTRACTABLE: Primary | ICD-10-CM

## 2021-08-13 RX ORDER — SUMATRIPTAN 100 MG/1
TABLET, FILM COATED ORAL
Qty: 9 TABLET | Refills: 5 | Status: SHIPPED | OUTPATIENT
Start: 2021-08-13

## 2021-08-13 NOTE — TELEPHONE ENCOUNTER
----- Message from [place] sent at 8/12/2021  5:48 PM EDT -----  Subject: Refill Request    QUESTIONS  Name of Medication? SUMAtriptan (IMITREX) 100 MG tablet  Patient-reported dosage and instructions? sumatriptan 100 mg tablet TAKE 1   TABLET BY MOUTH ONCE AT ONSET, THEN REPEAT IN 1 HOUR FOR 1 DOSE IF STILL   ONGOING. How many days do you have left? 0  Preferred Pharmacy? [id number] [facility]  Pharmacy phone number (if available)? [phone]  Additional Information for Provider? Patient is out of medication and   needing a refill as soon as possible   ---------------------------------------------------------------------------  --------------  CALL BACK INFO  What is the best way for the office to contact you? OK to leave message on   voicemail  Preferred Call Back Phone Number?  [phone]

## 2021-10-11 ENCOUNTER — TELEPHONE (OUTPATIENT)
Dept: FAMILY MEDICINE CLINIC | Age: 36
End: 2021-10-11

## 2021-12-22 NOTE — PROGRESS NOTES
Called pt to let her know there is an outstanding MRI of Lumbar spine order waiting to be completed and asked if she plans to get these done. Patient has not follow up visit scheduled at this time. Per patient, she states it is very difficult for her to lay flat but inquired as to an open MRI facility she could check into. Gave patient the information on [facility] ([facility]) on [facility]. Patient will consider this facility and call us back if she needs the order faxed to said facility. Dr. Ridley Lara office notified. They will address the UA results. Patient's mother notified. Pt. Will need refill of new dose Baclofen the end of this week - next appt. 10/16/2017 - [address] [place].

## 2022-09-27 ENCOUNTER — OFFICE VISIT (OUTPATIENT)
Dept: PAIN MANAGEMENT | Age: 37
End: 2022-09-27
Payer: COMMERCIAL

## 2022-09-27 VITALS — BODY MASS INDEX: 45.99 KG/M2 | WEIGHT: 293 LBS | HEIGHT: 67 IN

## 2022-09-27 DIAGNOSIS — M54.50 LUMBAR PAIN: ICD-10-CM

## 2022-09-27 DIAGNOSIS — M43.06 PARS DEFECT OF LUMBAR SPINE: ICD-10-CM

## 2022-09-27 DIAGNOSIS — M54.50 CHRONIC LOW BACK PAIN, UNSPECIFIED BACK PAIN LATERALITY, UNSPECIFIED WHETHER SCIATICA PRESENT: Primary | ICD-10-CM

## 2022-09-27 DIAGNOSIS — G89.29 CHRONIC LOW BACK PAIN, UNSPECIFIED BACK PAIN LATERALITY, UNSPECIFIED WHETHER SCIATICA PRESENT: Primary | ICD-10-CM

## 2022-09-27 PROCEDURE — 99213 OFFICE O/P EST LOW 20 MIN: CPT | Performed by: NURSE PRACTITIONER

## 2022-09-27 RX ORDER — TIZANIDINE 4 MG/1
TABLET ORAL
Qty: 30 TABLET | Refills: 0 | Status: SHIPPED | OUTPATIENT
Start: 2022-09-27

## 2022-09-27 ASSESSMENT — ENCOUNTER SYMPTOMS
CONSTIPATION: 0
BACK PAIN: 1
COUGH: 0
SHORTNESS OF BREATH: 0
BOWEL INCONTINENCE: 0

## 2022-09-27 NOTE — PROGRESS NOTES
Chief Complaint   Patient presents with    Back Pain    Knee Pain         [facility]  Pt has not been seen in this office in over a year. Patient complains of low back pain that started years ago. She also complains of left knee pain. 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 XR left knee with early osteoarthritic changes. She was seeing pain management in [facility] and states she was taking hydrocodone. She states this is the only thing that manages her pain. She has never had surgery on her back. She has done PT in the past with no benefit but states she does continue to do home exercises. She has also been diagnosed with fibromyalgia. Pt is taking aleve and muscle relaxant with benefit. She is requesting a refill of zanaflex and compound cream today. Lumbar MRI and XR were ordered at last visit in November 2021 and patient has not completed. Again discussed her options for treatment such as injections and PT. She states PT makes her pain worse. She states she does not want knee or lumbar injections. Back Pain  This is a chronic problem. The current episode started more than 1 year ago. The problem occurs constantly. The problem is unchanged. The pain is present in the lumbar spine. The quality of the pain is described as stabbing. The pain radiates to the right thigh and left thigh. The pain is at a severity of 8/10. The pain is moderate. The pain is The same all the time. The symptoms are aggravated by position, twisting, bending, lying down, sitting, standing, stress and coughing. Associated symptoms include numbness. Pertinent negatives include no bladder incontinence, bowel incontinence, chest pain, fever or tingling. She has tried home exercises, bed rest, walking, heat, ice, muscle relaxant and NSAIDs for the symptoms. The treatment provided no relief. Knee Pain   The incident occurred more than 1 week ago. Incident location: Parking lot at grocery store. The injury mechanism was a fall.  The pain is present in the right knee. The quality of the pain is described as aching. The pain is at a severity of 4/10. The pain is mild. The pain has been Intermittent since onset. Associated symptoms include numbness. Pertinent negatives include no inability to bear weight, loss of motion, loss of sensation, muscle weakness or tingling. She reports no foreign bodies present. The symptoms are aggravated by movement, palpation and weight bearing. She has tried acetaminophen, heat, elevation, ice, immobilization, NSAIDs, rest and non-weight bearing for the symptoms. The treatment provided mild relief. Patient denies any new neurological symptoms. No bowel or bladder incontinence, no weakness, and no falling.     Past Medical History:   Diagnosis Date    Anxiety     Arthritis     Bipolar disorder ([facility] [address]     Depression     Disc disease, degenerative, lumbar or lumbosacral     Disc disease, degenerative, lumbar or lumbosacral     Fibromyalgia     GERD (gastroesophageal reflux disease)     IBS (irritable bowel syndrome)     Insulin resistance 2020    Migraines        Past Surgical History:   Procedure Laterality Date     SECTION  2005     SECTION  2008    CHOLECYSTECTOMY  2000    KNEE SURGERY Left 2012    SHOULDER SURGERY Right 2017    tendon tear    TOOTH EXTRACTION      WISDOM TOOTH EXTRACTION         Allergies   Allergen Reactions    Azithromycin Anaphylaxis    Bee Venom Anaphylaxis    Penicillins Anaphylaxis and Hives    Propoxyphene Anaphylaxis    Bupropion Hives    Cephalexin     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     Levofloxacin Hives    Motrin [Ibuprofen] Nausea Only    Paroxetine Hcl Hives    Rofecoxib     Zoloft [Sertraline Hcl]          Current Outpatient Medications:     ONETOUCH VERIO strip, , Disp: , Rfl:     SUMAtriptan (IMITREX) 100 MG tablet, sumatriptan 100 mg tablet  TAKE 1 TABLET BY MOUTH ONCE AT ONSET, THEN REPEAT IN 1 HOUR FOR 1 DOSE IF STILL ONGOING., Disp: 9 tablet, Rfl: 5    EPINEPHrine (EPIPEN 2-NARCISO) 0.3 MG/0.3ML SOAJ injection, Inject 0.3 mLs into the muscle once for 1 dose Use as directed for allergic reaction, Disp: 2 each, Rfl: 0    dapagliflozin (FARXIGA) 5 MG tablet, Take 1 tablet by mouth every morning, Disp: 30 tablet, Rfl: 0    Flibanserin 100 MG TABS, Take 100 mg by mouth daily, Disp: 30 tablet, Rfl: 0    diclofenac sodium (VOLTAREN) 1 % GEL, Apply 2 g topically 2 times daily, Disp: 50 g, Rfl: 0    metFORMIN (GLUCOPHAGE) 500 MG tablet, Take 1 tablet by mouth 2 times daily (with meals), Disp: 180 tablet, Rfl: 1    omeprazole (PRILOSEC) 20 MG delayed release capsule, TAKE 1 CAPSULE BY MOUTH ONCE DAILY IN THE MORNING BEFORE BREAKFAST, Disp: 90 capsule, Rfl: 1    cariprazine hcl (VRAYLAR) 1.5 MG capsule, Take 1 capsule by mouth daily, Disp: 2 capsule, Rfl: 0    Naproxen Sodium (ALEVE PO), Take by mouth, Disp: , Rfl:     meloxicam (MOBIC) 15 MG tablet, TAKE 1 TABLET BY MOUTH ONCE DAILY AS NEEDED FOR PAIN, Disp: 30 tablet, Rfl: 1    lidocaine-prilocaine (EMLA) 2.5-2.5 % cream, , Disp: , Rfl:     Menthol, Topical Analgesic, (BIOFREEZE EX), Apply topically , Disp: , Rfl:     Family History   Problem Relation Age of Onset    Diabetes Maternal Grandmother     Cervical Cancer Maternal Grandmother     Cancer Maternal Grandmother         melanoma    Diabetes Mother     Depression Mother     No Known Problems Father     Diabetes Brother     Cancer Maternal Grandfather     No Known Problems Paternal Grandmother     No Known Problems Paternal Grandfather     Breast Cancer Neg Hx     Colon Cancer Neg Hx     Uterine Cancer Neg Hx     Stroke Neg Hx     Heart Attack Neg Hx        Social History     Socioeconomic History    Marital status:      Spouse name: Not on file    Number of children: Not on file    Years of education: Not on file    Highest education level: Not on file   Occupational History    Not on file   Tobacco Use    Smoking status: Never    Smokeless tobacco: Never   Vaping Use    Vaping Use: Never used   Substance and Sexual Activity    Alcohol use: Not Currently    Drug use: Never    Sexual activity: Yes     Partners: Male   Other Topics Concern    Not on file   Social History Narrative    Not on file     Social Determinants of Health     Financial Resource Strain: Not on file   Food Insecurity: Not on file   Transportation Needs: Not on file   Physical Activity: Not on file   Stress: Not on file   Social Connections: Not on file   Intimate Partner Violence: Not on file   Housing Stability: Not on file       Review of Systems:  Review of Systems   Constitutional: Negative for chills and fever. Cardiovascular:  Negative for chest pain and palpitations. Respiratory:  Negative for cough and shortness of breath. Musculoskeletal:  Positive for back pain and joint pain. Gastrointestinal:  Negative for bowel incontinence and constipation. Genitourinary:  Negative for bladder incontinence. Neurological:  Positive for numbness. Negative for disturbances in coordination, loss of balance and tingling. Physical Exam:  Ht 5' 7\" (1.702 m)   Wt (!) 378 lb 9.6 oz (171.7 kg)   LMP 09/05/2022   BMI 59.30 kg/m²     Physical Exam  Constitutional:       Appearance: She is obese. HENT:      Head: Normocephalic. Pulmonary:      Effort: Pulmonary effort is normal.   Musculoskeletal:         General: Normal range of motion. Cervical back: Normal range of motion. Lumbar back: Tenderness present. Right knee: Tenderness present. Left knee: Tenderness present. Skin:     General: Skin is warm and dry. Neurological:      Mental Status: She is alert and oriented to person, place, and time.        Record/Diagnostics Review:    As reviewed in PMH    Assessment:  Problem List Items Addressed This Visit       Pars defect of lumbar spine (Chronic)    Chronic low back pain - Primary    Relevant Medications    tiZANidine (ZANAFLEX) 4 MG tablet    Other Relevant Orders    MRI LUMBAR SPINE WO CONTRAST          Treatment Plan:  Discussed different treatment options including continued conservative care such as physical therapy, chiropractic care, acupuncture. States PT makes her pain worse. Discussed different interventional options such as epidural steroids or medial branch blocks, joint injections. She declines  Compound cream ordered  Refill Zanaflex 4 mg HS   Pt requesting Norco - states her previous pain provider prescribed this and it managed her pain well  Discussed with patient that opiates are not part of her plan of care at this time. Will need updated imaging to evaluate pathology and guide our treatment plan. MRI lumbar to assess pathology    I have reviewed the chief complaint and history of present illness (including ROS and PFSH) and vital documentation by my staff and I agree with their documentation and have added where applicable.

## 2022-12-20 ENCOUNTER — HOSPITAL ENCOUNTER (EMERGENCY)
Age: 37
Discharge: HOME | End: 2022-12-20
Attending: EMERGENCY MEDICINE
Payer: COMMERCIAL

## 2022-12-20 VITALS
WEIGHT: 293 LBS | HEIGHT: 67 IN | HEART RATE: 94 BPM | OXYGEN SATURATION: 94 % | RESPIRATION RATE: 16 BRPM | BODY MASS INDEX: 45.99 KG/M2 | SYSTOLIC BLOOD PRESSURE: 143 MMHG | DIASTOLIC BLOOD PRESSURE: 103 MMHG

## 2022-12-20 DIAGNOSIS — B96.89 BACTERIAL VAGINOSIS: Primary | ICD-10-CM

## 2022-12-20 DIAGNOSIS — N76.0 BACTERIAL VAGINOSIS: Primary | ICD-10-CM

## 2022-12-20 LAB
BACTERIA: ABNORMAL
BILIRUBIN URINE: NEGATIVE
CANDIDA SPECIES, DNA PROBE: NEGATIVE
COLOR: YELLOW
EPITHELIAL CELLS UA: ABNORMAL /HPF (ref 0–5)
GARDNERELLA VAGINALIS, DNA PROBE: POSITIVE
GLUCOSE URINE: ABNORMAL
HCG(URINE) PREGNANCY TEST: NEGATIVE
KETONES, URINE: NEGATIVE
LEUKOCYTE ESTERASE, URINE: NEGATIVE
NITRITE, URINE: NEGATIVE
OTHER OBSERVATIONS UA: ABNORMAL
PH UA: 6 (ref 5–8)
PROTEIN UA: NEGATIVE
RBC UA: ABNORMAL /HPF (ref 0–2)
SOURCE: ABNORMAL
SPECIFIC GRAVITY UA: 1.01 (ref 1–1.03)
TRICHOMONAS VAGINALIS DNA: NEGATIVE
TURBIDITY: ABNORMAL
URINE HGB: ABNORMAL
UROBILINOGEN, URINE: NORMAL
WBC UA: ABNORMAL /HPF (ref 0–5)

## 2022-12-20 PROCEDURE — 99283 EMERGENCY DEPT VISIT LOW MDM: CPT

## 2022-12-20 PROCEDURE — 87660 TRICHOMONAS VAGIN DIR PROBE: CPT

## 2022-12-20 PROCEDURE — 81025 URINE PREGNANCY TEST: CPT

## 2022-12-20 PROCEDURE — 81001 URINALYSIS AUTO W/SCOPE: CPT

## 2022-12-20 PROCEDURE — 87086 URINE CULTURE/COLONY COUNT: CPT

## 2022-12-20 PROCEDURE — 87510 GARDNER VAG DNA DIR PROBE: CPT

## 2022-12-20 PROCEDURE — 86403 PARTICLE AGGLUT ANTBDY SCRN: CPT

## 2022-12-20 PROCEDURE — 87491 CHLMYD TRACH DNA AMP PROBE: CPT

## 2022-12-20 PROCEDURE — 87591 N.GONORRHOEAE DNA AMP PROB: CPT

## 2022-12-20 PROCEDURE — 87480 CANDIDA DNA DIR PROBE: CPT

## 2022-12-20 RX ORDER — METRONIDAZOLE 500 MG/1
500 TABLET ORAL 2 TIMES DAILY
Qty: 14 TABLET | Refills: 0 | Status: SHIPPED | OUTPATIENT
Start: 2022-12-20 | End: 2022-12-27

## 2022-12-20 RX ORDER — METRONIDAZOLE 500 MG/1
500 TABLET ORAL 2 TIMES DAILY
Qty: 14 TABLET | Refills: 0 | Status: SHIPPED | OUTPATIENT
Start: 2022-12-20 | End: 2022-12-20 | Stop reason: SDUPTHER

## 2022-12-20 ASSESSMENT — ENCOUNTER SYMPTOMS
GASTROINTESTINAL NEGATIVE: 1
EYES NEGATIVE: 1
RESPIRATORY NEGATIVE: 1

## 2022-12-20 NOTE — ED PROVIDER NOTES
Emergency Department         I reviewed the mid level provider's note and agree with the documented findings and we have discussed the plan of care. I have reviewed the emergency nurses triage note. I agree with the chief complaint, past medical history, past surgical history, allergies, medications, social and family history as documented unless otherwise noted below.       Marina Appiah,   12/20/22 [id number]

## 2022-12-20 NOTE — ED PROVIDER NOTES
[facility]  [address] [facility] [address] [facility] [address]  Phone: [phone]  Fax: [phone]        Pt Name: Tl Godwin  MRN: [id number]  [place] 1985  Date of evaluation: 22    [address] [place] [address]       Chief Complaint   Patient presents with    Pelvic Pain     Pt reports  having pelvic pain for a few days. Yesterday noted clear, sticky drainage and now feeling abnormal mass in pelvic region. Pt has history of uterine fibroid. Denies abdominal pain. HISTORY OF PRESENT ILLNESS (Location/Symptom, Timing/Onset, Context/Setting, Quality, Duration, Modifying Factors, Severity)      Tl Godwin is a 40 y.o. female with no pertinent PMH who presents to the ED via private auto with complaints of some pelvic pain and vaginal pain as well as some vaginal discharge which started yesterday. She states it feels like there is is some swelling in her vaginal region. She complains of some itching and overall irritation. She states she does have a history of uterine fibroids. She denies any burning with urination or blood in her urine. She states that her and her  have been trying to have a baby. Denies any abdominal or back pain that is new at this time. Denies any fever chills or body aches. Denies any chest pain or shortness of breath. Denies any history of kidney stones. PAST MEDICAL / SURGICAL / SOCIAL / FAMILY HISTORY     PMH:  has a past medical history of Anxiety, Arthritis, Bipolar disorder ([address] Depression, Disc disease, degenerative, lumbar or lumbosacral, Disc disease, degenerative, lumbar or lumbosacral, Fibromyalgia, GERD (gastroesophageal reflux disease), IBS (irritable bowel syndrome), Insulin resistance, and Migraines. Surgical History:  has a past surgical history that includes  section (2005);  section (2008); Cholecystectomy ();  [place] tooth extraction; shoulder surgery (Right, 2017); knee surgery (Left, 2012); and Tooth Extraction. Social History:  reports that she has never smoked. She has never used smokeless tobacco. She reports that she does not currently use alcohol. She reports that she does not use drugs. Family History: She indicated that her mother is alive. She indicated that her father is alive. She indicated that her brother is alive. She indicated that her maternal grandmother is alive. She indicated that her maternal grandfather is . She indicated that the status of her paternal grandmother is unknown. She indicated that the status of her paternal grandfather is unknown. She indicated that the status of her neg hx is unknown.   family history includes Cancer in her maternal grandfather and maternal grandmother; Cervical Cancer in her maternal grandmother; Depression in her mother; Diabetes in her brother, maternal grandmother, and mother; No Known Problems in her father, paternal grandfather, and paternal grandmother. Psychiatric History: None    Allergies: Azithromycin, Bee venom, Penicillins, Propoxyphene, Bupropion, Cephalexin, Gabapentin, Levofloxacin, Motrin [ibuprofen], Paroxetine hcl, Rofecoxib, and Zoloft [sertraline hcl]    Home Medications:   Prior to Admission medications    Medication Sig Start Date End Date Taking?  Authorizing Provider   metroNIDAZOLE (FLAGYL) 500 MG tablet Take 1 tablet by mouth 2 times daily for 7 days 22 Yes CLARY Blevins NP   tiZANidine (ZANAFLEX) 4 MG tablet TAKE 1 TABLET BY MOUTH NIGHTLY AS NEEDED FOR MUSCLE SPASM 22   CLARY Sibley - CNP   ONETOUCH VERIO strip  8/10/21   Historical Provider, MD   SUMAtriptan (IMITREX) 100 MG tablet sumatriptan 100 mg tablet  TAKE 1 TABLET BY MOUTH ONCE AT ONSET, THEN REPEAT IN 1 HOUR FOR 1 DOSE IF STILL ONGOING. 21   Cate Merrill MD   EPINEPHrine (EPIPEN 2-NARCISO) 0.3 MG/0.3ML SOAJ injection Inject 0.3 mLs into the muscle once for 1 dose Use as directed for allergic reaction 8/5/21 8/5/21  Jade Page MD   diclofenac sodium (VOLTAREN) 1 % GEL Apply 2 g topically 2 times daily 5/5/21   Jade Page MD   Naproxen Sodium (ALEVE PO) Take by mouth    Historical Provider, MD       REVIEW OF SYSTEMS  (2-9 systems for level 4, 10 ormore for level 5)      Review of Systems   Constitutional: Negative. HENT: Negative. Eyes: Negative. Respiratory: Negative. Cardiovascular: Negative. Gastrointestinal: Negative. Endocrine: Negative. Genitourinary:  Positive for pelvic pain, vaginal discharge and vaginal pain. Negative for decreased urine volume, difficulty urinating, dyspareunia, dysuria, enuresis, flank pain, frequency, genital sores, hematuria, menstrual problem, urgency and vaginal bleeding. Musculoskeletal: Negative. Skin: Negative. Neurological: Negative. Hematological: Negative. Psychiatric/Behavioral: Negative. All other systems negative except as marked. PHYSICAL EXAM  (up to 7 for level 4, 8 or more for level 5)      INITIAL VITALS:  height is 5' 7\" (1.702 m) and weight is 167.8 kg (370 lb) (abnormal). Her blood pressure is 143/103 (abnormal) and her pulse is 94. Her respiration is 16 and oxygen saturation is 94%. Vital signs reviewed. Physical Exam  Constitutional:       Appearance: Normal appearance. HENT:      Head: Normocephalic. Right Ear: External ear normal.      Left Ear: External ear normal.      Nose: Nose normal.      Mouth/Throat:      Mouth: Mucous membranes are moist.      Pharynx: Oropharynx is clear. Eyes:      Extraocular Movements: Extraocular movements intact. Conjunctiva/sclera: Conjunctivae normal.      Pupils: Pupils are equal, round, and reactive to light. Cardiovascular:      Rate and Rhythm: Normal rate and regular rhythm. Pulses: Normal pulses. Heart sounds: Normal heart sounds.    Pulmonary:      Effort: Pulmonary effort is normal.      Breath sounds: Normal breath sounds. Abdominal:      General: Bowel sounds are normal. There is no distension. Palpations: Abdomen is soft. Tenderness: There is no abdominal tenderness. There is no right CVA tenderness, left CVA tenderness or guarding. Genitourinary:     Vagina: Vaginal discharge present. Comments: Exam performed with RN at bedside, significant amount of irritation noted around the vaginal opening and the vulva; no wounds lacerations or signs of trauma noted; external canals within normal limits, with the exception of some thin white vaginal discharge; cervix within normal limits; there is a very large skin tag noted to the right inner thigh, which patient states she has had for quite some time; no adnexal tenderness bilaterally during the bimanual exam  Musculoskeletal:         General: Normal range of motion. Cervical back: Normal range of motion. Skin:     General: Skin is warm and dry. Capillary Refill: Capillary refill takes less than 2 seconds. Neurological:      Mental Status: She is alert and oriented to person, place, and time. Psychiatric:         Mood and Affect: Mood normal.         Behavior: Behavior normal.         Thought Content: Thought content normal.         Judgment: Judgment normal.         DIFFERENTIAL DIAGNOSIS / MDM     Upon exam, patient is resting in her room with her daughter at bedside. Heart sounds within normal limits upon auscultation. Lung sounds are clear and equal bilaterally. Bowel sounds are present in all 4 quadrants. No abdominal distention tenderness or guarding is noted. She has no pelvic pain with palpation. During the pelvic exam, which was performed with the RN at bedside, there is significant amount of irritation noted around the vaginal opening and the vulva. In the vaginal canal, there is some thin white discharge, without odor. Cervix is within normal limits.   There is a very large skin tag noted to the right inner thigh, which patient states she has had for quite some time. No adnexal tenderness bilaterally during the bimanual exam.  Patient tolerated this well. UA is showing some bacteria in her urine. As stated above, she denies any dysuria or hematuria. She is positive for bacterial vaginosis. I will treat her with Flagyl. Educated her to ensure she is taking the medication as prescribed. She is welcome to continue ibuprofen and Tylenol as needed for pain control. She should follow-up closely with a primary care physician as well as OB/GYN. Patient states she has not seen her OB/GYN for about 2 or 3 years, and is looking for a new physician as she would like to see a female. I will provide her with some contact information. Return to the emergency department with any intractable nausea and vomiting, chest pain or shortness of breath, headache dizziness lightheadedness, or any other new concerning worsening symptoms. Patient states understanding of education and is stable for outpatient follow-up at this time. PLAN (LABS / IMAGING / EKG):  Orders Placed This Encounter   Procedures    Vaginitis DNA Probe    C.trachomatis N.gonorrhoeae DNA (Cervical or Vaginal Swab)    Culture, Urine    Urinalysis with Reflex to Culture    PREGNANCY, URINE    Microscopic Urinalysis       MEDICATIONS ORDERED:  Orders Placed This Encounter   Medications    DISCONTD: metroNIDAZOLE (FLAGYL) 500 MG tablet     Sig: Take 1 tablet by mouth 2 times daily for 7 days     Dispense:  14 tablet     Refill:  0    metroNIDAZOLE (FLAGYL) 500 MG tablet     Sig: Take 1 tablet by mouth 2 times daily for 7 days     Dispense:  14 tablet     Refill:  0       Controlled Substances Monitoring:     DIAGNOSTIC RESULTS     EKG: All EKG's are interpreted by the Emergency Department Physician who either signs or Co-signs this chart in the absenceof a cardiologist.      RADIOLOGY: All images are read by the radiologist and their interpretations are reviewed.     No orders to display       No results found. LABS:  Results for orders placed or performed during the hospital encounter of 12/20/22   Vaginitis DNA Probe    Specimen: Vaginal   Result Value Ref Range    Source . VAGINAL SWAB     Trichomonas Vaginalis DNA NEGATIVE NEGATIVE    Gardnerella Vaginalis, DNA Probe POSITIVE (A) NEGATIVE    Candida Species, DNA Probe NEGATIVE NEGATIVE   Urinalysis with Reflex to Culture    Specimen: Urine, clean catch   Result Value Ref Range    Color, UA Yellow Yellow    Turbidity UA SLIGHTLY CLOUDY (A) Clear    Glucose, Ur 3+ (A) NEGATIVE    Bilirubin Urine NEGATIVE NEGATIVE    Ketones, Urine NEGATIVE NEGATIVE    Specific Gravity, UA 1.010 1.005 - 1.030    Urine Hgb LARGE (A) NEGATIVE    pH, UA 6.0 5.0 - 8.0    Protein, UA NEGATIVE NEGATIVE    Urobilinogen, Urine Normal Normal    Nitrite, Urine NEGATIVE NEGATIVE    Leukocyte Esterase, Urine NEGATIVE NEGATIVE   PREGNANCY, URINE   Result Value Ref Range    HCG(Urine) Pregnancy Test NEGATIVE NEGATIVE   Microscopic Urinalysis   Result Value Ref Range    WBC, UA 20 TO 50 0 - 5 /HPF    RBC, UA 10 TO 20 0 - 2 /HPF    Epithelial Cells UA 20 TO 50 0 - 5 /HPF    Bacteria, UA FEW (A) None    Other Observations UA Culture ordered based on defined criteria. (A) NOT REQ. EMERGENCY DEPARTMENT COURSE           Vitals:    Vitals:    12/20/22 1548 12/20/22 1755   BP: (!) 133/94 (!) 143/103   Pulse: (!) 107 94   Resp: 18 16   SpO2: 97% 94%   Weight: (!) 167.8 kg (370 lb)    Height: 5' 7\" (1.702 m)      -------------------------  BP: (!) 143/103,  , Heart Rate: 94, Resp: 16      RE-EVALUATION:  See ED Course notes above. CONSULTS:  None    PROCEDURES:  None    FINAL IMPRESSION      1. Bacterial vaginosis          DISPOSITION / PLAN     CONDITION ON DISPOSITION:   Good / Stable for discharge. PATIENT REFERRED TO:  [facility] Emergency Department  [address]   [address] [facility] [address] [phone]  Go to   If symptoms CLARY Downing - CNP  [address] [facility] [address] [phone]    Schedule an appointment as soon as possible for a visit       PCP  [address] Same Day  Call   As needed    DISCHARGE MEDICATIONS:  Discharge Medication List as of 12/20/2022  5:29 PM          CLARY Patel - NP   Emergency Medicine Nurse Practitioner    (Please note that portions of this note were completed with a voice recognition program.  Efforts were made to edit the dictations but occasionally words aremis-transcribed.)      CLARY Patel NP  12/20/22 [phone]

## 2022-12-21 LAB
C TRACH DNA GENITAL QL NAA+PROBE: NEGATIVE
CULTURE: ABNORMAL
N. GONORRHOEAE DNA: NEGATIVE
SPECIMEN DESCRIPTION: ABNORMAL
SPECIMEN DESCRIPTION: NORMAL

## 2023-02-09 ENCOUNTER — TELEPHONE (OUTPATIENT)
Dept: OBGYN CLINIC | Age: 38
End: 2023-02-09

## 2023-02-09 ENCOUNTER — OFFICE VISIT (OUTPATIENT)
Dept: OBGYN CLINIC | Age: 38
End: 2023-02-09

## 2023-02-09 VITALS
BODY MASS INDEX: 45.99 KG/M2 | SYSTOLIC BLOOD PRESSURE: 132 MMHG | WEIGHT: 293 LBS | DIASTOLIC BLOOD PRESSURE: 84 MMHG | HEIGHT: 67 IN

## 2023-02-09 DIAGNOSIS — N76.0 BV (BACTERIAL VAGINOSIS): ICD-10-CM

## 2023-02-09 DIAGNOSIS — L68.0 HIRSUTISM: ICD-10-CM

## 2023-02-09 DIAGNOSIS — B96.89 BV (BACTERIAL VAGINOSIS): ICD-10-CM

## 2023-02-09 DIAGNOSIS — R93.89 ENDOMETRIAL THICKENING ON ULTRASOUND: ICD-10-CM

## 2023-02-09 DIAGNOSIS — R31.9 URINARY TRACT INFECTION WITH HEMATURIA, SITE UNSPECIFIED: ICD-10-CM

## 2023-02-09 DIAGNOSIS — Z91.199 NONCOMPLIANCE WITH DIABETES TREATMENT: ICD-10-CM

## 2023-02-09 DIAGNOSIS — E66.01 CLASS 3 SEVERE OBESITY WITH SERIOUS COMORBIDITY AND BODY MASS INDEX (BMI) OF 50.0 TO 59.9 IN ADULT, UNSPECIFIED OBESITY TYPE (HCC): ICD-10-CM

## 2023-02-09 DIAGNOSIS — Z76.89 ENCOUNTER TO ESTABLISH CARE WITH NEW DOCTOR: ICD-10-CM

## 2023-02-09 DIAGNOSIS — N89.8 VAGINAL ITCHING: ICD-10-CM

## 2023-02-09 DIAGNOSIS — N92.1 MENORRHAGIA WITH IRREGULAR CYCLE: Primary | ICD-10-CM

## 2023-02-09 DIAGNOSIS — N39.0 URINARY TRACT INFECTION WITH HEMATURIA, SITE UNSPECIFIED: ICD-10-CM

## 2023-02-09 RX ORDER — NITROFURANTOIN 25; 75 MG/1; MG/1
100 CAPSULE ORAL 2 TIMES DAILY
Qty: 14 CAPSULE | Refills: 0 | Status: SHIPPED | OUTPATIENT
Start: 2023-02-09 | End: 2023-02-16

## 2023-02-09 RX ORDER — GREEN TEA/HOODIA GORDONII 315-12.5MG
1 CAPSULE ORAL DAILY
Qty: 30 TABLET | Refills: 11 | Status: SHIPPED | OUTPATIENT
Start: 2023-02-09 | End: 2023-03-11

## 2023-02-09 RX ORDER — FLUCONAZOLE 150 MG/1
150 TABLET ORAL ONCE
Qty: 1 TABLET | Refills: 0 | Status: SHIPPED | OUTPATIENT
Start: 2023-02-09 | End: 2023-02-09

## 2023-02-09 RX ORDER — METRONIDAZOLE 7.5 MG/G
1 GEL VAGINAL DAILY
Qty: 70 G | Refills: 0 | Status: SHIPPED | OUTPATIENT
Start: 2023-02-09 | End: 2023-02-16

## 2023-02-09 NOTE — PROGRESS NOTES
Subjective:  Adelaide is in to establish care and discuss irregular periods and history thickened endometrium and treatments. Adelaide states has had irregular periods for years had evaluation at prior gynecologist office in 2020 and had pelvic US that revealed thickened endometrium of of 3.26cm. It was recommended to have Endometrial sampling with hysteroscopy and probable Myosure resection of polyp/fibroid she had it scheduled but then never went through it and has not had any gynecological care since besides went to ER in December 2022 for discharge and vaginal pressure and found to have BV and GBS UTI she was prescribed flagyl but could not tolerate it states made her nauseated so never finished it she has PCN allergies and never took anything for GBS UTI. She admits to still noting vaginal discharge and itching since then. In regards to menstrual cycles she states can be monthly or can bleed almost whole month sometimes. Sometimes will be heavy with clots sometimes if whole month will vary in intensity throughout month of heaviness. She states can bleed through tampons/pads and affect ADLS. She also admits to pelvic pain/cramping with periods. She states had Mirena IUD after last pregnancy 2008/2009 and states \" embedded into uterus\" she states she has also been on OCP in past prior to that no hormonal contraception since IUD.  has not had vasectomy, she has not had TL. She states they would actually like to get pregnant, but doesn't think she can. She admits to fatigue denies dizziness, cp, shortness of breath or syncope. Positive hirsutism  She is morbidly obese and diabetic and has not had care in over year for diabetes last HGB a1c was in August 2021 and was 7.8. She is not currently taking any medications for her diabetes. She denies new medications/supplements, significant stressors,  hair loss, palpitations, hirsutism, galactorrhea,  easy bruising/bleeding.      Menarche age [age]  She has had 2 live births. LAst PAP: 2020 WNL, states thinks may have had abnormal in past but never had colposcopy. Family hx:   Breast cancer: Her mother has recently been diagnosed with breast cancer. Colon cancer: Father  Uterine or ovarian cancer: states possibly someone on maternal side had ovarian cancer but unsure. Review of systems per HPI, otherwise negative. Allergies; medications; past medical, surgical, family, and social history; and problem list reviewed as indicated in this encounter. Objective:  Vitals:  Blood pressure 132/84, height 5' 7\" (1.702 m), weight (!) 370 lb (167.8 kg), not currently breastfeeding. Constitutional: She is oriented to person, place, and time. She appears well-developed and well-nourished and in no acute distress. HEENT: normocephalic, external ears normal, Pupils equal round and reactive bilaterally  Cardiovascular: Normal rate and regular rhythm, no murmur, rub, or gallop    Pulmonary/Chest: Effort normal and breath sounds normal. No rales or wheezes. No chest retraction. Abdomen: soft, non tender  Extremities: no clubbing, cyanosis, or edema  Neurological:  CN II - XII grossly intact; no focal neurological deficits  Psychiatric:  Well groomed, well dressed. The patient maintains appropriate eye contact and does not appear to be responding to internal stimuli. No agitation    Assessment/ Plan / Medical Decision Making   Diagnosis Orders   1. Menorrhagia with irregular cycle  CBC with Auto Differential    TSH with Reflex    Protime-INR    APTT    HCG, Quantitative, Pregnancy    Prolactin    Follicle Stimulating Hormone    Hemoglobin A1C    Insulin, total    Basic Metabolic Panel    Testosterone      2. Endometrial thickening on ultrasound  US PELVIS COMPLETE    US NON OB TRANSVAGINAL      3.  Type 2 diabetes mellitus without complication, unspecified whether long term insulin use ([facility] [address]  [employer] [place], [address] [facility] [place]      4. Class 3 severe obesity with serious comorbidity and body mass index (BMI) of 50.0 to 59.9 in adult, unspecified obesity type ([address]        5. Urinary tract infection with hematuria, site unspecified  nitrofurantoin, macrocrystal-monohydrate, (MACROBID) 100 MG capsule    Urinalysis    Culture, Urine      6. BV (bacterial vaginosis)        7. Encounter to establish care with new doctor        8. Hirsutism  Testosterone              Medications, laboratory testing, imaging, consultation, and follow up as documented in this encounter. Heavy irregualr periods/hx thickened endometrium noted on pelvic US in 2020 as noted above:   Pelvic exam/cultures: declined at this time states will return for PAP and cultures     Will check labs and pelvic ultrasound,  Discussed in detail need for endometrial sampling based on prior history thickened endometrium, HMB, she is also not compliant with diabetes and morbid obesity, discussed need for endometrial sampling to r/o hyperplasia/malignancy. Will need to check US and labs though and then likely consult with Dr. Oscar Schrader to again discuss Hscope, D&C, myosure. Needs PAP also d/t abnormal bleeding. Management possible management briefly with pt of irregular bleeding /painful periods and Pros and cons of medical management with hormonal contraception options vs surgical management with endometrial ablation or hysterectomy will be discussed further after labs, pelvic US. She mentioned wanting pregnancy discussed need for diabetic and obesity management is high priority, uncontrolled risks. Referral to PCP    Vaginal discharge and itching- decline pelvic today will treat BV as never took flagyl as prescribed was unable to tolerate and trial of macrobid for GBS UTI given allergies and then need recheck urinalysis after completing antibiotic to make sure resolved infection and hematuria.  Script diflucan to take after completing antibiotics and probiotic script recommended. Will check cultures with PAP. Monitor severe pain especially ones sided, heavy bleeding >1 tampon/pad hr, fevers/chills notify us, go to ER. Follow up after US and labs for PAP    Brenda Ramsay received counseling on the following healthy behaviors: nutrition, exercise, and medication adherence    Patient given educational materials on AUB, EMB    Was a self-tracking handout given in paper form or via MyChart? No  If yes, see orders or list here. Discussed use, benefit, and side effects of prescribed medications. Barriers to medication compliance addressed. All patient questions answered. Pt voiced understanding. This note is created with the assistance of a speech-recognition program.  While intending to generate a document that actually reflects the content of the visit, no guarantees can be provided that every mistake has been identified and corrected by editing. Of the 45 minute duration appointment visit, [facility] spent at least 50% of the face-to-face time in counseling, explanation of diagnosis, planning of further management, and answering all questions.

## 2023-02-09 NOTE — TELEPHONE ENCOUNTER
Attempted to contact pt to let her know the provider called her in some medications. Macrobid for gbs uti (PCN allergy) and then diflucan to take after that and then metrogel until completed.  LM to call the office

## 2023-02-15 ENCOUNTER — TELEPHONE (OUTPATIENT)
Dept: PRIMARY CARE CLINIC | Age: 38
End: 2023-02-15

## 2023-02-23 ENCOUNTER — HOSPITAL ENCOUNTER (OUTPATIENT)
Age: 38
Setting detail: SPECIMEN
Discharge: HOME | End: 2023-02-23

## 2023-02-23 DIAGNOSIS — N92.1 MENORRHAGIA WITH IRREGULAR CYCLE: ICD-10-CM

## 2023-02-23 DIAGNOSIS — Z91.199 NONCOMPLIANCE WITH DIABETES TREATMENT: ICD-10-CM

## 2023-02-23 LAB
ABSOLUTE EOS #: 0.18 K/UL (ref 0–0.44)
ABSOLUTE IMMATURE GRANULOCYTE: 0.03 K/UL (ref 0–0.3)
ABSOLUTE LYMPH #: 3.07 K/UL (ref 1.1–3.7)
ABSOLUTE MONO #: 0.65 K/UL (ref 0.1–1.2)
ANION GAP SERPL CALCULATED.3IONS-SCNC: 15 MMOL/L (ref 9–17)
BASOPHILS # BLD: 1 % (ref 0–2)
BASOPHILS ABSOLUTE: 0.08 K/UL (ref 0–0.2)
BUN SERPL-MCNC: 10 MG/DL (ref 6–20)
CALCIUM SERPL-MCNC: 9.2 MG/DL (ref 8.6–10.4)
CHLORIDE SERPL-SCNC: 101 MMOL/L (ref 98–107)
CO2 SERPL-SCNC: 24 MMOL/L (ref 20–31)
CREAT SERPL-MCNC: 0.59 MG/DL (ref 0.5–0.9)
EOSINOPHILS RELATIVE PERCENT: 2 % (ref 1–4)
EST. AVERAGE GLUCOSE BLD GHB EST-MCNC: 252 MG/DL
FOLLICLE STIMULATING HORMONE: 6.6 MIU/ML (ref 1.7–21.5)
GFR SERPL CREATININE-BSD FRML MDRD: >60 ML/MIN/1.73M2
GLUCOSE SERPL-MCNC: 271 MG/DL (ref 70–99)
HBA1C MFR BLD: 10.4 % (ref 4–6)
HCG QUANTITATIVE: <1 MIU/ML
HCT VFR BLD AUTO: 45.7 % (ref 36.3–47.1)
HGB BLD-MCNC: 14.3 G/DL (ref 11.9–15.1)
IMMATURE GRANULOCYTES: 0 %
INSULIN COMMENT: NORMAL
INSULIN REFERENCE RANGE:: NORMAL
INSULIN: 31.9 MU/L
LYMPHOCYTES # BLD: 29 % (ref 24–43)
MCH RBC QN AUTO: 26.6 PG (ref 25.2–33.5)
MCHC RBC AUTO-ENTMCNC: 31.3 G/DL (ref 28.4–34.8)
MCV RBC AUTO: 84.9 FL (ref 82.6–102.9)
MONOCYTES # BLD: 6 % (ref 3–12)
NRBC AUTOMATED: 0 PER 100 WBC
PDW BLD-RTO: 14.4 % (ref 11.8–14.4)
PLATELET # BLD AUTO: 280 K/UL (ref 138–453)
PMV BLD AUTO: 9.5 FL (ref 8.1–13.5)
POTASSIUM SERPL-SCNC: 4.3 MMOL/L (ref 3.7–5.3)
PROLACTIN: 5.34 NG/ML (ref 4.79–23.3)
RBC # BLD: 5.38 M/UL (ref 3.95–5.11)
SEG NEUTROPHILS: 62 % (ref 36–65)
SEGMENTED NEUTROPHILS ABSOLUTE COUNT: 6.71 K/UL (ref 1.5–8.1)
SODIUM SERPL-SCNC: 140 MMOL/L (ref 135–144)
TSH SERPL-ACNC: 1.12 UIU/ML (ref 0.3–5)
WBC # BLD AUTO: 10.7 K/UL (ref 3.5–11.3)

## 2023-03-03 ENCOUNTER — TELEPHONE (OUTPATIENT)
Dept: OBGYN CLINIC | Age: 38
End: 2023-03-03

## 2023-03-03 NOTE — TELEPHONE ENCOUNTER
----- Message from [facility] CLARY OVERTON - CNP sent at 3/2/2023  5:30 PM EST -----  Her HGB A1C is elevated and insulin elevated she needs to establish with PCP for uncontrolled diabetes I referred her and it looks like they called her but she has yet to schedule.  Rest of labs are normal

## 2023-03-06 ENCOUNTER — OFFICE VISIT (OUTPATIENT)
Dept: PRIMARY CARE CLINIC | Age: 38
End: 2023-03-06
Payer: COMMERCIAL

## 2023-03-06 VITALS
BODY MASS INDEX: 45.99 KG/M2 | DIASTOLIC BLOOD PRESSURE: 82 MMHG | RESPIRATION RATE: 13 BRPM | WEIGHT: 293 LBS | HEIGHT: 67 IN | HEART RATE: 83 BPM | SYSTOLIC BLOOD PRESSURE: 110 MMHG | OXYGEN SATURATION: 99 %

## 2023-03-06 DIAGNOSIS — G43.009 MIGRAINE WITHOUT AURA AND WITHOUT STATUS MIGRAINOSUS, NOT INTRACTABLE: ICD-10-CM

## 2023-03-06 DIAGNOSIS — E11.9 TYPE 2 DIABETES MELLITUS WITHOUT COMPLICATION, WITHOUT LONG-TERM CURRENT USE OF INSULIN (HCC): Primary | ICD-10-CM

## 2023-03-06 DIAGNOSIS — N89.8 VAGINAL ITCHING: ICD-10-CM

## 2023-03-06 DIAGNOSIS — L98.9 SKIN LESION: ICD-10-CM

## 2023-03-06 DIAGNOSIS — M54.50 CHRONIC MIDLINE LOW BACK PAIN, UNSPECIFIED WHETHER SCIATICA PRESENT: ICD-10-CM

## 2023-03-06 DIAGNOSIS — G89.29 CHRONIC MIDLINE LOW BACK PAIN, UNSPECIFIED WHETHER SCIATICA PRESENT: ICD-10-CM

## 2023-03-06 PROCEDURE — G8417 CALC BMI ABV UP PARAM F/U: HCPCS | Performed by: NURSE PRACTITIONER

## 2023-03-06 PROCEDURE — 99205 OFFICE O/P NEW HI 60 MIN: CPT | Performed by: NURSE PRACTITIONER

## 2023-03-06 PROCEDURE — G8427 DOCREV CUR MEDS BY ELIG CLIN: HCPCS | Performed by: NURSE PRACTITIONER

## 2023-03-06 PROCEDURE — 3046F HEMOGLOBIN A1C LEVEL >9.0%: CPT | Performed by: NURSE PRACTITIONER

## 2023-03-06 PROCEDURE — 1036F TOBACCO NON-USER: CPT | Performed by: NURSE PRACTITIONER

## 2023-03-06 PROCEDURE — 2022F DILAT RTA XM EVC RTNOPTHY: CPT | Performed by: NURSE PRACTITIONER

## 2023-03-06 PROCEDURE — G8484 FLU IMMUNIZE NO ADMIN: HCPCS | Performed by: NURSE PRACTITIONER

## 2023-03-06 RX ORDER — GLUCOSAMINE HCL/CHONDROITIN SU 500-400 MG
CAPSULE ORAL
Qty: 100 STRIP | Refills: 0 | Status: SHIPPED | OUTPATIENT
Start: 2023-03-06 | End: 2023-03-07 | Stop reason: SDUPTHER

## 2023-03-06 RX ORDER — SUMATRIPTAN 100 MG/1
TABLET, FILM COATED ORAL
Qty: 9 TABLET | Refills: 5 | Status: SHIPPED | OUTPATIENT
Start: 2023-03-06

## 2023-03-06 RX ORDER — BLOOD-GLUCOSE METER
1 KIT MISCELLANEOUS DAILY
Qty: 1 KIT | Refills: 0 | Status: SHIPPED | OUTPATIENT
Start: 2023-03-06 | End: 2023-03-07 | Stop reason: SDUPTHER

## 2023-03-06 RX ORDER — FLUCONAZOLE 150 MG/1
150 TABLET ORAL ONCE
Qty: 1 TABLET | Refills: 0 | Status: SHIPPED | OUTPATIENT
Start: 2023-03-06 | End: 2023-03-06

## 2023-03-06 RX ORDER — TIRZEPATIDE 2.5 MG/.5ML
2.5 INJECTION, SOLUTION SUBCUTANEOUS WEEKLY
Qty: 2 ML | Refills: 0 | Status: SHIPPED | OUTPATIENT
Start: 2023-03-06

## 2023-03-06 RX ORDER — DULOXETIN HYDROCHLORIDE 60 MG/1
60 CAPSULE, DELAYED RELEASE ORAL DAILY
Qty: 30 CAPSULE | Refills: 0 | Status: SHIPPED | OUTPATIENT
Start: 2023-03-06 | End: 2023-04-05

## 2023-03-06 RX ORDER — METHOCARBAMOL 750 MG/1
750 TABLET, FILM COATED ORAL 3 TIMES DAILY PRN
Qty: 30 TABLET | Refills: 0 | Status: SHIPPED | OUTPATIENT
Start: 2023-03-06

## 2023-03-06 RX ORDER — LANCETS 30 GAUGE
1 EACH MISCELLANEOUS 2 TIMES DAILY
Qty: 100 EACH | Refills: 5 | Status: SHIPPED | OUTPATIENT
Start: 2023-03-06 | End: 2023-03-07 | Stop reason: SDUPTHER

## 2023-03-06 SDOH — HEALTH STABILITY: PHYSICAL HEALTH: ON AVERAGE, HOW MANY MINUTES DO YOU ENGAGE IN EXERCISE AT THIS LEVEL?: 30 MIN

## 2023-03-06 SDOH — HEALTH STABILITY: PHYSICAL HEALTH: ON AVERAGE, HOW MANY DAYS PER WEEK DO YOU ENGAGE IN MODERATE TO STRENUOUS EXERCISE (LIKE A BRISK WALK)?: 4 DAYS

## 2023-03-06 SDOH — ECONOMIC STABILITY: FOOD INSECURITY: WITHIN THE PAST 12 MONTHS, YOU WORRIED THAT YOUR FOOD WOULD RUN OUT BEFORE YOU GOT MONEY TO BUY MORE.: NEVER TRUE

## 2023-03-06 SDOH — ECONOMIC STABILITY: INCOME INSECURITY: HOW HARD IS IT FOR YOU TO PAY FOR THE VERY BASICS LIKE FOOD, HOUSING, MEDICAL CARE, AND HEATING?: NOT HARD AT ALL

## 2023-03-06 SDOH — ECONOMIC STABILITY: FOOD INSECURITY: WITHIN THE PAST 12 MONTHS, THE FOOD YOU BOUGHT JUST DIDN'T LAST AND YOU DIDN'T HAVE MONEY TO GET MORE.: NEVER TRUE

## 2023-03-06 SDOH — ECONOMIC STABILITY: HOUSING INSECURITY
IN THE LAST 12 MONTHS, WAS THERE A TIME WHEN YOU DID NOT HAVE A STEADY PLACE TO SLEEP OR SLEPT IN A SHELTER (INCLUDING NOW)?: NO

## 2023-03-06 ASSESSMENT — PATIENT HEALTH QUESTIONNAIRE - PHQ9
3. TROUBLE FALLING OR STAYING ASLEEP: 0
10. IF YOU CHECKED OFF ANY PROBLEMS, HOW DIFFICULT HAVE THESE PROBLEMS MADE IT FOR YOU TO DO YOUR WORK, TAKE CARE OF THINGS AT HOME, OR GET ALONG WITH OTHER PEOPLE: 0
7. TROUBLE CONCENTRATING ON THINGS, SUCH AS READING THE NEWSPAPER OR WATCHING TELEVISION: 0
6. FEELING BAD ABOUT YOURSELF - OR THAT YOU ARE A FAILURE OR HAVE LET YOURSELF OR YOUR FAMILY DOWN: 0
SUM OF ALL RESPONSES TO PHQ QUESTIONS 1-9: 0
SUM OF ALL RESPONSES TO PHQ9 QUESTIONS 1 & 2: 0
SUM OF ALL RESPONSES TO PHQ QUESTIONS 1-9: 0
9. THOUGHTS THAT YOU WOULD BE BETTER OFF DEAD, OR OF HURTING YOURSELF: 0
8. MOVING OR SPEAKING SO SLOWLY THAT OTHER PEOPLE COULD HAVE NOTICED. OR THE OPPOSITE, BEING SO FIGETY OR RESTLESS THAT YOU HAVE BEEN MOVING AROUND A LOT MORE THAN USUAL: 0
5. POOR APPETITE OR OVEREATING: 0
4. FEELING TIRED OR HAVING LITTLE ENERGY: 0
1. LITTLE INTEREST OR PLEASURE IN DOING THINGS: 0
SUM OF ALL RESPONSES TO PHQ QUESTIONS 1-9: 0
SUM OF ALL RESPONSES TO PHQ QUESTIONS 1-9: 0
2. FEELING DOWN, DEPRESSED OR HOPELESS: 0

## 2023-03-06 ASSESSMENT — ENCOUNTER SYMPTOMS
BACK PAIN: 1
COUGH: 0
ABDOMINAL PAIN: 0
SHORTNESS OF BREATH: 0
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
## 2023-03-07 ENCOUNTER — TELEPHONE (OUTPATIENT)
Dept: PRIMARY CARE CLINIC | Age: 38
End: 2023-03-07

## 2023-03-07 DIAGNOSIS — E11.9 TYPE 2 DIABETES MELLITUS WITHOUT COMPLICATION, WITHOUT LONG-TERM CURRENT USE OF INSULIN (HCC): ICD-10-CM

## 2023-03-07 RX ORDER — GLUCOSAMINE HCL/CHONDROITIN SU 500-400 MG
CAPSULE ORAL
Qty: 100 STRIP | Refills: 0 | Status: SHIPPED | OUTPATIENT
Start: 2023-03-07

## 2023-03-07 RX ORDER — BLOOD-GLUCOSE METER
1 KIT MISCELLANEOUS DAILY
Qty: 1 KIT | Refills: 0 | Status: SHIPPED | OUTPATIENT
Start: 2023-03-07

## 2023-03-07 RX ORDER — LANCETS 30 GAUGE
1 EACH MISCELLANEOUS 2 TIMES DAILY
Qty: 100 EACH | Refills: 5 | Status: SHIPPED | OUTPATIENT
Start: 2023-03-07

## 2023-03-07 NOTE — TELEPHONE ENCOUNTER
Patient called in stating that her pharmacy told her that the glucose monitor, test strips, and lancets were not covered by her insurance as well as the mounjaro. She was told to contact the office for a prior auth or an alternative.  She states they are faxing over a prior [facility] ([place], [place]) code

## 2023-03-09 ENCOUNTER — OFFICE VISIT (OUTPATIENT)
Dept: OBGYN CLINIC | Age: 38
End: 2023-03-09

## 2023-03-09 ENCOUNTER — HOSPITAL ENCOUNTER (OUTPATIENT)
Age: 38
Setting detail: SPECIMEN
Discharge: HOME | End: 2023-03-09

## 2023-03-09 VITALS
HEIGHT: 67 IN | WEIGHT: 293 LBS | DIASTOLIC BLOOD PRESSURE: 82 MMHG | SYSTOLIC BLOOD PRESSURE: 128 MMHG | BODY MASS INDEX: 45.99 KG/M2

## 2023-03-09 DIAGNOSIS — Z80.3 FAMILY HISTORY OF BREAST CANCER IN MOTHER: ICD-10-CM

## 2023-03-09 DIAGNOSIS — N92.1 MENORRHAGIA WITH IRREGULAR CYCLE: ICD-10-CM

## 2023-03-09 DIAGNOSIS — N89.8 VAGINAL ITCHING: ICD-10-CM

## 2023-03-09 DIAGNOSIS — Z12.31 ENCOUNTER FOR SCREENING MAMMOGRAM FOR MALIGNANT NEOPLASM OF BREAST: ICD-10-CM

## 2023-03-09 DIAGNOSIS — Z01.419 WELL WOMAN EXAM WITH ROUTINE GYNECOLOGICAL EXAM: Primary | ICD-10-CM

## 2023-03-09 DIAGNOSIS — R22.41 MASS OF THIGH, RIGHT: ICD-10-CM

## 2023-03-09 LAB
CANDIDA SPECIES, DNA PROBE: NEGATIVE
GARDNERELLA VAGINALIS, DNA PROBE: POSITIVE
SOURCE: ABNORMAL
TRICHOMONAS VAGINALIS DNA: NEGATIVE

## 2023-03-09 ASSESSMENT — ENCOUNTER SYMPTOMS
COUGH: 0
VOMITING: 0
SHORTNESS OF BREATH: 0
NAUSEA: 0
ABDOMINAL PAIN: 0

## 2023-03-09 NOTE — PROGRESS NOTES
Yovany Padilla is a 40 y.o.  here for her annual exam.  The patient was seen and examined. The patients past medical, surgical, social and family history were reviewed. Current medications and allergies were reviewed, and documented in the chart. Tobacco abuse No    LAst PAP:  WNL, states thinks may have had abnormal in past but never had colposcopy. Family hx:   Breast cancer: Her mother has recently been diagnosed with breast cancer. Colon cancer: Father  Uterine or ovarian cancer: states possibly someone on maternal side had ovarian cancer but unsure. Sexually active: yes, Vaginal discharge: yes - with itching frequent yeast infections she has uncontrolled diabetes also. ,  UTI symptoms: no, voiding difficulties: no, bowels regular:Yes bloating:no      Menstrual history:  Menarche age- 12, cycle irregular she currently undergoing work up in regards to this she has completed labs has uncontrolled diabetes just established with new pcp for treatment.    She has pelvic US scheduled 3/13/23   Birth control: none  LMP: 3/6/23    OB History    Para Term  AB Living   6 2 2   4 2   SAB IAB Ectopic Molar Multiple Live Births   4         2      # Outcome Date GA Lbr Sebas/2nd Weight Sex Delivery Anes PTL Lv   6 SAB 18           5 Term 08    F CS-LTranv   JASON   4 2008           3 2007           2 Term 05    F CS-LTranv   JASON      Complications: Cord around body   1 SAB                Vitals:    23 1133   BP: 128/82   Site: Right Upper Arm   Position: Sitting   Cuff Size: Large Adult   Weight: (!) 362 lb (164.2 kg)   Height: 5' 7\" (1.702 m)       Wt Readings from Last 3 Encounters:   23 (!) 362 lb (164.2 kg)   23 (!) 364 lb 9.6 oz (165.4 kg)   23 (!) 370 lb (167.8 kg)     Past Medical History:   Diagnosis Date    Anxiety     Arthritis     Bipolar disorder ([facility] [address]     Complication of anesthesia     It has been known for my breathing (oxygen) to be low after surgeries. Depression     Disc disease, degenerative, lumbar or lumbosacral 2002    Disc disease, degenerative, lumbar or lumbosacral     Drug effect     Alot of medication allergies    Fibromyalgia     GERD (gastroesophageal reflux disease)     IBS (irritable bowel syndrome)     Insulin resistance 2020    Migraine     Migraines     Obesity     Overactive bladder     Sometimes it feels like i have to go really bad but then i barely go.  Other times i am always going    Type 2 diabetes mellitus without complication ([address]     My last gyn told me i had this    Uterine disorder     My last gyn said i has an enlarged uterus                                                                   Past Surgical History:   Procedure Laterality Date     SECTION  2005     SECTION  2008    CHOLECYSTECTOMY  2000    KNEE SURGERY Left 2012    SHOULDER SURGERY Right 2017    tendon tear    TOOTH EXTRACTION      WISDOM TOOTH EXTRACTION       Family History   Problem Relation Age of Onset    Diabetes Maternal Grandmother     Cervical Cancer Maternal Grandmother     Cancer Maternal Grandmother         Skin    Diabetes Mother     Depression Mother     Breast Cancer Mother         Diagnosed in     Colon Cancer Father     Diabetes Brother     Cancer Maternal Grandfather         Colon    No Known Problems Paternal Grandmother     No Known Problems Paternal Grandfather     Asthma Sister         My daughter    Uterine Cancer Neg Hx     Stroke Neg Hx     Heart Attack Neg Hx      Social History     Tobacco Use   Smoking Status Never   Smokeless Tobacco Never     Social History     Substance and Sexual Activity   Alcohol Use Not Currently    Comment: Its been over three years since my last drink        Social History       Tobacco History       Smoking Status  Never      Smokeless Tobacco Use  Never              Alcohol History       Alcohol Use Status  Not Currently Drinks/Week  0 Glasses of wine, 0 Cans of beer, 0 Shots of liquor, 0 Drinks containing 0.5 oz of alcohol per week Comment  Its been over three years since my last drink              Drug Use       Drug Use Status  Never              Sexual Activity       Sexually Active  Yes Partners  Male Comment  My                   Allergies   Allergen Reactions    Azithromycin Anaphylaxis    Bee Venom Anaphylaxis    Penicillins Anaphylaxis and Hives    Propoxyphene Anaphylaxis    Bupropion Hives    Cephalexin     Gabapentin      [place] helicopters     Levofloxacin Hives    Motrin [Ibuprofen] Nausea Only    Paroxetine Hcl Hives    Rofecoxib     Zoloft [Sertraline Hcl]      Current Outpatient Medications   Medication Sig Dispense Refill    glucose monitoring (FREESTYLE FREEDOM) kit 1 kit by Does not apply route daily 1 kit 0    blood glucose monitor strips Test two times a day & as needed for symptoms of irregular blood glucose. Dispense sufficient amount for indicated testing frequency plus additional to accommodate PRN testing needs. 100 strip 0    Lancets MISC 1 each by Does not apply route 2 times daily 100 each 5    Tirzepatide (MOUNJARO) 2.5 MG/0.5ML SOPN SC injection Inject 0.5 mLs into the skin once a week 2 mL 0    methocarbamol (ROBAXIN-750) 750 MG tablet Take 1 tablet by mouth 3 times daily as needed (muscle spasm) 30 tablet 0    SUMAtriptan (IMITREX) 100 MG tablet sumatriptan 100 mg tablet  TAKE 1 TABLET BY MOUTH ONCE AT ONSET, THEN REPEAT IN 1 HOUR FOR 1 DOSE IF STILL ONGOING.  9 tablet 5    DULoxetine (CYMBALTA) 60 MG extended release capsule Take 1 capsule by mouth daily 30 capsule 0    Probiotic Acidophilus (FLORANEX) TABS Take 1 tablet by mouth daily 30 tablet 11    tiZANidine (ZANAFLEX) 4 MG tablet TAKE 1 TABLET BY MOUTH NIGHTLY AS NEEDED FOR MUSCLE SPASM (Patient not taking: No sig reported) 30 tablet 0    EPINEPHrine (EPIPEN 2-NARCISO) 0.3 MG/0.3ML SOAJ injection Inject 0.3 mLs into the muscle once for 1 dose Use as directed for allergic reaction 2 each 0     No current facility-administered medications for this visit. Subjective:     Review of Systems   Constitutional:  Negative for chills and fever. Respiratory:  Negative for cough and shortness of breath. Cardiovascular:  Negative for chest pain and leg swelling. Gastrointestinal:  Negative for abdominal pain, nausea and vomiting. Genitourinary:  Positive for menstrual problem, pelvic pain, vaginal bleeding, vaginal discharge and vaginal pain. Negative for dysuria and flank pain. Neurological:  Negative for dizziness, light-headedness and headaches. Hematological:  Negative for adenopathy. Does not bruise/bleed easily. Psychiatric/Behavioral:  Negative for self-injury and suicidal ideas. Objective:     Physical Exam  Vitals and nursing note reviewed. Constitutional:       General: She is not in acute distress. Appearance: She is well-developed. She is not diaphoretic. HENT:      Head: Normocephalic and atraumatic. Right Ear: External ear normal.      Left Ear: External ear normal.      Nose: Nose normal.   Eyes:      Pupils: Pupils are equal, round, and reactive to light. Neck:      Thyroid: No thyromegaly. Cardiovascular:      Rate and Rhythm: Normal rate and regular rhythm. Heart sounds: Normal heart sounds. No murmur heard. No friction rub. No gallop. Comments: No bilateral calf tenderness or swelling  Pulmonary:      Effort: Pulmonary effort is normal. No respiratory distress. Breath sounds: Normal breath sounds. No wheezing. Abdominal:      General: Bowel sounds are normal.      Palpations: Abdomen is soft. Tenderness: There is no abdominal tenderness.    Genitourinary:     Comments: Breasts nipples everted, no masses or tenderness, does BSE  Vulva-no lesions  Vagina-pink rugated  Cervix-firm, Nontender, freely movable, no lesions  Uterus-Smooth, firm, nontender, freely movable  Adnexa-no masses or tenderness     Large fleshy firm skin colored mass left upper thigh/buttocks region  Musculoskeletal:         General: Normal range of motion. Cervical back: Normal range of motion and neck supple. Lymphadenopathy:      Cervical: No cervical adenopathy. Skin:     General: Skin is warm and dry. Findings: No rash. Neurological:      Mental Status: She is alert and oriented to person, place, and time. Cranial Nerves: No cranial nerve deficit. Deep Tendon Reflexes: Reflexes are normal and symmetric. Psychiatric:         Behavior: Behavior normal.         Thought Content: Thought content normal.         Judgment: Judgment normal.     /82 (Site: Right Upper Arm, Position: Sitting, Cuff Size: Large Adult)   Ht 5' 7\" (1.702 m)   Wt (!) 362 lb (164.2 kg)   LMP 03/06/2023   Breastfeeding No   BMI 56.70 kg/m²     Assessment:       Diagnosis Orders   1. Well woman exam with routine gynecological exam  PAP SMEAR      2. Menorrhagia with irregular cycle  Vaginitis DNA Probe    Chlamydia/GC DNA, Thin Prep      3. Vaginal itching  Vaginitis DNA Probe    Chlamydia/GC DNA, Thin Prep      4. Encounter for screening mammogram for malignant neoplasm of breast  SUNDEEP DIGITAL SCREEN W OR WO CAD BILATERAL      5. Family history of breast cancer in mother  SUNDEEP DIGITAL SCREEN W OR WO CAD BILATERAL      6. Mass of thigh, right            Breast exam completed. Pelvic exam pap smear collected and sent. Cultures sent Yes    Plan:   Collect pap   BSE reviewed, Mammogram ordered: yes given mother recent breast cancer diagnosis  STD prevention reviewed  Gardisil counseling completed for all patients 10-35 yo  Cultures sent tx if positive, discussed diabetes control in regards candida and periods. Complete US and needs f/u discuss AUB tx. Diet & Exercise reviewed with pt.   Large fleshy firm skin colored mass left upper thigh/buttocks region- states has referral to surgeon at [facility] needs to follow up with     Preventive  Health through PCP   VEDA NORRIS after complete US          Orders Placed This Encounter   Procedures    Vaginitis DNA Probe     Standing Status:   Future     Number of Occurrences:   1     Standing Expiration Date:   3/9/2024    Chlamydia/GC DNA, Thin Prep     Standing Status:   Future     Number of Occurrences:   1     Standing Expiration Date:   3/9/2024    SUNDEEP DIGITAL SCREEN W OR WO CAD BILATERAL     Standing Status:   Future     Standing Expiration Date:   3/8/2024     Order Specific Question:   Reason for exam:     Answer:   screening, mother has breast cancer    PAP SMEAR     Patient History:    Patient's last menstrual period was 2023. OBGYN Status: Irregular periods  Past Surgical History:  2005:  SECTION  2008:  SECTION  2000: CHOLECYSTECTOMY  2012: KNEE SURGERY; Left  2017: SHOULDER SURGERY; Right      Comment:  tendon tear  No date: TOOTH EXTRACTION  No date: WISDOM TOOTH EXTRACTION      Social History    Tobacco Use      Smoking status: Never      Smokeless tobacco: Never       Standing Status:   Future     Standing Expiration Date:   3/9/2024     Order Specific Question:   Collection Type     Answer: Thin Prep     Order Specific Question:   Prior Abnormal Pap Test     Answer:   No     Order Specific Question:   Screening or Diagnostic     Answer:   Screening     Order Specific Question:   HPV Requested? Answer:   Yes     Order Specific Question:   High Risk Patient     Answer:   N/A     No orders of the defined types were placed in this encounter. Patient given educational materials - seepatient instructions. Discussed use, benefit, and side effects of prescribed medications. All patient questions answered. Pt voiced understanding. Reviewed health maintenance. Instructed to continue current medications, diet and exercise. Patient agreedwith treatment plan. Follow up as directed.       Electronically signed by Leigh Smith APRN - CNP on 3/9/2023at 5:12 PM

## 2023-03-10 LAB
C TRACH DNA SPEC QL PROBE+SIG AMP: NEGATIVE
N GONORRHOEA DNA SPEC QL PROBE+SIG AMP: NEGATIVE
SPECIMEN DESCRIPTION: NORMAL

## 2023-03-13 ENCOUNTER — HOSPITAL ENCOUNTER (OUTPATIENT)
Dept: ULTRASOUND IMAGING | Age: 38
Discharge: HOME | End: 2023-03-15
Payer: COMMERCIAL

## 2023-03-13 DIAGNOSIS — N92.1 MENORRHAGIA WITH IRREGULAR CYCLE: ICD-10-CM

## 2023-03-13 DIAGNOSIS — R93.89 ENDOMETRIAL THICKENING ON ULTRASOUND: ICD-10-CM

## 2023-03-13 PROCEDURE — 76856 US EXAM PELVIC COMPLETE: CPT

## 2023-03-13 PROCEDURE — 76830 TRANSVAGINAL US NON-OB: CPT

## 2023-03-14 RX ORDER — METRONIDAZOLE 500 MG/1
500 TABLET ORAL 2 TIMES DAILY
Qty: 14 TABLET | Refills: 0 | Status: SHIPPED | OUTPATIENT
Start: 2023-03-14 | End: 2023-03-21

## 2023-03-22 ENCOUNTER — TELEMEDICINE (OUTPATIENT)
Dept: OBGYN CLINIC | Age: 38
End: 2023-03-22
Payer: COMMERCIAL

## 2023-03-22 DIAGNOSIS — E66.01 CLASS 3 SEVERE OBESITY WITH SERIOUS COMORBIDITY AND BODY MASS INDEX (BMI) OF 50.0 TO 59.9 IN ADULT, UNSPECIFIED OBESITY TYPE (HCC): ICD-10-CM

## 2023-03-22 DIAGNOSIS — E28.2 PCOS (POLYCYSTIC OVARIAN SYNDROME): ICD-10-CM

## 2023-03-22 DIAGNOSIS — Z91.199 NONCOMPLIANCE WITH DIABETES TREATMENT: ICD-10-CM

## 2023-03-22 DIAGNOSIS — R93.89 THICKENED ENDOMETRIUM: Primary | ICD-10-CM

## 2023-03-22 DIAGNOSIS — N85.2 BULKY OR ENLARGED UTERUS: ICD-10-CM

## 2023-03-22 DIAGNOSIS — N92.1 MENORRHAGIA WITH IRREGULAR CYCLE: ICD-10-CM

## 2023-03-22 PROCEDURE — G8427 DOCREV CUR MEDS BY ELIG CLIN: HCPCS | Performed by: NURSE PRACTITIONER

## 2023-03-22 PROCEDURE — 99214 OFFICE O/P EST MOD 30 MIN: CPT | Performed by: NURSE PRACTITIONER

## 2023-03-22 ASSESSMENT — ENCOUNTER SYMPTOMS
NAUSEA: 0
SHORTNESS OF BREATH: 0
VOMITING: 0
WHEEZING: 0
COLOR CHANGE: 0

## 2023-03-22 NOTE — PROGRESS NOTES
treatment    She has seen new PCP to work with in regards to diabetes, recommend to continue with them for uncontrolled diabetic care. 6. Class 3 severe obesity with serious comorbidity and body mass index (BMI) of 50.0 to 59.9 in adult, unspecified obesity type ([address]  Diabetic care with pcp, diabetic diet exercise recommended. Return for ASAP for H-scpope D &C with Jose Enrique.       Subjective   HPI    Jorge Camp is here for follow up in regards to [address] and labs. Jorge Camp established care in our office on 2/9/23, with complaints of irregular periods for years and hx thickened endometrium on US in 2020. It was recommended in 2020 by her gynecologist to have Endometrial sampling with hysteroscopy and probable Myosure resection of polyp/fibroid she had it scheduled but then never went through it and  had not had any gynecological care until establishing with us in February. In regards to menstrual cycles she states can be monthly or can bleed almost whole month sometimes. Sometimes will be heavy with clots sometimes if whole month will vary in intensity throughout month of heaviness. She states can bleed through tampons/pads and affect ADLS. She also admits to pelvic pain/cramping with periods. She had Mirena after last pregnancy 2008/2009 stated \"embedded into uterus\" she states she has also been on OCP in past prior to that no hormonal contraception since IUD.  has not had vasectomy, she has not had TL. She is also morbidly obese, hx PCOS  and diabetic and had not had care in over year for diabetes last HGB a1c was in August 2021 and was 7.8. Menarche age 12/13  I had ordered labs and pelvic US and referred her to primary car practice for diabetes. Her blood work was completed, She had PAP and cultures in ur office since and completed pelvic US.   She is now here to discuss results and POC      EXAMINATION:   PELVIC ULTRASOUND       3/13/2023       TECHNIQUE:   Transabdominal and transvaginal

## 2023-04-21 ENCOUNTER — PREP FOR PROCEDURE (OUTPATIENT)
Dept: OBGYN CLINIC | Age: 38
End: 2023-04-21
Payer: COMMERCIAL

## 2023-04-21 DIAGNOSIS — Z01.818 PRE-OP TESTING: Primary | ICD-10-CM

## 2023-04-21 PROCEDURE — 36415 COLL VENOUS BLD VENIPUNCTURE: CPT | Performed by: OBSTETRICS & GYNECOLOGY

## 2023-04-24 ENCOUNTER — OFFICE VISIT (OUTPATIENT)
Dept: PRIMARY CARE CLINIC | Age: 38
End: 2023-04-24
Payer: COMMERCIAL

## 2023-04-24 VITALS
DIASTOLIC BLOOD PRESSURE: 82 MMHG | OXYGEN SATURATION: 98 % | RESPIRATION RATE: 14 BRPM | SYSTOLIC BLOOD PRESSURE: 124 MMHG | BODY MASS INDEX: 45.99 KG/M2 | WEIGHT: 293 LBS | HEART RATE: 68 BPM | HEIGHT: 67 IN

## 2023-04-24 DIAGNOSIS — F31.9 BIPOLAR AFFECTIVE DISORDER, REMISSION STATUS UNSPECIFIED (HCC): ICD-10-CM

## 2023-04-24 DIAGNOSIS — N89.8 VAGINAL ITCHING: ICD-10-CM

## 2023-04-24 DIAGNOSIS — E11.9 TYPE 2 DIABETES MELLITUS WITHOUT COMPLICATION, WITHOUT LONG-TERM CURRENT USE OF INSULIN (HCC): Primary | ICD-10-CM

## 2023-04-24 LAB — HBA1C MFR BLD: 9 %

## 2023-04-24 PROCEDURE — 1036F TOBACCO NON-USER: CPT | Performed by: NURSE PRACTITIONER

## 2023-04-24 PROCEDURE — 83036 HEMOGLOBIN GLYCOSYLATED A1C: CPT | Performed by: NURSE PRACTITIONER

## 2023-04-24 PROCEDURE — 3052F HG A1C>EQUAL 8.0%<EQUAL 9.0%: CPT | Performed by: NURSE PRACTITIONER

## 2023-04-24 PROCEDURE — 2022F DILAT RTA XM EVC RTNOPTHY: CPT | Performed by: NURSE PRACTITIONER

## 2023-04-24 PROCEDURE — G8417 CALC BMI ABV UP PARAM F/U: HCPCS | Performed by: NURSE PRACTITIONER

## 2023-04-24 PROCEDURE — G8427 DOCREV CUR MEDS BY ELIG CLIN: HCPCS | Performed by: NURSE PRACTITIONER

## 2023-04-24 PROCEDURE — 99214 OFFICE O/P EST MOD 30 MIN: CPT | Performed by: NURSE PRACTITIONER

## 2023-04-24 RX ORDER — LANCETS 30 GAUGE
1 EACH MISCELLANEOUS 2 TIMES DAILY
Qty: 300 EACH | Refills: 3 | Status: SHIPPED | OUTPATIENT
Start: 2023-04-24

## 2023-04-24 RX ORDER — FLUCONAZOLE 150 MG/1
150 TABLET ORAL
Qty: 2 TABLET | Refills: 0 | Status: SHIPPED | OUTPATIENT
Start: 2023-04-24 | End: 2023-04-30

## 2023-04-24 RX ORDER — METHOCARBAMOL 750 MG/1
750 TABLET, FILM COATED ORAL 3 TIMES DAILY PRN
Qty: 30 TABLET | Refills: 0 | Status: SHIPPED | OUTPATIENT
Start: 2023-04-24

## 2023-04-24 RX ORDER — GLUCOSAMINE HCL/CHONDROITIN SU 500-400 MG
CAPSULE ORAL
Qty: 300 STRIP | Refills: 3 | Status: SHIPPED | OUTPATIENT
Start: 2023-04-24

## 2023-04-24 ASSESSMENT — ENCOUNTER SYMPTOMS
SHORTNESS OF BREATH: 0
ABDOMINAL PAIN: 0
COUGH: 0
BACK PAIN: 0

## 2023-04-24 ASSESSMENT — PATIENT HEALTH QUESTIONNAIRE - PHQ9
5. POOR APPETITE OR OVEREATING: 0
7. TROUBLE CONCENTRATING ON THINGS, SUCH AS READING THE NEWSPAPER OR WATCHING TELEVISION: 0
10. IF YOU CHECKED OFF ANY PROBLEMS, HOW DIFFICULT HAVE THESE PROBLEMS MADE IT FOR YOU TO DO YOUR WORK, TAKE CARE OF THINGS AT HOME, OR GET ALONG WITH OTHER PEOPLE: 0
SUM OF ALL RESPONSES TO PHQ QUESTIONS 1-9: 0
SUM OF ALL RESPONSES TO PHQ QUESTIONS 1-9: 0
6. FEELING BAD ABOUT YOURSELF - OR THAT YOU ARE A FAILURE OR HAVE LET YOURSELF OR YOUR FAMILY DOWN: 0
SUM OF ALL RESPONSES TO PHQ QUESTIONS 1-9: 0
SUM OF ALL RESPONSES TO PHQ9 QUESTIONS 1 & 2: 0
1. LITTLE INTEREST OR PLEASURE IN DOING THINGS: 0
3. TROUBLE FALLING OR STAYING ASLEEP: 0
4. FEELING TIRED OR HAVING LITTLE ENERGY: 0
2. FEELING DOWN, DEPRESSED OR HOPELESS: 0
8. MOVING OR SPEAKING SO SLOWLY THAT OTHER PEOPLE COULD HAVE NOTICED. OR THE OPPOSITE, BEING SO FIGETY OR RESTLESS THAT YOU HAVE BEEN MOVING AROUND A LOT MORE THAN USUAL: 0
SUM OF ALL RESPONSES TO PHQ QUESTIONS 1-9: 0
9. THOUGHTS THAT YOU WOULD BE BETTER OFF DEAD, OR OF HURTING YOURSELF: 0

## 2023-04-24 NOTE — PROGRESS NOTES
[facility] PRIMARY CARE  [facility] [address] [facility] [address]  [address] [address]  Dept: [phone]  Dept Fax: [phone]    Alyson Reis is a 45 y.o. female who presentstoday for her medical conditions/complaints as noted below. Alyson Reis is c/o of  Chief Complaint   Patient presents with    Diabetes    3 Month Follow-Up           HPI:     Presents for one month recheck  BP well controlled  Has lost 8lb since LOV- congratulated patient  Working on diet/exercise    Hga1c from 10.4 to 9  Congratulated patient  Compliant with meds, tolerating victoza well  Continues to monitor BS, needs additional supplies  C/o vaginal discharge, feels related to elevated BS  Will repeat diflucan  Has est with GYN    Anxiety/depression currently stable with meds    Denies any other problems/concerns      Hemoglobin A1C (%)   Date Value   2023 9.0   2023 10.4 (H)   2021 7.8             ( goal A1C is < 7)   Microalb/Crt. Ratio (mcg/mg creat)   Date Value   2021 CANNOT BE CALCULATED     LDL Cholesterol (mg/dL)   Date Value   2021 49       (goal LDL is <100)   AST (U/L)   Date Value   2021 17     ALT (U/L)   Date Value   2021 17     BUN (mg/dL)   Date Value   2023 10     BP Readings from Last 3 Encounters:   23 124/82   23 128/82   23 110/82          ([id number])    Past Medical History:   Diagnosis Date    Anxiety     Arthritis     Bipolar disorder (HCC)     Complication of anesthesia     It has been known for my breathing (oxygen) to be low after surgeries.     Depression     Disc disease, degenerative, lumbar or lumbosacral     Disc disease, degenerative, lumbar or lumbosacral     Drug effect     Alot of medication allergies    Fibromyalgia     GERD (gastroesophageal reflux disease)     IBS (irritable bowel syndrome)     Insulin resistance 2020    Migraine     Migraines     Obesity     Overactive bladder

## 2023-05-03 ENCOUNTER — HOSPITAL ENCOUNTER (OUTPATIENT)
Dept: PREADMISSION TESTING | Age: 38
Discharge: HOME | End: 2023-05-03
Payer: COMMERCIAL

## 2023-05-03 VITALS
WEIGHT: 293 LBS | SYSTOLIC BLOOD PRESSURE: 128 MMHG | BODY MASS INDEX: 45.99 KG/M2 | HEIGHT: 67 IN | HEART RATE: 90 BPM | RESPIRATION RATE: 16 BRPM | DIASTOLIC BLOOD PRESSURE: 87 MMHG | OXYGEN SATURATION: 98 %

## 2023-05-03 DIAGNOSIS — Z01.818 PRE-OP TESTING: ICD-10-CM

## 2023-05-03 LAB
ABSOLUTE EOS #: 0.15 K/UL (ref 0–0.44)
ABSOLUTE IMMATURE GRANULOCYTE: 0.04 K/UL (ref 0–0.3)
ABSOLUTE LYMPH #: 3.68 K/UL (ref 1.1–3.7)
ABSOLUTE MONO #: 0.66 K/UL (ref 0.1–1.2)
BASOPHILS # BLD: 0 % (ref 0–2)
BASOPHILS ABSOLUTE: 0.05 K/UL (ref 0–0.2)
BILIRUBIN URINE: NEGATIVE
CASTS UA: NORMAL /LPF (ref 0–8)
COLOR: YELLOW
EOSINOPHILS RELATIVE PERCENT: 1 % (ref 1–4)
EPITHELIAL CELLS UA: NORMAL /HPF (ref 0–5)
GLUCOSE UR STRIP.AUTO-MCNC: ABNORMAL MG/DL
HCT VFR BLD AUTO: 43.9 % (ref 36.3–47.1)
HGB BLD-MCNC: 13.1 G/DL (ref 11.9–15.1)
IMMATURE GRANULOCYTES: 0 %
INR PPP: 0.9
KETONES UR STRIP.AUTO-MCNC: NEGATIVE MG/DL
LEUKOCYTE ESTERASE UR QL STRIP.AUTO: NEGATIVE
LYMPHOCYTES # BLD: 31 % (ref 24–43)
MCH RBC QN AUTO: 25.8 PG (ref 25.2–33.5)
MCHC RBC AUTO-ENTMCNC: 29.8 G/DL (ref 28.4–34.8)
MCV RBC AUTO: 86.4 FL (ref 82.6–102.9)
MONOCYTES # BLD: 6 % (ref 3–12)
NITRITE UR QL STRIP.AUTO: NEGATIVE
NRBC AUTOMATED: 0 PER 100 WBC
PARTIAL THROMBOPLASTIN TIME: 27.9 SEC (ref 21.3–31.3)
PDW BLD-RTO: 14 % (ref 11.8–14.4)
PLATELET # BLD AUTO: 336 K/UL (ref 138–453)
PMV BLD AUTO: 9.1 FL (ref 8.1–13.5)
PROT UR STRIP.AUTO-MCNC: 5.5 MG/DL (ref 5–8)
PROT UR STRIP.AUTO-MCNC: NEGATIVE MG/DL
PROTHROMBIN TIME: 9.9 SEC (ref 9.4–12.6)
RBC # BLD: 5.08 M/UL (ref 3.95–5.11)
RBC CLUMPS #/AREA URNS AUTO: NORMAL /HPF (ref 0–4)
SEG NEUTROPHILS: 62 % (ref 36–65)
SEGMENTED NEUTROPHILS ABSOLUTE COUNT: 7.5 K/UL (ref 1.5–8.1)
SPECIFIC GRAVITY UA: 1.02 (ref 1–1.03)
TURBIDITY: ABNORMAL
URINE HGB: ABNORMAL
UROBILINOGEN, URINE: NORMAL
WBC # BLD AUTO: 12.1 K/UL (ref 3.5–11.3)
WBC UA: NORMAL /HPF (ref 0–5)

## 2023-05-03 PROCEDURE — 84443 ASSAY THYROID STIM HORMONE: CPT

## 2023-05-03 PROCEDURE — 85025 COMPLETE CBC W/AUTO DIFF WBC: CPT

## 2023-05-03 PROCEDURE — 83036 HEMOGLOBIN GLYCOSYLATED A1C: CPT

## 2023-05-03 PROCEDURE — 85730 THROMBOPLASTIN TIME PARTIAL: CPT

## 2023-05-03 PROCEDURE — 93005 ELECTROCARDIOGRAM TRACING: CPT

## 2023-05-03 PROCEDURE — 80053 COMPREHEN METABOLIC PANEL: CPT

## 2023-05-03 PROCEDURE — 85610 PROTHROMBIN TIME: CPT

## 2023-05-03 PROCEDURE — 36415 COLL VENOUS BLD VENIPUNCTURE: CPT

## 2023-05-03 PROCEDURE — 81001 URINALYSIS AUTO W/SCOPE: CPT

## 2023-05-03 RX ORDER — CALCIUM CARBONATE 200(500)MG
1 TABLET,CHEWABLE ORAL AS NEEDED
COMMUNITY

## 2023-05-03 NOTE — DISCHARGE INSTRUCTIONS
Preoperative Instructions:    Stop eating solid foods at midnight the night prior to your surgery. Stop drinking clear liquids at midnight the night prior to your surgery. Arrive at the surgery center (3rd entrance) on __[id number]_____________ by ____0730-0800am___________. Please stop any blood thinning medications as directed by your surgeon or prescribing physician. Failure to stop certain medications may interfere with your scheduled surgery. These may include: Aspirin, Coumadin, Plavix, NSAIDS (Motrin, Aleve, Advil, Mobic, Celebrex), Eliquis, Pradaxa, Xarelto, Fish oil, and herbal supplements. You may continue the rest of your medications through the night before surgery unless instructed otherwise. Day of surgery please take only the following medication(s) with a small sip of water: ( Victoza injection) and Slynd      Please  shower with antibacterial soap and water the day before and the morning  the day of surgery. Reminders:  -If you are going home the day of your procedure, you will need a family member or friend to stay during the procedure and drive you home after your procedure. Your  must be 25years of age or older and able to sign off on your discharge instructions.    -If you are going home the same day of your surgery, someone must remain with you for the first 24 hours after your surgery if you receive sedation or anesthesia.      -Please do not wear any jewelery, lotions, or body piercing the day of surgery

## 2023-05-03 NOTE — PROGRESS NOTES
Preoperative Instructions:    Stop eating solid foods at midnight the night prior to your surgery. Stop drinking clear liquids at midnight the night prior to your surgery. Arrive at the surgery center (3rd entrance) on _____[id number]__________ by _____0730-0800am__________. Please stop any blood thinning medications as directed by your surgeon or prescribing physician. Failure to stop certain medications may interfere with your scheduled surgery. These may include: Aspirin, Coumadin, Plavix, NSAIDS (Motrin, Aleve, Advil, Mobic, Celebrex), Eliquis, Pradaxa, Xarelto, Fish oil, and herbal supplements. You may continue the rest of your medications through the night before surgery unless instructed otherwise. Day of surgery please take only the following medication(s) with a small sip of water: (Victoza injection) and Sylnd      Please  shower with antibacterial soap and water the day before and the morning  the day of surgery. Reminders:  -If you are going home the day of your procedure, you will need a family member or friend to stay during the procedure and drive you home after your procedure. Your  must be 25years of age or older and able to sign off on your discharge instructions.    -If you are going home the same day of your surgery, someone must remain with you for the first 24 hours after your surgery if you receive sedation or anesthesia.      -Please do not wear any jewelery , lotions or body piercing the day of surgery

## 2023-05-04 ENCOUNTER — ANESTHESIA EVENT (OUTPATIENT)
Dept: OPERATING ROOM | Age: 38
End: 2023-05-04
Payer: COMMERCIAL

## 2023-05-04 LAB
ALBUMIN SERPL-MCNC: 3.7 G/DL (ref 3.5–5.2)
ALBUMIN/GLOBULIN RATIO: 1.1 (ref 1–2.5)
ALP SERPL-CCNC: 77 U/L (ref 35–104)
ALT SERPL-CCNC: 13 U/L (ref 5–33)
ANION GAP SERPL CALCULATED.3IONS-SCNC: 10 MMOL/L (ref 9–17)
AST SERPL-CCNC: 11 U/L
BILIRUB SERPL-MCNC: 0.3 MG/DL (ref 0.3–1.2)
BUN SERPL-MCNC: 10 MG/DL (ref 6–20)
CALCIUM SERPL-MCNC: 9.3 MG/DL (ref 8.6–10.4)
CHLORIDE SERPL-SCNC: 105 MMOL/L (ref 98–107)
CO2 SERPL-SCNC: 24 MMOL/L (ref 20–31)
CREAT SERPL-MCNC: 0.68 MG/DL (ref 0.5–0.9)
EKG ATRIAL RATE: 80 BPM
EKG P AXIS: 18 DEGREES
EKG P-R INTERVAL: 152 MS
EKG Q-T INTERVAL: 382 MS
EKG QRS DURATION: 80 MS
EKG QTC CALCULATION (BAZETT): 440 MS
EKG R AXIS: 0 DEGREES
EKG T AXIS: 6 DEGREES
EKG VENTRICULAR RATE: 80 BPM
EST. AVERAGE GLUCOSE BLD GHB EST-MCNC: 214 MG/DL
GFR SERPL CREATININE-BSD FRML MDRD: >60 ML/MIN/1.73M2
GLUCOSE SERPL-MCNC: 165 MG/DL (ref 70–99)
HBA1C MFR BLD: 9.1 % (ref 4–6)
POTASSIUM SERPL-SCNC: 4.3 MMOL/L (ref 3.7–5.3)
PROT SERPL-MCNC: 7.2 G/DL (ref 6.4–8.3)
SODIUM SERPL-SCNC: 139 MMOL/L (ref 135–144)
TSH SERPL-ACNC: 0.87 UIU/ML (ref 0.3–5)

## 2023-05-08 ENCOUNTER — TELEPHONE (OUTPATIENT)
Dept: OBGYN CLINIC | Age: 38
End: 2023-05-08

## 2023-05-08 NOTE — TELEPHONE ENCOUNTER
----- Message from Vanessa Hodges DO sent at 5/7/2023  1:28 PM EDT -----  Labs reviewed, can proceed with plan of care as previously discussed in office. However, diabetes is poorly controlled. Please make sure patient is following up with PCP for regulation of hyperglycemia. Thank you.

## 2023-05-16 ENCOUNTER — ANESTHESIA (OUTPATIENT)
Dept: OPERATING ROOM | Age: 38
End: 2023-05-16
Payer: COMMERCIAL

## 2023-05-16 ENCOUNTER — HOSPITAL ENCOUNTER (OUTPATIENT)
Age: 38
Setting detail: OUTPATIENT SURGERY
Discharge: HOME | End: 2023-05-16
Attending: OBSTETRICS & GYNECOLOGY | Admitting: OBSTETRICS & GYNECOLOGY
Payer: COMMERCIAL

## 2023-05-16 ENCOUNTER — TELEPHONE (OUTPATIENT)
Dept: OBGYN CLINIC | Age: 38
End: 2023-05-16

## 2023-05-16 VITALS
DIASTOLIC BLOOD PRESSURE: 89 MMHG | HEART RATE: 73 BPM | SYSTOLIC BLOOD PRESSURE: 106 MMHG | BODY MASS INDEX: 45.99 KG/M2 | OXYGEN SATURATION: 94 % | RESPIRATION RATE: 17 BRPM | HEIGHT: 67 IN | TEMPERATURE: 97.2 F | WEIGHT: 293 LBS

## 2023-05-16 DIAGNOSIS — R93.89 THICKENED ENDOMETRIUM: ICD-10-CM

## 2023-05-16 DIAGNOSIS — N92.0 MENORRHAGIA WITH REGULAR CYCLE: ICD-10-CM

## 2023-05-16 DIAGNOSIS — G89.18 POST-OP PAIN: Primary | ICD-10-CM

## 2023-05-16 PROBLEM — Z98.890 HISTORY OF HYSTEROSCOPY: Status: ACTIVE | Noted: 2023-05-16

## 2023-05-16 LAB
GLUCOSE BLD-MCNC: 158 MG/DL (ref 65–105)
HCG, PREGNANCY URINE (POC): NEGATIVE

## 2023-05-16 PROCEDURE — 2500000003 HC RX 250 WO HCPCS: Performed by: NURSE ANESTHETIST, CERTIFIED REGISTERED

## 2023-05-16 PROCEDURE — 3700000001 HC ADD 15 MINUTES (ANESTHESIA): Performed by: OBSTETRICS & GYNECOLOGY

## 2023-05-16 PROCEDURE — 7100000011 HC PHASE II RECOVERY - ADDTL 15 MIN: Performed by: OBSTETRICS & GYNECOLOGY

## 2023-05-16 PROCEDURE — 3700000000 HC ANESTHESIA ATTENDED CARE: Performed by: OBSTETRICS & GYNECOLOGY

## 2023-05-16 PROCEDURE — 6360000002 HC RX W HCPCS: Performed by: NURSE ANESTHETIST, CERTIFIED REGISTERED

## 2023-05-16 PROCEDURE — 2580000003 HC RX 258: Performed by: ANESTHESIOLOGY

## 2023-05-16 PROCEDURE — 81025 URINE PREGNANCY TEST: CPT

## 2023-05-16 PROCEDURE — 3600000003 HC SURGERY LEVEL 3 BASE: Performed by: OBSTETRICS & GYNECOLOGY

## 2023-05-16 PROCEDURE — 7100000000 HC PACU RECOVERY - FIRST 15 MIN: Performed by: OBSTETRICS & GYNECOLOGY

## 2023-05-16 PROCEDURE — 7100000010 HC PHASE II RECOVERY - FIRST 15 MIN: Performed by: OBSTETRICS & GYNECOLOGY

## 2023-05-16 PROCEDURE — 2709999900 HC NON-CHARGEABLE SUPPLY: Performed by: OBSTETRICS & GYNECOLOGY

## 2023-05-16 PROCEDURE — 88305 TISSUE EXAM BY PATHOLOGIST: CPT

## 2023-05-16 PROCEDURE — 6370000000 HC RX 637 (ALT 250 FOR IP)

## 2023-05-16 PROCEDURE — 7100000001 HC PACU RECOVERY - ADDTL 15 MIN: Performed by: OBSTETRICS & GYNECOLOGY

## 2023-05-16 PROCEDURE — 2720000010 HC SURG SUPPLY STERILE: Performed by: OBSTETRICS & GYNECOLOGY

## 2023-05-16 PROCEDURE — 3600000013 HC SURGERY LEVEL 3 ADDTL 15MIN: Performed by: OBSTETRICS & GYNECOLOGY

## 2023-05-16 PROCEDURE — 82947 ASSAY GLUCOSE BLOOD QUANT: CPT

## 2023-05-16 RX ORDER — GLYCOPYRROLATE 0.2 MG/ML
0.4 INJECTION INTRAMUSCULAR; INTRAVENOUS ONCE
Status: DISCONTINUED | OUTPATIENT
Start: 2023-05-16 | End: 2023-05-16 | Stop reason: HOSPADM

## 2023-05-16 RX ORDER — IPRATROPIUM BROMIDE AND ALBUTEROL SULFATE 2.5; .5 MG/3ML; MG/3ML
1 SOLUTION RESPIRATORY (INHALATION)
Status: DISCONTINUED | OUTPATIENT
Start: 2023-05-16 | End: 2023-05-16 | Stop reason: HOSPADM

## 2023-05-16 RX ORDER — FENTANYL CITRATE 50 UG/ML
INJECTION, SOLUTION INTRAMUSCULAR; INTRAVENOUS PRN
Status: DISCONTINUED | OUTPATIENT
Start: 2023-05-16 | End: 2023-05-16 | Stop reason: SDUPTHER

## 2023-05-16 RX ORDER — PROPOFOL 10 MG/ML
INJECTION, EMULSION INTRAVENOUS PRN
Status: DISCONTINUED | OUTPATIENT
Start: 2023-05-16 | End: 2023-05-16 | Stop reason: SDUPTHER

## 2023-05-16 RX ORDER — SODIUM CHLORIDE 9 MG/ML
INJECTION, SOLUTION INTRAVENOUS PRN
Status: DISCONTINUED | OUTPATIENT
Start: 2023-05-16 | End: 2023-05-16 | Stop reason: HOSPADM

## 2023-05-16 RX ORDER — DIPHENHYDRAMINE HYDROCHLORIDE 50 MG/ML
12.5 INJECTION INTRAMUSCULAR; INTRAVENOUS
Status: DISCONTINUED | OUTPATIENT
Start: 2023-05-16 | End: 2023-05-16 | Stop reason: HOSPADM

## 2023-05-16 RX ORDER — SODIUM CHLORIDE 9 MG/ML
25 INJECTION, SOLUTION INTRAVENOUS PRN
Status: DISCONTINUED | OUTPATIENT
Start: 2023-05-16 | End: 2023-05-16 | Stop reason: HOSPADM

## 2023-05-16 RX ORDER — IBUPROFEN 800 MG/1
800 TABLET ORAL EVERY 8 HOURS PRN
Qty: 60 TABLET | Refills: 1 | Status: SHIPPED | OUTPATIENT
Start: 2023-05-16 | End: 2023-05-16 | Stop reason: HOSPADM

## 2023-05-16 RX ORDER — HYDRALAZINE HYDROCHLORIDE 20 MG/ML
10 INJECTION INTRAMUSCULAR; INTRAVENOUS
Status: DISCONTINUED | OUTPATIENT
Start: 2023-05-16 | End: 2023-05-16 | Stop reason: HOSPADM

## 2023-05-16 RX ORDER — DROPERIDOL 2.5 MG/ML
0.62 INJECTION, SOLUTION INTRAMUSCULAR; INTRAVENOUS
Status: DISCONTINUED | OUTPATIENT
Start: 2023-05-16 | End: 2023-05-16 | Stop reason: HOSPADM

## 2023-05-16 RX ORDER — MEPERIDINE HYDROCHLORIDE 50 MG/ML
12.5 INJECTION INTRAMUSCULAR; INTRAVENOUS; SUBCUTANEOUS EVERY 5 MIN PRN
Status: DISCONTINUED | OUTPATIENT
Start: 2023-05-16 | End: 2023-05-16 | Stop reason: HOSPADM

## 2023-05-16 RX ORDER — MIDAZOLAM HYDROCHLORIDE 2 MG/2ML
2 INJECTION, SOLUTION INTRAMUSCULAR; INTRAVENOUS
Status: DISCONTINUED | OUTPATIENT
Start: 2023-05-16 | End: 2023-05-16 | Stop reason: HOSPADM

## 2023-05-16 RX ORDER — PROMETHAZINE HYDROCHLORIDE 25 MG/ML
6.25 INJECTION, SOLUTION INTRAMUSCULAR; INTRAVENOUS EVERY 5 MIN PRN
Status: DISCONTINUED | OUTPATIENT
Start: 2023-05-16 | End: 2023-05-16 | Stop reason: HOSPADM

## 2023-05-16 RX ORDER — LABETALOL HYDROCHLORIDE 5 MG/ML
10 INJECTION, SOLUTION INTRAVENOUS
Status: DISCONTINUED | OUTPATIENT
Start: 2023-05-16 | End: 2023-05-16 | Stop reason: HOSPADM

## 2023-05-16 RX ORDER — ONDANSETRON 4 MG/1
4 TABLET, ORALLY DISINTEGRATING ORAL EVERY 8 HOURS PRN
Qty: 30 TABLET | Refills: 0 | Status: SHIPPED | OUTPATIENT
Start: 2023-05-16

## 2023-05-16 RX ORDER — OXYCODONE HYDROCHLORIDE AND ACETAMINOPHEN 5; 325 MG/1; MG/1
2 TABLET ORAL
Status: DISCONTINUED | OUTPATIENT
Start: 2023-05-16 | End: 2023-05-16 | Stop reason: HOSPADM

## 2023-05-16 RX ORDER — ACETAMINOPHEN 500 MG
1000 TABLET ORAL EVERY 6 HOURS PRN
Qty: 60 TABLET | Refills: 1 | Status: SHIPPED | OUTPATIENT
Start: 2023-05-16 | End: 2023-05-16 | Stop reason: HOSPADM

## 2023-05-16 RX ORDER — MORPHINE SULFATE 2 MG/ML
2 INJECTION, SOLUTION INTRAMUSCULAR; INTRAVENOUS EVERY 5 MIN PRN
Status: DISCONTINUED | OUTPATIENT
Start: 2023-05-16 | End: 2023-05-16 | Stop reason: HOSPADM

## 2023-05-16 RX ORDER — SODIUM CHLORIDE 0.9 % (FLUSH) 0.9 %
5-40 SYRINGE (ML) INJECTION EVERY 12 HOURS SCHEDULED
Status: DISCONTINUED | OUTPATIENT
Start: 2023-05-16 | End: 2023-05-16 | Stop reason: HOSPADM

## 2023-05-16 RX ORDER — LIDOCAINE HYDROCHLORIDE 10 MG/ML
INJECTION, SOLUTION EPIDURAL; INFILTRATION; INTRACAUDAL; PERINEURAL PRN
Status: DISCONTINUED | OUTPATIENT
Start: 2023-05-16 | End: 2023-05-16 | Stop reason: SDUPTHER

## 2023-05-16 RX ORDER — HYDROCODONE BITARTRATE AND ACETAMINOPHEN 5; 325 MG/1; MG/1
1 TABLET ORAL EVERY 6 HOURS PRN
Qty: 5 TABLET | Refills: 0 | Status: SHIPPED | OUTPATIENT
Start: 2023-05-16 | End: 2023-05-21

## 2023-05-16 RX ORDER — OXYCODONE HYDROCHLORIDE AND ACETAMINOPHEN 5; 325 MG/1; MG/1
TABLET ORAL
Status: COMPLETED
Start: 2023-05-16 | End: 2023-05-16

## 2023-05-16 RX ORDER — SODIUM CHLORIDE 0.9 % (FLUSH) 0.9 %
5-40 SYRINGE (ML) INJECTION PRN
Status: DISCONTINUED | OUTPATIENT
Start: 2023-05-16 | End: 2023-05-16 | Stop reason: HOSPADM

## 2023-05-16 RX ORDER — ONDANSETRON 2 MG/ML
INJECTION INTRAMUSCULAR; INTRAVENOUS PRN
Status: DISCONTINUED | OUTPATIENT
Start: 2023-05-16 | End: 2023-05-16 | Stop reason: SDUPTHER

## 2023-05-16 RX ORDER — SODIUM CHLORIDE, SODIUM LACTATE, POTASSIUM CHLORIDE, CALCIUM CHLORIDE 600; 310; 30; 20 MG/100ML; MG/100ML; MG/100ML; MG/100ML
INJECTION, SOLUTION INTRAVENOUS CONTINUOUS
Status: DISCONTINUED | OUTPATIENT
Start: 2023-05-16 | End: 2023-05-16 | Stop reason: HOSPADM

## 2023-05-16 RX ORDER — DEXAMETHASONE SODIUM PHOSPHATE 10 MG/ML
INJECTION INTRAMUSCULAR; INTRAVENOUS PRN
Status: DISCONTINUED | OUTPATIENT
Start: 2023-05-16 | End: 2023-05-16 | Stop reason: SDUPTHER

## 2023-05-16 RX ORDER — ONDANSETRON 2 MG/ML
4 INJECTION INTRAMUSCULAR; INTRAVENOUS
Status: DISCONTINUED | OUTPATIENT
Start: 2023-05-16 | End: 2023-05-16 | Stop reason: HOSPADM

## 2023-05-16 RX ORDER — OXYCODONE HYDROCHLORIDE AND ACETAMINOPHEN 5; 325 MG/1; MG/1
1 TABLET ORAL
Status: COMPLETED | OUTPATIENT
Start: 2023-05-16 | End: 2023-05-16

## 2023-05-16 RX ADMIN — OXYCODONE HYDROCHLORIDE AND ACETAMINOPHEN 1 TABLET: 5; 325 TABLET ORAL at 11:10

## 2023-05-16 RX ADMIN — PROPOFOL 250 MG: 10 INJECTION, EMULSION INTRAVENOUS at 09:49

## 2023-05-16 RX ADMIN — Medication 10 MG: at 09:54

## 2023-05-16 RX ADMIN — SODIUM CHLORIDE, POTASSIUM CHLORIDE, SODIUM LACTATE AND CALCIUM CHLORIDE: 600; 310; 30; 20 INJECTION, SOLUTION INTRAVENOUS at 09:49

## 2023-05-16 RX ADMIN — LIDOCAINE HYDROCHLORIDE 100 MG: 10 INJECTION, SOLUTION EPIDURAL; INFILTRATION; INTRACAUDAL; PERINEURAL at 09:49

## 2023-05-16 RX ADMIN — FENTANYL CITRATE 100 MCG: 50 INJECTION, SOLUTION INTRAMUSCULAR; INTRAVENOUS at 09:49

## 2023-05-16 RX ADMIN — ONDANSETRON 4 MG: 2 INJECTION INTRAMUSCULAR; INTRAVENOUS at 09:54

## 2023-05-16 ASSESSMENT — PAIN DESCRIPTION - LOCATION: LOCATION: VAGINA

## 2023-05-16 ASSESSMENT — PAIN SCALES - GENERAL: PAINLEVEL_OUTOF10: 6

## 2023-05-16 ASSESSMENT — PAIN DESCRIPTION - DESCRIPTORS: DESCRIPTORS: ACHING

## 2023-05-16 ASSESSMENT — PAIN - FUNCTIONAL ASSESSMENT: PAIN_FUNCTIONAL_ASSESSMENT: 0-10

## 2023-05-16 NOTE — ANESTHESIA PRE PROCEDURE
Department of Anesthesiology  Preprocedure Note       Name:  Portia Cisneros   Age:  45 y.o.  :  1985                                          MRN:  [id number]         Date:  2023      Surgeon: Indira Traore):  Maulik De La O DO    Procedure: Procedure(s):  DILATATION AND CURETTAGE HYSTEROSCOPY MYOSURE XL    Medications prior to admission:   Prior to Admission medications    Medication Sig Start Date End Date Taking? Authorizing Provider   HYDROcodone-acetaminophen (NORCO) 5-325 MG per tablet Take 1 tablet by mouth every 6 hours as needed for Pain for up to 5 days. Intended supply: 7 days. Take lowest dose possible to manage pain Max Daily Amount: 4 tablets 23 Yes Pedrito Beckford DO   calcium carbonate (TUMS) 500 MG chewable tablet Take 1 tablet by mouth as needed for Heartburn    Historical Provider, MD   methocarbamol (ROBAXIN-750) 750 MG tablet Take 1 tablet by mouth 3 times daily as needed (muscle spasm) 23   CLARY Romero CNP   blood glucose monitor strips Test two times a day & as needed for symptoms of irregular blood glucose. Dispense sufficient amount for indicated testing frequency plus additional to accommodate PRN testing needs.  23   CLARY Romero CNP   Lancets MISC 1 each by Does not apply route 2 times daily 23   CLARY Romero CNP   Drospirenone (SLYND) 4 MG TABS Take 1 tablet by mouth daily 23   CLARY Lester CNP   Liraglutide (VICTOZA) 18 MG/3ML SOPN SC injection Inject 1.8 mg into the skin daily 23   CLARY Romero CNP   Insulin Pen Needle 32G X 4 MM MISC 1 each by Does not apply route daily 23   CLARY Romero CNP   glucose monitoring (FREESTYLE FREEDOM) kit 1 kit by Does not apply route daily 3/7/23   CLARY Romero CNP   SUMAtriptan (IMITREX) 100 MG tablet sumatriptan 100 mg tablet  TAKE 1 TABLET BY MOUTH ONCE AT ONSET, THEN REPEAT IN 1 HOUR FOR 1 DOSE IF

## 2023-05-16 NOTE — TELEPHONE ENCOUNTER
Left message with a male- she is currently in surgery- she will call back in the next couple of days.

## 2023-05-16 NOTE — ANESTHESIA POSTPROCEDURE EVALUATION
Department of Anesthesiology  Postprocedure Note    Patient: Beto Joya  MRN: [id number]  YOB: 1985  Date of evaluation: 5/16/2023      Procedure Summary     Date: 05/16/23 Room / Location: [facility] OR [address] [place] [address]    Anesthesia Start: 5228 Anesthesia Stop: 3432    Procedure: DILATATION AND CURETTAGE HYSTEROSCOPY MYOSURE XL Diagnosis: Thickened endometrium      Menorrhagia with regular cycle      (Thickened endometrium [R93.89])      (Menorrhagia with regular cycle [N92.0])    Surgeons: Tati Rios DO Responsible Provider: Kaitlynn Matthew MD    Anesthesia Type: general ASA Status: 3          Anesthesia Type: No value filed.     Anette Phase I: Anette Score: 8    Anette Phase II:        Anesthesia Post Evaluation    Patient location during evaluation: PACU  Patient participation: complete - patient participated  Level of consciousness: awake and alert  Airway patency: patent  Nausea & Vomiting: no nausea and no vomiting  Complications: no  Cardiovascular status: hemodynamically stable  Respiratory status: room air and spontaneous ventilation  Hydration status: euvolemic  Multimodal analgesia pain management approach

## 2023-05-16 NOTE — OP NOTE
Operative Note  Department of Obstetrics and Gynecology  [facility]       Patient: Sima Dillon   : 1985  MRN: [id number]       Acct: [de-identified]   PCP: CLARY Fang CNP  Date of Procedure: 23    Pre-operative Diagnosis: 45 y.o. female [id number]  Abnormal uterine bleeding  Thickened endometrium  BMI 56    Post-operative Diagnosis: same, endometrial polyps, large left groin skin tag    Procedure: Hysteroscopy, Dilation and Curettage, Myosure    Surgeon: Dr. Hermila Hendrix  Assistants: Sherron Flores DO PGY3; Ge Avendano    Anesthesia: general    Indications: Patient is a 40YO female with history of abnormal uterine bleeding with heavy periods as well as thickened endometrium on US. TVUS 3/2023 showed abnormally thickened endometrium at 5.2cm and uterus measuring 12 x 7 x 7cm. Patient is agreeable to surgical evaluation at this time. Procedure Details: The patient was seen in the pre-op room. The risks, benefits, complications, treatment options, and expected outcomes were discussed with the patient. The patient concurred with the proposed plan, giving informed consent. The patient was taken to the Operating Room and identified as Sima Dillon and the procedure was verified. A Time Out was held and the above information confirmed. After administration of general anesthesia, the patient was placed in the dorsolithotomy position with Yellofin stirrups and examination under general anesthesia performed with findings as noted below. The patient was prepped and draped in the usual sterile fashion. The bladder was drained with a straight catheter, and a weighted speculum was placed into the vagina to visualize the cervix. The cervix was grasped with a Patty tenaculum. The uterus and the cervix were sounded and measured 12 cm. The cervix was dilated with hegar dilators to size 7.  Myosure hysteroscope was inserted into the uterine cavity showing endocervical and

## 2023-05-16 NOTE — H&P
OB/GYN Pre-Op H&P  [facility]    Patient Name: Fernando Champion     Patient : 1985  Room/Bed: STVZP OR [facility] RM/NONE  Admission Date/Time: 2023  7:29 AM  Primary Care Physician: CLARY Zapata CNP  MRN: [id number]    Date: 2023  Time: 8:17 AM    The patient was seen in pre-op holding. She is here for hysteroscopy, dilation and curettage, Myosure. Patient is a 38YO female with history of abnormal uterine bleeding with heavy periods as well as thickened endometrium on US. TVUS 3/2023 showed abnormally thickened endometrium at 5.2cm and uterus measuring 12 x 7 x 7cm. Patient is agreeable to surgical evaluation at this time. The procedure risks and complications were reviewed. The labs, Consent, and H&P were reviewed and updated. The patient was counseled on the possibility of  the need of a second surgery. The patient voiced understanding and had all of her questions answered. The possibility of incomplete removal of abnormal tissue was discussed. OBSTETRICAL HISTORY:   OB History    Para Term  AB Living   6 2 2 0 4 2   SAB IAB Ectopic Molar Multiple Live Births   4 0 0 0 0 2      # Outcome Date GA Lbr Sebas/2nd Weight Sex Delivery Anes PTL Lv   6 SAB 18           5 Term 08    F CS-LTranv   JASON   4 2008           3 2007           2 Term 05    F CS-LTranv   JASON      Complications: Cord around body   1 SAB                PAST MEDICAL HISTORY:   has a past medical history of Anxiety, Arthritis, Bipolar disorder ([address] Complication of anesthesia, Depression, Disc disease, degenerative, lumbar or lumbosacral, Disc disease, degenerative, lumbar or lumbosacral, Drug effect, Fibromyalgia, GERD (gastroesophageal reflux disease), IBS (irritable bowel syndrome), Insulin resistance, Migraine, Migraines, Obesity, Overactive bladder, Type 2 diabetes mellitus without complication ([address] and Uterine disorder.     PAST SURGICAL HISTORY:

## 2023-05-16 NOTE — TELEPHONE ENCOUNTER
She had mentioned to Dr. Scott Huggins needed refill of slynd but she should have 3 refills at pharmacy does she need it resent still or just contact pharmacy for refill

## 2023-05-31 ENCOUNTER — TELEPHONE (OUTPATIENT)
Dept: OBGYN CLINIC | Age: 38
End: 2023-05-31

## 2023-06-05 LAB — SURGICAL PATHOLOGY REPORT: NORMAL

## 2023-06-05 NOTE — TELEPHONE ENCOUNTER
I do not see it resulted. Please follow up with pathology to see if there is an issue with the result posting. Thank you.

## 2023-06-06 RX ORDER — LIRAGLUTIDE 6 MG/ML
1.8 INJECTION SUBCUTANEOUS DAILY
Qty: 6 ADJUSTABLE DOSE PRE-FILLED PEN SYRINGE | Refills: 3 | Status: SHIPPED | OUTPATIENT
Start: 2023-06-06

## 2023-06-06 NOTE — TELEPHONE ENCOUNTER
----- Message from Jordygloria Pennington sent at 6/5/2023  3:37 PM EDT -----  Subject: Refill Request    QUESTIONS  Name of Medication? Liraglutide (VICTOZA) 18 MG/3ML SOPN SC injection  Patient-reported dosage and instructions? as needed  How many days do you have left? 2  Preferred Pharmacy? [facility]  Pharmacy phone number (if available)? [phone]  ---------------------------------------------------------------------------  --------------  CALL BACK INFO  What is the best way for the office to contact you? OK to leave message on   voicemail  Preferred Call Back Phone Number? [phone]  ---------------------------------------------------------------------------  --------------  SCRIPT ANSWERS  Relationship to Patient?  Self

## 2023-06-09 ENCOUNTER — OFFICE VISIT (OUTPATIENT)
Dept: OBGYN CLINIC | Age: 38
End: 2023-06-09

## 2023-06-09 VITALS
HEIGHT: 67 IN | SYSTOLIC BLOOD PRESSURE: 124 MMHG | DIASTOLIC BLOOD PRESSURE: 70 MMHG | WEIGHT: 293 LBS | BODY MASS INDEX: 45.99 KG/M2

## 2023-06-09 DIAGNOSIS — Z98.890 HISTORY OF HYSTEROSCOPY: ICD-10-CM

## 2023-06-09 DIAGNOSIS — N85.02 COMPLEX ENDOMETRIAL HYPERPLASIA WITH ATYPIA: ICD-10-CM

## 2023-06-09 DIAGNOSIS — Z09 POSTOP CHECK: Primary | ICD-10-CM

## 2023-06-11 RX ORDER — MEGESTROL ACETATE 40 MG/1
40 TABLET ORAL 3 TIMES DAILY
Qty: 30 TABLET | Refills: 3 | Status: SHIPPED | OUTPATIENT
Start: 2023-06-11 | End: 2023-09-09

## 2023-06-26 RX ORDER — PROCHLORPERAZINE 25 MG/1
1 SUPPOSITORY RECTAL
Qty: 1 EACH | Refills: 3 | Status: SHIPPED | OUTPATIENT
Start: 2023-06-26

## 2023-06-26 RX ORDER — PROCHLORPERAZINE 25 MG/1
1 SUPPOSITORY RECTAL CONTINUOUS
Qty: 2 EACH | Refills: 0 | Status: SHIPPED | OUTPATIENT
Start: 2023-06-26 | End: 2023-07-21 | Stop reason: SDUPTHER

## 2023-06-26 RX ORDER — LIRAGLUTIDE 6 MG/ML
1.8 INJECTION SUBCUTANEOUS DAILY
Qty: 6 ADJUSTABLE DOSE PRE-FILLED PEN SYRINGE | Refills: 3 | Status: SHIPPED | OUTPATIENT
Start: 2023-06-26 | End: 2023-07-26 | Stop reason: SDUPTHER

## 2023-07-03 ENCOUNTER — PREP FOR PROCEDURE (OUTPATIENT)
Dept: OBGYN CLINIC | Age: 38
End: 2023-07-03
Payer: COMMERCIAL

## 2023-07-03 DIAGNOSIS — Z01.818 PRE-OP TESTING: Primary | ICD-10-CM

## 2023-07-03 PROCEDURE — 36415 COLL VENOUS BLD VENIPUNCTURE: CPT | Performed by: OBSTETRICS & GYNECOLOGY

## 2023-07-21 RX ORDER — PROCHLORPERAZINE 25 MG/1
SUPPOSITORY RECTAL
Qty: 3 EACH | Refills: 0 | Status: SHIPPED | OUTPATIENT
Start: 2023-07-21

## 2023-07-26 ENCOUNTER — OFFICE VISIT (OUTPATIENT)
Dept: PRIMARY CARE CLINIC | Age: 38
End: 2023-07-26
Payer: COMMERCIAL

## 2023-07-26 ENCOUNTER — TELEPHONE (OUTPATIENT)
Dept: PRIMARY CARE CLINIC | Age: 38
End: 2023-07-26

## 2023-07-26 VITALS
DIASTOLIC BLOOD PRESSURE: 82 MMHG | BODY MASS INDEX: 45.99 KG/M2 | RESPIRATION RATE: 16 BRPM | WEIGHT: 293 LBS | HEIGHT: 67 IN | OXYGEN SATURATION: 97 % | HEART RATE: 91 BPM | SYSTOLIC BLOOD PRESSURE: 124 MMHG

## 2023-07-26 DIAGNOSIS — J06.9 UPPER RESPIRATORY TRACT INFECTION, UNSPECIFIED TYPE: ICD-10-CM

## 2023-07-26 DIAGNOSIS — G89.29 CHRONIC LOW BACK PAIN WITH BILATERAL SCIATICA, UNSPECIFIED BACK PAIN LATERALITY: ICD-10-CM

## 2023-07-26 DIAGNOSIS — M54.42 CHRONIC LOW BACK PAIN WITH BILATERAL SCIATICA, UNSPECIFIED BACK PAIN LATERALITY: ICD-10-CM

## 2023-07-26 DIAGNOSIS — T63.444D: ICD-10-CM

## 2023-07-26 DIAGNOSIS — M54.41 CHRONIC LOW BACK PAIN WITH BILATERAL SCIATICA, UNSPECIFIED BACK PAIN LATERALITY: ICD-10-CM

## 2023-07-26 DIAGNOSIS — E11.9 TYPE 2 DIABETES MELLITUS WITHOUT COMPLICATION, WITHOUT LONG-TERM CURRENT USE OF INSULIN (HCC): Primary | ICD-10-CM

## 2023-07-26 DIAGNOSIS — G43.009 MIGRAINE WITHOUT AURA AND WITHOUT STATUS MIGRAINOSUS, NOT INTRACTABLE: ICD-10-CM

## 2023-07-26 LAB — HBA1C MFR BLD: 8.5 %

## 2023-07-26 PROCEDURE — 2022F DILAT RTA XM EVC RTNOPTHY: CPT | Performed by: NURSE PRACTITIONER

## 2023-07-26 PROCEDURE — 1036F TOBACCO NON-USER: CPT | Performed by: NURSE PRACTITIONER

## 2023-07-26 PROCEDURE — G8427 DOCREV CUR MEDS BY ELIG CLIN: HCPCS | Performed by: NURSE PRACTITIONER

## 2023-07-26 PROCEDURE — 3052F HG A1C>EQUAL 8.0%<EQUAL 9.0%: CPT | Performed by: NURSE PRACTITIONER

## 2023-07-26 PROCEDURE — G8417 CALC BMI ABV UP PARAM F/U: HCPCS | Performed by: NURSE PRACTITIONER

## 2023-07-26 PROCEDURE — 83037 HB GLYCOSYLATED A1C HOME DEV: CPT | Performed by: NURSE PRACTITIONER

## 2023-07-26 PROCEDURE — 99214 OFFICE O/P EST MOD 30 MIN: CPT | Performed by: NURSE PRACTITIONER

## 2023-07-26 RX ORDER — ONDANSETRON 4 MG/1
4 TABLET, ORALLY DISINTEGRATING ORAL EVERY 8 HOURS PRN
Qty: 30 TABLET | Refills: 0 | Status: SHIPPED | OUTPATIENT
Start: 2023-07-26

## 2023-07-26 RX ORDER — DULOXETIN HYDROCHLORIDE 60 MG/1
60 CAPSULE, DELAYED RELEASE ORAL DAILY
Qty: 30 CAPSULE | Refills: 0 | Status: SHIPPED | OUTPATIENT
Start: 2023-07-26

## 2023-07-26 RX ORDER — TIZANIDINE 4 MG/1
4 TABLET ORAL 3 TIMES DAILY
Qty: 90 TABLET | Refills: 1 | Status: SHIPPED | OUTPATIENT
Start: 2023-07-26

## 2023-07-26 RX ORDER — FLUCONAZOLE 150 MG/1
150 TABLET ORAL ONCE
Qty: 1 TABLET | Refills: 1 | Status: SHIPPED | OUTPATIENT
Start: 2023-07-26 | End: 2023-07-26

## 2023-07-26 RX ORDER — LIDOCAINE 50 MG/G
1 PATCH TOPICAL DAILY
Qty: 30 PATCH | Refills: 0 | Status: SHIPPED | OUTPATIENT
Start: 2023-07-26 | End: 2023-08-25

## 2023-07-26 RX ORDER — SUMATRIPTAN 100 MG/1
TABLET, FILM COATED ORAL
Qty: 9 TABLET | Refills: 5 | Status: SHIPPED | OUTPATIENT
Start: 2023-07-26

## 2023-07-26 RX ORDER — EPINEPHRINE 0.3 MG/.3ML
0.3 INJECTION SUBCUTANEOUS ONCE
Qty: 2 EACH | Refills: 0 | Status: SHIPPED | OUTPATIENT
Start: 2023-07-26 | End: 2023-07-26

## 2023-07-26 RX ORDER — ACYCLOVIR 400 MG/1
1 TABLET ORAL
Qty: 2 EACH | Refills: 1 | Status: SHIPPED | OUTPATIENT
Start: 2023-07-26

## 2023-07-26 RX ORDER — DOXYCYCLINE HYCLATE 100 MG
100 TABLET ORAL 2 TIMES DAILY
Qty: 20 TABLET | Refills: 0 | Status: SHIPPED | OUTPATIENT
Start: 2023-07-26 | End: 2023-08-05

## 2023-07-26 RX ORDER — LIRAGLUTIDE 6 MG/ML
1.8 INJECTION SUBCUTANEOUS DAILY
Qty: 6 ADJUSTABLE DOSE PRE-FILLED PEN SYRINGE | Refills: 3 | Status: SHIPPED | OUTPATIENT
Start: 2023-07-26

## 2023-07-26 RX ORDER — GLUCOSAMINE HCL/CHONDROITIN SU 500-400 MG
CAPSULE ORAL
Qty: 300 STRIP | Refills: 3 | Status: SHIPPED | OUTPATIENT
Start: 2023-07-26

## 2023-07-26 RX ORDER — LANCETS 30 GAUGE
1 EACH MISCELLANEOUS 2 TIMES DAILY
Qty: 300 EACH | Refills: 3 | Status: SHIPPED | OUTPATIENT
Start: 2023-07-26

## 2023-07-26 ASSESSMENT — ENCOUNTER SYMPTOMS
SINUS PAIN: 1
SHORTNESS OF BREATH: 0
SINUS PRESSURE: 1
COUGH: 0
ABDOMINAL PAIN: 0
BACK PAIN: 1

## 2023-07-26 ASSESSMENT — PATIENT HEALTH QUESTIONNAIRE - PHQ9
9. THOUGHTS THAT YOU WOULD BE BETTER OFF DEAD, OR OF HURTING YOURSELF: 0
SUM OF ALL RESPONSES TO PHQ QUESTIONS 1-9: 6
1. LITTLE INTEREST OR PLEASURE IN DOING THINGS: 0
SUM OF ALL RESPONSES TO PHQ QUESTIONS 1-9: 6
SUM OF ALL RESPONSES TO PHQ9 QUESTIONS 1 & 2: 0
10. IF YOU CHECKED OFF ANY PROBLEMS, HOW DIFFICULT HAVE THESE PROBLEMS MADE IT FOR YOU TO DO YOUR WORK, TAKE CARE OF THINGS AT HOME, OR GET ALONG WITH OTHER PEOPLE: 1
SUM OF ALL RESPONSES TO PHQ QUESTIONS 1-9: 6
2. FEELING DOWN, DEPRESSED OR HOPELESS: 0
3. TROUBLE FALLING OR STAYING ASLEEP: 3
4. FEELING TIRED OR HAVING LITTLE ENERGY: 3
SUM OF ALL RESPONSES TO PHQ QUESTIONS 1-9: 6
8. MOVING OR SPEAKING SO SLOWLY THAT OTHER PEOPLE COULD HAVE NOTICED. OR THE OPPOSITE, BEING SO FIGETY OR RESTLESS THAT YOU HAVE BEEN MOVING AROUND A LOT MORE THAN USUAL: 0
6. FEELING BAD ABOUT YOURSELF - OR THAT YOU ARE A FAILURE OR HAVE LET YOURSELF OR YOUR FAMILY DOWN: 0
7. TROUBLE CONCENTRATING ON THINGS, SUCH AS READING THE NEWSPAPER OR WATCHING TELEVISION: 0
5. POOR APPETITE OR OVEREATING: 0

## 2023-07-26 NOTE — PROGRESS NOTES
[address] PRIMARY CARE  [place] [id number] [place] [address]  Dept: [phone]  Dept Fax: [phone]    Corey Hanson is a 45 y.o. female who presentstoday for her medical conditions/complaints as noted below.   Corey Hanson is c/o of  Chief Complaint   Patient presents with    Diabetes    Other     Scratchy throat x 2 days, was seen at Urgent care and had negative covid test         HPI:     Presents with daughter for 3 month recheck on chronic condtions  BP well controlled  Has lost 6lb since LOV- congratulated patient    Hga1c from 9 to 8.5  Compliant with meds, denies any side effects  Working on diet  Using CGM, G7- finds this helpful to manage her sugars    C/o headache, sinus pain, congestion, sore throat  Seen at  2 days ago, dx as viral  Has not had any improvement with OTC meds  Will start antibiotic course, needs diflucan    Requesting refill on chronic meds  Chronic conditions are stable    Chronic lower back pain  Has met with pain management in past  Xray done 2020- pars defect noted  Did not complete MRI as she cannot lay flat  No improvement with methocarbamol  States she used norco in the past- reviewed I cannot rx that long term  Suggest to return back to pain management and trial zanaflex  She is willing to try    Denies any other problems/concerns        Hemoglobin A1C (%)   Date Value   07/26/2023 8.5 (H)   05/03/2023 9.1 (H)   04/24/2023 9.0             ( goal A1C is < 7)   No components found for: LABMICR  LDL Cholesterol (mg/dL)   Date Value   07/07/2021 49       (goal LDL is <100)   AST (U/L)   Date Value   05/03/2023 11     ALT (U/L)   Date Value   05/03/2023 13     BUN (mg/dL)   Date Value   05/03/2023 10     BP Readings from Last 3 Encounters:   07/26/23 124/82   06/09/23 124/70   05/16/23 106/89          ([id number]/80)    Past Medical History:   Diagnosis Date    Anxiety     Arthritis     Bipolar disorder ([address])

## 2023-07-26 NOTE — TELEPHONE ENCOUNTER
[address] called stating the dexcom is written to be changed every 14 days but it is only good for 10 days. Writer gave verbal okay to change to every 10 days and dispense 3 instead of 2.

## 2023-08-22 ENCOUNTER — TELEMEDICINE (OUTPATIENT)
Dept: OBGYN CLINIC | Age: 38
End: 2023-08-22
Payer: COMMERCIAL

## 2023-08-22 DIAGNOSIS — N85.02 COMPLEX ENDOMETRIAL HYPERPLASIA WITH ATYPIA: Primary | ICD-10-CM

## 2023-08-22 DIAGNOSIS — R93.89 THICKENED ENDOMETRIUM: ICD-10-CM

## 2023-08-22 DIAGNOSIS — E11.9 TYPE 2 DIABETES MELLITUS WITHOUT COMPLICATION, WITHOUT LONG-TERM CURRENT USE OF INSULIN (HCC): ICD-10-CM

## 2023-08-22 DIAGNOSIS — E28.2 PCOS (POLYCYSTIC OVARIAN SYNDROME): ICD-10-CM

## 2023-08-22 DIAGNOSIS — N92.1 MENORRHAGIA WITH IRREGULAR CYCLE: ICD-10-CM

## 2023-08-22 PROCEDURE — 2022F DILAT RTA XM EVC RTNOPTHY: CPT | Performed by: OBSTETRICS & GYNECOLOGY

## 2023-08-22 PROCEDURE — 99213 OFFICE O/P EST LOW 20 MIN: CPT | Performed by: OBSTETRICS & GYNECOLOGY

## 2023-08-22 PROCEDURE — G8428 CUR MEDS NOT DOCUMENT: HCPCS | Performed by: OBSTETRICS & GYNECOLOGY

## 2023-08-22 PROCEDURE — 3052F HG A1C>EQUAL 8.0%<EQUAL 9.0%: CPT | Performed by: OBSTETRICS & GYNECOLOGY

## 2023-08-22 NOTE — PROGRESS NOTES
Rossy Mak  2023    Rossy Mak (:  1985) has requested an audio/video evaluation for the following concern(s):    1. Complex endometrial hyperplasia with atypia    2. Thickened endometrium    3. Menorrhagia with irregular cycle    4. PCOS (polycystic ovarian syndrome)    5. Type 2 diabetes mellitus without complication, without long-term current use of insulin ([address])        TELEHEALTH EVALUATION -- Audio/Visual (During PIL-92 public health emergency)      Rossy Mak is a 45 y.o. female [id number]      She was here to follow-up regarding her labs and diagnostics ordered at her last visit for the diagnosis of:    ICD-10-CM    1. Complex endometrial hyperplasia with atypia  N85.02       2. Thickened endometrium  R93.89       3. Menorrhagia with irregular cycle  N92.1       4. PCOS (polycystic ovarian syndrome)  E28.2       5. Type 2 diabetes mellitus without complication, without long-term current use of insulin (HCC)  E11.9           She does not have any specific chief complaint today. Her bowels are regular and she is voiding without difficulty. She has questions regarding surgery. Again she is declining GYN ONC referral and understands risk of endometrial malignancy and need for further surgeries. She understands risk of pelvic adhesions and need for aborting surgery for referral to gyn onc or possible open procedure. Recovery expectations discussed in detail. HPI 2023: \"Discussed the results of pathology in extreme detail. Pathophysiology, risk factors, treatment and repeat sampling discussed. Discussed studies and evidence based medicine suggesting use of Mirena IUD has been shown to have higher regression rates. She is declining IUD at this time despite this recommendation. Discussed risk of progression to endometrial malignancy without proper treatment. Discussed surgical options and that this would be definitive therapy. She is considering this.   Discussed

## 2023-08-23 ENCOUNTER — HOSPITAL ENCOUNTER (OUTPATIENT)
Dept: PREADMISSION TESTING | Age: 38
Discharge: HOME | End: 2023-08-23
Payer: COMMERCIAL

## 2023-08-23 VITALS
TEMPERATURE: 97.4 F | DIASTOLIC BLOOD PRESSURE: 75 MMHG | HEART RATE: 79 BPM | RESPIRATION RATE: 18 BRPM | HEIGHT: 67 IN | SYSTOLIC BLOOD PRESSURE: 126 MMHG | WEIGHT: 293 LBS | BODY MASS INDEX: 45.99 KG/M2 | OXYGEN SATURATION: 97 %

## 2023-08-23 DIAGNOSIS — Z01.818 PRE-OP TESTING: ICD-10-CM

## 2023-08-23 LAB
ABO + RH BLD: NORMAL
AMORPH SED URNS QL MICRO: ABNORMAL
ANION GAP SERPL CALCULATED.3IONS-SCNC: 8 MMOL/L (ref 9–17)
ARM BAND NUMBER: NORMAL
BACTERIA URNS QL MICRO: ABNORMAL
BASOPHILS # BLD: 0 K/UL (ref 0–0.2)
BASOPHILS NFR BLD: 0 % (ref 0–2)
BILIRUB UR QL STRIP: NEGATIVE
BLOOD BANK SAMPLE EXPIRATION: NORMAL
BLOOD GROUP ANTIBODIES SERPL: NEGATIVE
BUN SERPL-MCNC: 12 MG/DL (ref 6–20)
CALCIUM SERPL-MCNC: 9.6 MG/DL (ref 8.6–10.4)
CASTS #/AREA URNS LPF: ABNORMAL /LPF (ref 0–2)
CASTS #/AREA URNS LPF: ABNORMAL /LPF (ref 0–2)
CHLORIDE SERPL-SCNC: 104 MMOL/L (ref 98–107)
CLARITY UR: ABNORMAL
CO2 SERPL-SCNC: 26 MMOL/L (ref 20–31)
COLOR UR: YELLOW
CREAT SERPL-MCNC: 0.7 MG/DL (ref 0.5–0.9)
CRYSTALS URNS MICRO: ABNORMAL /HPF
CRYSTALS URNS MICRO: ABNORMAL /HPF
EOSINOPHIL # BLD: 0.1 K/UL (ref 0–0.4)
EOSINOPHILS RELATIVE PERCENT: 1 % (ref 1–4)
EPI CELLS #/AREA URNS HPF: ABNORMAL /HPF (ref 0–5)
ERYTHROCYTE [DISTWIDTH] IN BLOOD BY AUTOMATED COUNT: 17.2 % (ref 12.5–15.4)
GFR SERPL CREATININE-BSD FRML MDRD: >60 ML/MIN/1.73M2
GLUCOSE SERPL-MCNC: 174 MG/DL (ref 70–99)
GLUCOSE UR STRIP-MCNC: ABNORMAL MG/DL
HCT VFR BLD AUTO: 34.9 % (ref 36–46)
HGB BLD-MCNC: 10.8 G/DL (ref 12–16)
HGB UR QL STRIP.AUTO: ABNORMAL
INR PPP: 0.9
KETONES UR STRIP-MCNC: ABNORMAL MG/DL
LEUKOCYTE ESTERASE UR QL STRIP: NEGATIVE
LYMPHOCYTES NFR BLD: 3.8 K/UL (ref 1–4.8)
LYMPHOCYTES RELATIVE PERCENT: 31 % (ref 24–44)
MCH RBC QN AUTO: 22.7 PG (ref 26–34)
MCHC RBC AUTO-ENTMCNC: 30.9 G/DL (ref 31–37)
MCV RBC AUTO: 73.4 FL (ref 80–100)
MONOCYTES NFR BLD: 0.7 K/UL (ref 0.1–1.2)
MONOCYTES NFR BLD: 6 % (ref 2–11)
NEUTROPHILS NFR BLD: 62 % (ref 36–66)
NEUTS SEG NFR BLD: 7.5 K/UL (ref 1.8–7.7)
NITRITE UR QL STRIP: NEGATIVE
PARTIAL THROMBOPLASTIN TIME: 27.3 SEC (ref 21.3–31.3)
PH UR STRIP: 5.5 [PH] (ref 5–8)
PLATELET # BLD AUTO: 353 K/UL (ref 140–450)
PMV BLD AUTO: 6.6 FL (ref 6–12)
POTASSIUM SERPL-SCNC: 4.4 MMOL/L (ref 3.7–5.3)
PROT UR STRIP-MCNC: ABNORMAL MG/DL
PROTHROMBIN TIME: 9.7 SEC (ref 9.4–12.6)
RBC # BLD AUTO: 4.75 M/UL (ref 4–5.2)
RBC #/AREA URNS HPF: ABNORMAL /HPF (ref 0–2)
SODIUM SERPL-SCNC: 138 MMOL/L (ref 135–144)
SP GR UR STRIP: 1.03 (ref 1–1.03)
TSH SERPL DL<=0.05 MIU/L-ACNC: 1.89 UIU/ML (ref 0.3–5)
UROBILINOGEN UR STRIP-ACNC: NORMAL EU/DL (ref 0–1)
WBC #/AREA URNS HPF: ABNORMAL /HPF (ref 0–5)
WBC OTHER # BLD: 12.1 K/UL (ref 3.5–11)

## 2023-08-23 PROCEDURE — 86900 BLOOD TYPING SEROLOGIC ABO: CPT

## 2023-08-23 PROCEDURE — 85730 THROMBOPLASTIN TIME PARTIAL: CPT

## 2023-08-23 PROCEDURE — 86901 BLOOD TYPING SEROLOGIC RH(D): CPT

## 2023-08-23 PROCEDURE — 80048 BASIC METABOLIC PNL TOTAL CA: CPT

## 2023-08-23 PROCEDURE — 36415 COLL VENOUS BLD VENIPUNCTURE: CPT

## 2023-08-23 PROCEDURE — 86850 RBC ANTIBODY SCREEN: CPT

## 2023-08-23 PROCEDURE — 86403 PARTICLE AGGLUT ANTBDY SCRN: CPT

## 2023-08-23 PROCEDURE — 84443 ASSAY THYROID STIM HORMONE: CPT

## 2023-08-23 PROCEDURE — 81001 URINALYSIS AUTO W/SCOPE: CPT

## 2023-08-23 PROCEDURE — 83036 HEMOGLOBIN GLYCOSYLATED A1C: CPT

## 2023-08-23 PROCEDURE — 85025 COMPLETE CBC W/AUTO DIFF WBC: CPT

## 2023-08-23 PROCEDURE — 87086 URINE CULTURE/COLONY COUNT: CPT

## 2023-08-23 PROCEDURE — 85610 PROTHROMBIN TIME: CPT

## 2023-08-23 ASSESSMENT — PAIN DESCRIPTION - LOCATION: LOCATION: BACK

## 2023-08-23 ASSESSMENT — PAIN SCALES - GENERAL: PAINLEVEL_OUTOF10: 4

## 2023-08-23 NOTE — DISCHARGE INSTRUCTIONS
all jewelry at home and wear loose, comfortable clothing that is easy to put on and take off. If you will be returning home the same day as your surgery, you will need to have a responsible adult (25years of age or older) present to drive you home. You will need someone stay with you at home for the first 24 hours following your surgery. This is due to the anesthesia and the medication given to you during surgery and recovery.

## 2023-08-24 LAB
MICROORGANISM SPEC CULT: ABNORMAL
SPECIMEN DESCRIPTION: ABNORMAL

## 2023-08-27 LAB
EST. AVERAGE GLUCOSE BLD GHB EST-MCNC: 203 MG/DL
HBA1C MFR BLD: 8.7 % (ref 4–6)

## 2023-08-28 ENCOUNTER — TELEPHONE (OUTPATIENT)
Dept: OBGYN CLINIC | Age: 38
End: 2023-08-28

## 2023-08-28 NOTE — TELEPHONE ENCOUNTER
----- Message from Denis Mascorro DO sent at 8/26/2023 12:10 PM EDT -----  Please notify patient of normal labs, slight elevated WBC and slight anemia. Please make sure CBC is drawn morning of procedure - thank you.

## 2023-08-29 ENCOUNTER — TELEPHONE (OUTPATIENT)
Dept: OBGYN CLINIC | Age: 38
End: 2023-08-29

## 2023-08-29 NOTE — TELEPHONE ENCOUNTER
----- Message from Brenda Chan DO sent at 8/29/2023 11:05 AM EDT -----  Please make sure that patient is aware that Hgb A1c is higher than it was 7/26. We can proceed with procedure, however she should know that target glucose will be less than 180 for wound healing and avoiding post operative infection. Please make sure she is aware and if any questions or concerns can schedule virtual appt to discuss. Thank you.

## 2023-08-30 ENCOUNTER — TELEPHONE (OUTPATIENT)
Dept: PRIMARY CARE CLINIC | Age: 38
End: 2023-08-30

## 2023-08-30 ENCOUNTER — HOSPITAL ENCOUNTER (OUTPATIENT)
Age: 38
Setting detail: SPECIMEN
Discharge: HOME | End: 2023-08-30

## 2023-08-30 ENCOUNTER — OFFICE VISIT (OUTPATIENT)
Dept: PRIMARY CARE CLINIC | Age: 38
End: 2023-08-30
Payer: COMMERCIAL

## 2023-08-30 VITALS
BODY MASS INDEX: 55.04 KG/M2 | RESPIRATION RATE: 16 BRPM | HEART RATE: 91 BPM | WEIGHT: 293 LBS | SYSTOLIC BLOOD PRESSURE: 128 MMHG | DIASTOLIC BLOOD PRESSURE: 82 MMHG | OXYGEN SATURATION: 99 %

## 2023-08-30 DIAGNOSIS — N39.0 URINARY TRACT INFECTION WITHOUT HEMATURIA, SITE UNSPECIFIED: ICD-10-CM

## 2023-08-30 DIAGNOSIS — R93.89 THICKENED ENDOMETRIUM: Primary | ICD-10-CM

## 2023-08-30 DIAGNOSIS — D50.9 IRON DEFICIENCY ANEMIA, UNSPECIFIED IRON DEFICIENCY ANEMIA TYPE: ICD-10-CM

## 2023-08-30 LAB
IRON SATN MFR SERPL: 11 % (ref 20–55)
IRON SERPL-MCNC: 39 UG/DL (ref 37–145)
TIBC SERPL-MCNC: 340 UG/DL (ref 250–450)
UNSATURATED IRON BINDING CAPACITY: 301 UG/DL (ref 112–347)

## 2023-08-30 PROCEDURE — 1036F TOBACCO NON-USER: CPT | Performed by: NURSE PRACTITIONER

## 2023-08-30 PROCEDURE — G8427 DOCREV CUR MEDS BY ELIG CLIN: HCPCS | Performed by: NURSE PRACTITIONER

## 2023-08-30 PROCEDURE — G8417 CALC BMI ABV UP PARAM F/U: HCPCS | Performed by: NURSE PRACTITIONER

## 2023-08-30 PROCEDURE — 99214 OFFICE O/P EST MOD 30 MIN: CPT | Performed by: NURSE PRACTITIONER

## 2023-08-30 RX ORDER — LINEZOLID 600 MG/1
600 TABLET, FILM COATED ORAL 2 TIMES DAILY
Qty: 14 TABLET | Refills: 0 | Status: SHIPPED | OUTPATIENT
Start: 2023-08-30 | End: 2023-09-06

## 2023-08-30 ASSESSMENT — ENCOUNTER SYMPTOMS
SHORTNESS OF BREATH: 0
COUGH: 0
ABDOMINAL PAIN: 0
BACK PAIN: 0

## 2023-08-30 ASSESSMENT — PATIENT HEALTH QUESTIONNAIRE - PHQ9
SUM OF ALL RESPONSES TO PHQ QUESTIONS 1-9: 2
9. THOUGHTS THAT YOU WOULD BE BETTER OFF DEAD, OR OF HURTING YOURSELF: 0
6. FEELING BAD ABOUT YOURSELF - OR THAT YOU ARE A FAILURE OR HAVE LET YOURSELF OR YOUR FAMILY DOWN: 0
10. IF YOU CHECKED OFF ANY PROBLEMS, HOW DIFFICULT HAVE THESE PROBLEMS MADE IT FOR YOU TO DO YOUR WORK, TAKE CARE OF THINGS AT HOME, OR GET ALONG WITH OTHER PEOPLE: 0
SUM OF ALL RESPONSES TO PHQ QUESTIONS 1-9: 2
SUM OF ALL RESPONSES TO PHQ9 QUESTIONS 1 & 2: 0
5. POOR APPETITE OR OVEREATING: 0
7. TROUBLE CONCENTRATING ON THINGS, SUCH AS READING THE NEWSPAPER OR WATCHING TELEVISION: 0
8. MOVING OR SPEAKING SO SLOWLY THAT OTHER PEOPLE COULD HAVE NOTICED. OR THE OPPOSITE, BEING SO FIGETY OR RESTLESS THAT YOU HAVE BEEN MOVING AROUND A LOT MORE THAN USUAL: 0
SUM OF ALL RESPONSES TO PHQ QUESTIONS 1-9: 2
2. FEELING DOWN, DEPRESSED OR HOPELESS: 0
1. LITTLE INTEREST OR PLEASURE IN DOING THINGS: 0
3. TROUBLE FALLING OR STAYING ASLEEP: 1
4. FEELING TIRED OR HAVING LITTLE ENERGY: 1
SUM OF ALL RESPONSES TO PHQ QUESTIONS 1-9: 2

## 2023-08-30 NOTE — TELEPHONE ENCOUNTER
Patient pharmacy called stating that the antibiotic Linezolid will interfere with the following medication that the pt takes ,Duloxetine,Sumatriptamn

## 2023-08-30 NOTE — PROGRESS NOTES
lumbosacral     Drug effect     Alot of medication allergies    Fibromyalgia     GERD (gastroesophageal reflux disease)     IBS (irritable bowel syndrome)     Insulin resistance 2020    Migraine     Migraines     Obesity     Overactive bladder     Sometimes it feels like i have to go really bad but then i barely go.  Other times i am always going    PONV (postoperative nausea and vomiting)     Prolonged emergence from general anesthesia     Type 2 diabetes mellitus without complication ([address])     My last gyn told me i had this    Uterine disorder     My last gyn said i has an enlarged uterus      Past Surgical History:   Procedure Laterality Date     SECTION  2005     SECTION  2008    CHOLECYSTECTOMY  2000    DILATION AND CURETTAGE OF UTERUS  2023    DILATATION AND CURETTAGE HYSTEROSCOPY MYOSURE XL    DILATION AND CURETTAGE OF UTERUS N/A 2023    DILATATION AND CURETTAGE HYSTEROSCOPY MYOSURE XL performed by Isai Carrasquillo DO at [address] Left 2012    SHOULDER SURGERY Right 2017    tendon tear    TOOTH EXTRACTION      WISDOM TOOTH EXTRACTION         Family History   Problem Relation Age of Onset    Diabetes Maternal Grandmother     Cervical Cancer Maternal Grandmother     Cancer Maternal Grandmother         Skin    Diabetes Mother     Depression Mother     Breast Cancer Mother         Diagnosed in     Colon Cancer Father     Diabetes Brother     Cancer Maternal Grandfather         Colon    No Known Problems Paternal Grandmother     No Known Problems Paternal Grandfather     Asthma Sister         My daughter    Uterine Cancer Neg Hx     Stroke Neg Hx     Heart Attack Neg Hx           Social History     Tobacco Use    Smoking status: Never    Smokeless tobacco: Never   Substance Use Topics    Alcohol use: Not Currently     Comment: Its been over three years since my last drink      Current Outpatient Medications   Medication Sig

## 2023-09-01 ENCOUNTER — ANESTHESIA EVENT (OUTPATIENT)
Dept: OPERATING ROOM | Age: 38
End: 2023-09-01
Payer: MEDICAID

## 2023-09-04 NOTE — H&P
OB/GYN Pre-Op H&P  Ricardo Ulloa    Patient Name: Rossy Mak     Patient : 1985  Room/Bed: STZ OR [facility]/NONE  Admission Date/Time: 2023  6:12 AM  Primary Care Physician: CLARY Tate CNP  MRN: [id number]    Date: 2023  Time: 8:10 AM    The patient was seen in pre-op holding. She is here for Robotic assisted laparoscopic hysterectomy, bilateral salpingectomy, cytoscopy. Patient is a 45year old with a long history of heavy menstrual bleeding. Patient had a pelvic US on 3/13/23 that showed a significantly thickened endometrial stripe at 5.2cm. Uterus was measuring 12.3x 7.0 x7.4cm. 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 Pathology also showed weakly proliferative endometrium with disordered features. Pap smear collected 3/9/23 was NIELM and HPV negative. Patient desires definitive surgical management of her AUB an abnormal pathology. Patient has abdominal surgical history of  x2 (, ) and Cholecystectomy (). Her health history is notable for Type 2 DM with most recent Hgb A1c 8.7. Patient has been extensively counseled regarding increased risk of post-op wound infection with blood glucose >180. The procedure risks and complications were reviewed. The labs, Consent, and H&P were reviewed and updated. The patient was counseled on the possibility of  the need of a second surgery. The patient voiced understanding and had all of her questions answered. The possibility of incomplete removal of abnormal tissue was discussed.     OBSTETRICAL HISTORY:   OB History    Para Term  AB Living   6 2 2 0 4 2   SAB IAB Ectopic Molar Multiple Live Births   4 0 0 0 0 2      # Outcome Date GA Lbr Sebas/2nd Weight Sex Delivery procedure. Risks and complications were reviewed in detail. The patients orders, labs, consents have been completed. The history and physical as well as all supporting surgical documentation will be forwarded to the pre-operative holding area. The patient is aware that this procedure may not alleviate her symptoms. That there may be a necessity for a second surgery and that there may be an incomplete removal of abnormal tissue.     Tee Alvarez DO  Ob/Gyn Resident  Pager: [phone]  [facility], [facility]  9/5/2023, 8:10 AM

## 2023-09-05 ENCOUNTER — HOSPITAL ENCOUNTER (OUTPATIENT)
Age: 38
Discharge: HOME | End: 2023-09-05
Attending: OBSTETRICS & GYNECOLOGY | Admitting: OBSTETRICS & GYNECOLOGY
Payer: MEDICAID

## 2023-09-05 ENCOUNTER — ANESTHESIA (OUTPATIENT)
Dept: OPERATING ROOM | Age: 38
End: 2023-09-05
Payer: MEDICAID

## 2023-09-05 VITALS
BODY MASS INDEX: 45.99 KG/M2 | DIASTOLIC BLOOD PRESSURE: 89 MMHG | SYSTOLIC BLOOD PRESSURE: 140 MMHG | OXYGEN SATURATION: 99 % | RESPIRATION RATE: 16 BRPM | HEART RATE: 86 BPM | WEIGHT: 293 LBS | TEMPERATURE: 97.7 F | HEIGHT: 67 IN

## 2023-09-05 DIAGNOSIS — G89.18 POST-OP PAIN: Primary | ICD-10-CM

## 2023-09-05 DIAGNOSIS — N85.01 ENDOMETRIAL HYPERPLASIA, COMPLEX: ICD-10-CM

## 2023-09-05 PROBLEM — Z91.89 OTHER SPECIFIED PERSONAL RISK FACTORS, NOT ELSEWHERE CLASSIFIED: Status: ACTIVE | Noted: 2023-09-05

## 2023-09-05 PROBLEM — Z40.02 ENCOUNTER FOR PROPHYLACTIC SURGERY FOR RISK FACTOR RELATED TO MALIGNANT NEOPLASM OF OVARY: Status: ACTIVE | Noted: 2023-09-05

## 2023-09-05 PROBLEM — Z98.890 POST-OPERATIVE STATE: Status: ACTIVE | Noted: 2023-09-05

## 2023-09-05 LAB
GLUCOSE BLD-MCNC: 228 MG/DL (ref 65–105)
GLUCOSE BLD-MCNC: 256 MG/DL (ref 65–105)
GLUCOSE BLD-MCNC: 265 MG/DL (ref 65–105)
GLUCOSE BLD-MCNC: 297 MG/DL (ref 65–105)
HCG, PREGNANCY URINE (POC): NEGATIVE

## 2023-09-05 PROCEDURE — C9399 UNCLASSIFIED DRUGS OR BIOLOG: HCPCS

## 2023-09-05 PROCEDURE — 3600000019 HC SURGERY ROBOT ADDTL 15MIN: Performed by: OBSTETRICS & GYNECOLOGY

## 2023-09-05 PROCEDURE — 2709999900 HC NON-CHARGEABLE SUPPLY: Performed by: OBSTETRICS & GYNECOLOGY

## 2023-09-05 PROCEDURE — 3700000000 HC ANESTHESIA ATTENDED CARE: Performed by: OBSTETRICS & GYNECOLOGY

## 2023-09-05 PROCEDURE — 2500000003 HC RX 250 WO HCPCS

## 2023-09-05 PROCEDURE — 2580000003 HC RX 258: Performed by: STUDENT IN AN ORGANIZED HEALTH CARE EDUCATION/TRAINING PROGRAM

## 2023-09-05 PROCEDURE — 6370000000 HC RX 637 (ALT 250 FOR IP)

## 2023-09-05 PROCEDURE — 6360000002 HC RX W HCPCS

## 2023-09-05 PROCEDURE — 7100000000 HC PACU RECOVERY - FIRST 15 MIN: Performed by: OBSTETRICS & GYNECOLOGY

## 2023-09-05 PROCEDURE — 2500000003 HC RX 250 WO HCPCS: Performed by: OBSTETRICS & GYNECOLOGY

## 2023-09-05 PROCEDURE — 82947 ASSAY GLUCOSE BLOOD QUANT: CPT

## 2023-09-05 PROCEDURE — 81025 URINE PREGNANCY TEST: CPT

## 2023-09-05 PROCEDURE — 6360000002 HC RX W HCPCS: Performed by: STUDENT IN AN ORGANIZED HEALTH CARE EDUCATION/TRAINING PROGRAM

## 2023-09-05 PROCEDURE — 2580000003 HC RX 258: Performed by: ANESTHESIOLOGY

## 2023-09-05 PROCEDURE — 3700000001 HC ADD 15 MINUTES (ANESTHESIA): Performed by: OBSTETRICS & GYNECOLOGY

## 2023-09-05 PROCEDURE — 3600000009 HC SURGERY ROBOT BASE: Performed by: OBSTETRICS & GYNECOLOGY

## 2023-09-05 PROCEDURE — S2900 ROBOTIC SURGICAL SYSTEM: HCPCS | Performed by: OBSTETRICS & GYNECOLOGY

## 2023-09-05 PROCEDURE — 7100000001 HC PACU RECOVERY - ADDTL 15 MIN: Performed by: OBSTETRICS & GYNECOLOGY

## 2023-09-05 PROCEDURE — 88307 TISSUE EXAM BY PATHOLOGIST: CPT

## 2023-09-05 PROCEDURE — 2720000010 HC SURG SUPPLY STERILE: Performed by: OBSTETRICS & GYNECOLOGY

## 2023-09-05 PROCEDURE — C1889 IMPLANT/INSERT DEVICE, NOC: HCPCS | Performed by: OBSTETRICS & GYNECOLOGY

## 2023-09-05 PROCEDURE — 6370000000 HC RX 637 (ALT 250 FOR IP): Performed by: STUDENT IN AN ORGANIZED HEALTH CARE EDUCATION/TRAINING PROGRAM

## 2023-09-05 PROCEDURE — 58552 LAPARO-VAG HYST INCL T/O: CPT | Performed by: OBSTETRICS & GYNECOLOGY

## 2023-09-05 DEVICE — GRAFT HUMAN TISSUE AMNIOBAND MEMBRANE 5CMX6CM: Type: IMPLANTABLE DEVICE | Status: FUNCTIONAL

## 2023-09-05 RX ORDER — DULOXETIN HYDROCHLORIDE 30 MG/1
60 CAPSULE, DELAYED RELEASE ORAL DAILY
Status: DISCONTINUED | OUTPATIENT
Start: 2023-09-06 | End: 2023-09-05 | Stop reason: HOSPADM

## 2023-09-05 RX ORDER — SENNOSIDES A AND B 8.6 MG/1
1 TABLET, FILM COATED ORAL 2 TIMES DAILY
Qty: 60 TABLET | Refills: 1 | Status: SHIPPED | OUTPATIENT
Start: 2023-09-05 | End: 2023-09-05 | Stop reason: SDUPTHER

## 2023-09-05 RX ORDER — SODIUM CHLORIDE 9 MG/ML
INJECTION, SOLUTION INTRAVENOUS CONTINUOUS
Status: DISCONTINUED | OUTPATIENT
Start: 2023-09-05 | End: 2023-09-05 | Stop reason: HOSPADM

## 2023-09-05 RX ORDER — METRONIDAZOLE 500 MG/100ML
500 INJECTION, SOLUTION INTRAVENOUS ONCE
Status: COMPLETED | OUTPATIENT
Start: 2023-09-05 | End: 2023-09-05

## 2023-09-05 RX ORDER — SODIUM CHLORIDE 9 MG/ML
INJECTION, SOLUTION INTRAVENOUS PRN
Status: DISCONTINUED | OUTPATIENT
Start: 2023-09-05 | End: 2023-09-05

## 2023-09-05 RX ORDER — ONDANSETRON 2 MG/ML
INJECTION INTRAMUSCULAR; INTRAVENOUS PRN
Status: DISCONTINUED | OUTPATIENT
Start: 2023-09-05 | End: 2023-09-05 | Stop reason: SDUPTHER

## 2023-09-05 RX ORDER — ACETAMINOPHEN 500 MG
1000 TABLET ORAL EVERY 6 HOURS PRN
Qty: 60 TABLET | Refills: 1 | Status: SHIPPED | OUTPATIENT
Start: 2023-09-05 | End: 2023-09-05 | Stop reason: SDUPTHER

## 2023-09-05 RX ORDER — OXYCODONE HYDROCHLORIDE 5 MG/1
5 TABLET ORAL EVERY 6 HOURS PRN
Qty: 28 TABLET | Refills: 0 | Status: SHIPPED | OUTPATIENT
Start: 2023-09-05 | End: 2023-09-05 | Stop reason: SDUPTHER

## 2023-09-05 RX ORDER — INSULIN LISPRO 100 [IU]/ML
0-4 INJECTION, SOLUTION INTRAVENOUS; SUBCUTANEOUS NIGHTLY
Status: DISCONTINUED | OUTPATIENT
Start: 2023-09-05 | End: 2023-09-05 | Stop reason: HOSPADM

## 2023-09-05 RX ORDER — OXYCODONE HYDROCHLORIDE 5 MG/1
10 TABLET ORAL EVERY 4 HOURS PRN
Status: DISCONTINUED | OUTPATIENT
Start: 2023-09-05 | End: 2023-09-05 | Stop reason: HOSPADM

## 2023-09-05 RX ORDER — DEXAMETHASONE SODIUM PHOSPHATE 10 MG/ML
INJECTION, SOLUTION INTRAMUSCULAR; INTRAVENOUS PRN
Status: DISCONTINUED | OUTPATIENT
Start: 2023-09-05 | End: 2023-09-05 | Stop reason: SDUPTHER

## 2023-09-05 RX ORDER — ALBUTEROL SULFATE 90 UG/1
AEROSOL, METERED RESPIRATORY (INHALATION) PRN
Status: DISCONTINUED | OUTPATIENT
Start: 2023-09-05 | End: 2023-09-05 | Stop reason: SDUPTHER

## 2023-09-05 RX ORDER — OXYCODONE HYDROCHLORIDE 5 MG/1
10 TABLET ORAL PRN
Status: DISCONTINUED | OUTPATIENT
Start: 2023-09-05 | End: 2023-09-05

## 2023-09-05 RX ORDER — PROPOFOL 10 MG/ML
INJECTION, EMULSION INTRAVENOUS PRN
Status: DISCONTINUED | OUTPATIENT
Start: 2023-09-05 | End: 2023-09-05 | Stop reason: SDUPTHER

## 2023-09-05 RX ORDER — ONDANSETRON 4 MG/1
4 TABLET, FILM COATED ORAL 3 TIMES DAILY PRN
Qty: 15 TABLET | Refills: 0 | Status: SHIPPED | OUTPATIENT
Start: 2023-09-05 | End: 2023-09-05 | Stop reason: SDUPTHER

## 2023-09-05 RX ORDER — SODIUM CHLORIDE 9 MG/ML
INJECTION, SOLUTION INTRAVENOUS PRN
Status: DISCONTINUED | OUTPATIENT
Start: 2023-09-05 | End: 2023-09-05 | Stop reason: HOSPADM

## 2023-09-05 RX ORDER — INSULIN LISPRO 100 [IU]/ML
0-8 INJECTION, SOLUTION INTRAVENOUS; SUBCUTANEOUS
Status: DISCONTINUED | OUTPATIENT
Start: 2023-09-05 | End: 2023-09-05 | Stop reason: HOSPADM

## 2023-09-05 RX ORDER — ONDANSETRON 4 MG/1
4 TABLET, ORALLY DISINTEGRATING ORAL EVERY 8 HOURS PRN
Status: DISCONTINUED | OUTPATIENT
Start: 2023-09-05 | End: 2023-09-05 | Stop reason: HOSPADM

## 2023-09-05 RX ORDER — ONDANSETRON 2 MG/ML
4 INJECTION INTRAMUSCULAR; INTRAVENOUS
Status: DISCONTINUED | OUTPATIENT
Start: 2023-09-05 | End: 2023-09-05

## 2023-09-05 RX ORDER — FENTANYL CITRATE 50 UG/ML
INJECTION, SOLUTION INTRAMUSCULAR; INTRAVENOUS PRN
Status: DISCONTINUED | OUTPATIENT
Start: 2023-09-05 | End: 2023-09-05 | Stop reason: SDUPTHER

## 2023-09-05 RX ORDER — MIDAZOLAM HYDROCHLORIDE 1 MG/ML
INJECTION INTRAMUSCULAR; INTRAVENOUS PRN
Status: DISCONTINUED | OUTPATIENT
Start: 2023-09-05 | End: 2023-09-05 | Stop reason: SDUPTHER

## 2023-09-05 RX ORDER — ONDANSETRON 4 MG/1
4 TABLET, FILM COATED ORAL 3 TIMES DAILY PRN
Qty: 15 TABLET | Refills: 0 | Status: SHIPPED | OUTPATIENT
Start: 2023-09-05

## 2023-09-05 RX ORDER — METRONIDAZOLE 500 MG/100ML
INJECTION, SOLUTION INTRAVENOUS
Status: COMPLETED
Start: 2023-09-05 | End: 2023-09-05

## 2023-09-05 RX ORDER — TIZANIDINE 4 MG/1
4 TABLET ORAL 3 TIMES DAILY PRN
Status: DISCONTINUED | OUTPATIENT
Start: 2023-09-05 | End: 2023-09-05 | Stop reason: HOSPADM

## 2023-09-05 RX ORDER — SODIUM CHLORIDE 0.9 % (FLUSH) 0.9 %
5-40 SYRINGE (ML) INJECTION EVERY 12 HOURS SCHEDULED
Status: DISCONTINUED | OUTPATIENT
Start: 2023-09-05 | End: 2023-09-05

## 2023-09-05 RX ORDER — SENNA AND DOCUSATE SODIUM 50; 8.6 MG/1; MG/1
1 TABLET, FILM COATED ORAL 2 TIMES DAILY
Status: DISCONTINUED | OUTPATIENT
Start: 2023-09-05 | End: 2023-09-05 | Stop reason: HOSPADM

## 2023-09-05 RX ORDER — FAMOTIDINE 10 MG/ML
INJECTION, SOLUTION INTRAVENOUS
Status: COMPLETED
Start: 2023-09-05 | End: 2023-09-05

## 2023-09-05 RX ORDER — CALCIUM CARBONATE 500 MG/1
1 TABLET, CHEWABLE ORAL 3 TIMES DAILY PRN
Status: DISCONTINUED | OUTPATIENT
Start: 2023-09-05 | End: 2023-09-05 | Stop reason: HOSPADM

## 2023-09-05 RX ORDER — INSULIN LISPRO 100 [IU]/ML
0-8 INJECTION, SOLUTION INTRAVENOUS; SUBCUTANEOUS
Status: DISCONTINUED | OUTPATIENT
Start: 2023-09-05 | End: 2023-09-05

## 2023-09-05 RX ORDER — INSULIN GLARGINE 100 [IU]/ML
4 INJECTION, SOLUTION SUBCUTANEOUS ONCE
Status: COMPLETED | OUTPATIENT
Start: 2023-09-05 | End: 2023-09-05

## 2023-09-05 RX ORDER — SCOLOPAMINE TRANSDERMAL SYSTEM 1 MG/1
1 PATCH, EXTENDED RELEASE TRANSDERMAL
Status: DISCONTINUED | OUTPATIENT
Start: 2023-09-05 | End: 2023-09-05 | Stop reason: HOSPADM

## 2023-09-05 RX ORDER — KETOROLAC TROMETHAMINE 30 MG/ML
INJECTION, SOLUTION INTRAMUSCULAR; INTRAVENOUS PRN
Status: DISCONTINUED | OUTPATIENT
Start: 2023-09-05 | End: 2023-09-05 | Stop reason: SDUPTHER

## 2023-09-05 RX ORDER — KETOROLAC TROMETHAMINE 15 MG/ML
15 INJECTION, SOLUTION INTRAMUSCULAR; INTRAVENOUS EVERY 6 HOURS
Status: DISCONTINUED | OUTPATIENT
Start: 2023-09-05 | End: 2023-09-05 | Stop reason: HOSPADM

## 2023-09-05 RX ORDER — DEXTROSE MONOHYDRATE 100 MG/ML
INJECTION, SOLUTION INTRAVENOUS CONTINUOUS PRN
Status: DISCONTINUED | OUTPATIENT
Start: 2023-09-05 | End: 2023-09-05 | Stop reason: HOSPADM

## 2023-09-05 RX ORDER — ACETAMINOPHEN 500 MG
1000 TABLET ORAL EVERY 6 HOURS PRN
Qty: 60 TABLET | Refills: 1 | Status: SHIPPED | OUTPATIENT
Start: 2023-09-05

## 2023-09-05 RX ORDER — INSULIN LISPRO 100 [IU]/ML
4 INJECTION, SOLUTION INTRAVENOUS; SUBCUTANEOUS ONCE
Status: COMPLETED | OUTPATIENT
Start: 2023-09-05 | End: 2023-09-05

## 2023-09-05 RX ORDER — ACETAMINOPHEN 500 MG
1000 TABLET ORAL EVERY 6 HOURS SCHEDULED
Status: DISCONTINUED | OUTPATIENT
Start: 2023-09-05 | End: 2023-09-05 | Stop reason: HOSPADM

## 2023-09-05 RX ORDER — OXYCODONE HYDROCHLORIDE 5 MG/1
5 TABLET ORAL EVERY 6 HOURS PRN
Qty: 28 TABLET | Refills: 0 | Status: SHIPPED | OUTPATIENT
Start: 2023-09-05 | End: 2023-09-12

## 2023-09-05 RX ORDER — SODIUM CHLORIDE 0.9 % (FLUSH) 0.9 %
5-40 SYRINGE (ML) INJECTION PRN
Status: DISCONTINUED | OUTPATIENT
Start: 2023-09-05 | End: 2023-09-05

## 2023-09-05 RX ORDER — SCOLOPAMINE TRANSDERMAL SYSTEM 1 MG/1
PATCH, EXTENDED RELEASE TRANSDERMAL
Status: DISCONTINUED
Start: 2023-09-05 | End: 2023-09-05 | Stop reason: HOSPADM

## 2023-09-05 RX ORDER — SIMETHICONE 80 MG
80 TABLET,CHEWABLE ORAL 4 TIMES DAILY PRN
Qty: 90 TABLET | Refills: 1 | Status: SHIPPED | OUTPATIENT
Start: 2023-09-05

## 2023-09-05 RX ORDER — MORPHINE SULFATE 2 MG/ML
1 INJECTION, SOLUTION INTRAMUSCULAR; INTRAVENOUS EVERY 5 MIN PRN
Status: DISCONTINUED | OUTPATIENT
Start: 2023-09-05 | End: 2023-09-05

## 2023-09-05 RX ORDER — LIDOCAINE HYDROCHLORIDE 10 MG/ML
INJECTION, SOLUTION INFILTRATION; PERINEURAL PRN
Status: DISCONTINUED | OUTPATIENT
Start: 2023-09-05 | End: 2023-09-05 | Stop reason: SDUPTHER

## 2023-09-05 RX ORDER — SODIUM CHLORIDE, SODIUM LACTATE, POTASSIUM CHLORIDE, CALCIUM CHLORIDE 600; 310; 30; 20 MG/100ML; MG/100ML; MG/100ML; MG/100ML
INJECTION, SOLUTION INTRAVENOUS CONTINUOUS
Status: DISCONTINUED | OUTPATIENT
Start: 2023-09-05 | End: 2023-09-05

## 2023-09-05 RX ORDER — SIMETHICONE 80 MG
80 TABLET,CHEWABLE ORAL 4 TIMES DAILY PRN
Qty: 90 TABLET | Refills: 1 | Status: SHIPPED | OUTPATIENT
Start: 2023-09-05 | End: 2023-09-05 | Stop reason: SDUPTHER

## 2023-09-05 RX ORDER — MEPERIDINE HYDROCHLORIDE 50 MG/ML
12.5 INJECTION INTRAMUSCULAR; INTRAVENOUS; SUBCUTANEOUS ONCE
Status: DISCONTINUED | OUTPATIENT
Start: 2023-09-05 | End: 2023-09-05

## 2023-09-05 RX ORDER — ROCURONIUM BROMIDE 10 MG/ML
INJECTION, SOLUTION INTRAVENOUS PRN
Status: DISCONTINUED | OUTPATIENT
Start: 2023-09-05 | End: 2023-09-05 | Stop reason: SDUPTHER

## 2023-09-05 RX ORDER — SODIUM CHLORIDE 0.9 % (FLUSH) 0.9 %
5-40 SYRINGE (ML) INJECTION EVERY 12 HOURS SCHEDULED
Status: DISCONTINUED | OUTPATIENT
Start: 2023-09-05 | End: 2023-09-05 | Stop reason: HOSPADM

## 2023-09-05 RX ORDER — FAMOTIDINE 10 MG/ML
INJECTION, SOLUTION INTRAVENOUS PRN
Status: DISCONTINUED | OUTPATIENT
Start: 2023-09-05 | End: 2023-09-05 | Stop reason: SDUPTHER

## 2023-09-05 RX ORDER — ENOXAPARIN SODIUM 100 MG/ML
40 INJECTION SUBCUTANEOUS 2 TIMES DAILY
Status: DISCONTINUED | OUTPATIENT
Start: 2023-09-06 | End: 2023-09-05 | Stop reason: HOSPADM

## 2023-09-05 RX ORDER — OXYCODONE HYDROCHLORIDE 5 MG/1
5 TABLET ORAL EVERY 4 HOURS PRN
Status: DISCONTINUED | OUTPATIENT
Start: 2023-09-05 | End: 2023-09-05 | Stop reason: HOSPADM

## 2023-09-05 RX ORDER — ONDANSETRON 2 MG/ML
4 INJECTION INTRAMUSCULAR; INTRAVENOUS EVERY 6 HOURS PRN
Status: DISCONTINUED | OUTPATIENT
Start: 2023-09-05 | End: 2023-09-05 | Stop reason: HOSPADM

## 2023-09-05 RX ORDER — GLUCAGON 1 MG/ML
1 KIT INJECTION PRN
Status: DISCONTINUED | OUTPATIENT
Start: 2023-09-05 | End: 2023-09-05 | Stop reason: HOSPADM

## 2023-09-05 RX ORDER — BUPIVACAINE HYDROCHLORIDE AND EPINEPHRINE 5; 5 MG/ML; UG/ML
INJECTION, SOLUTION EPIDURAL; INTRACAUDAL; PERINEURAL PRN
Status: DISCONTINUED | OUTPATIENT
Start: 2023-09-05 | End: 2023-09-05 | Stop reason: ALTCHOICE

## 2023-09-05 RX ORDER — FAMOTIDINE 20 MG/1
20 TABLET, FILM COATED ORAL 2 TIMES DAILY PRN
Status: DISCONTINUED | OUTPATIENT
Start: 2023-09-05 | End: 2023-09-05 | Stop reason: HOSPADM

## 2023-09-05 RX ORDER — OXYCODONE HYDROCHLORIDE 5 MG/1
5 TABLET ORAL PRN
Status: DISCONTINUED | OUTPATIENT
Start: 2023-09-05 | End: 2023-09-05

## 2023-09-05 RX ORDER — METOCLOPRAMIDE HYDROCHLORIDE 5 MG/ML
10 INJECTION INTRAMUSCULAR; INTRAVENOUS
Status: DISCONTINUED | OUTPATIENT
Start: 2023-09-05 | End: 2023-09-05

## 2023-09-05 RX ORDER — LABETALOL HYDROCHLORIDE 5 MG/ML
10 INJECTION, SOLUTION INTRAVENOUS
Status: DISCONTINUED | OUTPATIENT
Start: 2023-09-05 | End: 2023-09-05

## 2023-09-05 RX ORDER — SODIUM CHLORIDE 0.9 % (FLUSH) 0.9 %
5-40 SYRINGE (ML) INJECTION PRN
Status: DISCONTINUED | OUTPATIENT
Start: 2023-09-05 | End: 2023-09-05 | Stop reason: HOSPADM

## 2023-09-05 RX ORDER — SENNOSIDES A AND B 8.6 MG/1
1 TABLET, FILM COATED ORAL 2 TIMES DAILY
Qty: 60 TABLET | Refills: 1 | Status: SHIPPED | OUTPATIENT
Start: 2023-09-05 | End: 2023-11-04

## 2023-09-05 RX ORDER — PROMETHAZINE HYDROCHLORIDE 25 MG/1
12.5 TABLET ORAL EVERY 6 HOURS PRN
Status: DISCONTINUED | OUTPATIENT
Start: 2023-09-05 | End: 2023-09-05 | Stop reason: HOSPADM

## 2023-09-05 RX ORDER — PROCHLORPERAZINE EDISYLATE 5 MG/ML
10 INJECTION INTRAMUSCULAR; INTRAVENOUS EVERY 6 HOURS PRN
Status: DISCONTINUED | OUTPATIENT
Start: 2023-09-05 | End: 2023-09-05 | Stop reason: HOSPADM

## 2023-09-05 RX ORDER — MIDAZOLAM HYDROCHLORIDE 2 MG/2ML
2 INJECTION, SOLUTION INTRAMUSCULAR; INTRAVENOUS
Status: DISCONTINUED | OUTPATIENT
Start: 2023-09-05 | End: 2023-09-05

## 2023-09-05 RX ORDER — KETAMINE HCL IN NACL, ISO-OSM 100MG/10ML
SYRINGE (ML) INJECTION PRN
Status: DISCONTINUED | OUTPATIENT
Start: 2023-09-05 | End: 2023-09-05 | Stop reason: SDUPTHER

## 2023-09-05 RX ORDER — DIPHENHYDRAMINE HYDROCHLORIDE 50 MG/ML
12.5 INJECTION INTRAMUSCULAR; INTRAVENOUS
Status: DISCONTINUED | OUTPATIENT
Start: 2023-09-05 | End: 2023-09-05

## 2023-09-05 RX ORDER — HYDRALAZINE HYDROCHLORIDE 20 MG/ML
10 INJECTION INTRAMUSCULAR; INTRAVENOUS
Status: DISCONTINUED | OUTPATIENT
Start: 2023-09-05 | End: 2023-09-05

## 2023-09-05 RX ADMIN — ONDANSETRON 4 MG: 2 INJECTION INTRAMUSCULAR; INTRAVENOUS at 11:42

## 2023-09-05 RX ADMIN — INSULIN LISPRO 4 UNITS: 100 INJECTION, SOLUTION INTRAVENOUS; SUBCUTANEOUS at 15:00

## 2023-09-05 RX ADMIN — ROCURONIUM BROMIDE 10 MG: 10 INJECTION, SOLUTION INTRAVENOUS at 10:06

## 2023-09-05 RX ADMIN — SUGAMMADEX 200 MG: 100 INJECTION, SOLUTION INTRAVENOUS at 12:19

## 2023-09-05 RX ADMIN — ROCURONIUM BROMIDE 50 MG: 10 INJECTION, SOLUTION INTRAVENOUS at 08:42

## 2023-09-05 RX ADMIN — Medication 10 MG: at 09:33

## 2023-09-05 RX ADMIN — FENTANYL CITRATE 25 MCG: 50 INJECTION, SOLUTION INTRAMUSCULAR; INTRAVENOUS at 08:41

## 2023-09-05 RX ADMIN — FAMOTIDINE 20 MG: 10 INJECTION, SOLUTION INTRAVENOUS at 08:31

## 2023-09-05 RX ADMIN — FENTANYL CITRATE 25 MCG: 50 INJECTION, SOLUTION INTRAMUSCULAR; INTRAVENOUS at 12:06

## 2023-09-05 RX ADMIN — SODIUM CHLORIDE, POTASSIUM CHLORIDE, SODIUM LACTATE AND CALCIUM CHLORIDE: 600; 310; 30; 20 INJECTION, SOLUTION INTRAVENOUS at 11:44

## 2023-09-05 RX ADMIN — Medication 10 MG: at 11:04

## 2023-09-05 RX ADMIN — LIDOCAINE HYDROCHLORIDE 20 MG: 10 INJECTION, SOLUTION INFILTRATION; PERINEURAL at 08:41

## 2023-09-05 RX ADMIN — HYDROMORPHONE HYDROCHLORIDE 0.5 MG: 1 INJECTION, SOLUTION INTRAMUSCULAR; INTRAVENOUS; SUBCUTANEOUS at 12:59

## 2023-09-05 RX ADMIN — PROPOFOL 200 MG: 10 INJECTION, EMULSION INTRAVENOUS at 08:42

## 2023-09-05 RX ADMIN — ROCURONIUM BROMIDE 20 MG: 10 INJECTION, SOLUTION INTRAVENOUS at 11:15

## 2023-09-05 RX ADMIN — FENTANYL CITRATE 25 MCG: 50 INJECTION, SOLUTION INTRAMUSCULAR; INTRAVENOUS at 09:26

## 2023-09-05 RX ADMIN — Medication 10 MG: at 11:43

## 2023-09-05 RX ADMIN — Medication 0.5 MG: at 12:59

## 2023-09-05 RX ADMIN — DEXAMETHASONE SODIUM PHOSPHATE 10 MG: 10 INJECTION, SOLUTION INTRAMUSCULAR; INTRAVENOUS at 08:42

## 2023-09-05 RX ADMIN — KETOROLAC TROMETHAMINE 30 MG: 30 INJECTION, SOLUTION INTRAMUSCULAR; INTRAVENOUS at 12:05

## 2023-09-05 RX ADMIN — SODIUM CHLORIDE, POTASSIUM CHLORIDE, SODIUM LACTATE AND CALCIUM CHLORIDE: 600; 310; 30; 20 INJECTION, SOLUTION INTRAVENOUS at 08:21

## 2023-09-05 RX ADMIN — SODIUM CHLORIDE: 9 INJECTION, SOLUTION INTRAVENOUS at 14:52

## 2023-09-05 RX ADMIN — GENTAMICIN SULFATE 798.4 MG: 40 INJECTION, SOLUTION INTRAMUSCULAR; INTRAVENOUS at 08:46

## 2023-09-05 RX ADMIN — METRONIDAZOLE 500 MG: 500 SOLUTION INTRAVENOUS at 09:40

## 2023-09-05 RX ADMIN — INSULIN GLARGINE 4 UNITS: 100 INJECTION, SOLUTION SUBCUTANEOUS at 17:05

## 2023-09-05 RX ADMIN — INSULIN LISPRO 4 UNITS: 100 INJECTION, SOLUTION INTRAVENOUS; SUBCUTANEOUS at 17:42

## 2023-09-05 RX ADMIN — ROCURONIUM BROMIDE 30 MG: 10 INJECTION, SOLUTION INTRAVENOUS at 09:17

## 2023-09-05 RX ADMIN — FENTANYL CITRATE 50 MCG: 50 INJECTION, SOLUTION INTRAMUSCULAR; INTRAVENOUS at 08:42

## 2023-09-05 RX ADMIN — MIDAZOLAM 2 MG: 1 INJECTION INTRAMUSCULAR; INTRAVENOUS at 08:38

## 2023-09-05 RX ADMIN — ROCURONIUM BROMIDE 20 MG: 10 INJECTION, SOLUTION INTRAVENOUS at 09:53

## 2023-09-05 RX ADMIN — KETOROLAC TROMETHAMINE 15 MG: 15 INJECTION, SOLUTION INTRAMUSCULAR; INTRAVENOUS at 17:42

## 2023-09-05 RX ADMIN — SODIUM CHLORIDE, POTASSIUM CHLORIDE, SODIUM LACTATE AND CALCIUM CHLORIDE: 600; 310; 30; 20 INJECTION, SOLUTION INTRAVENOUS at 09:59

## 2023-09-05 RX ADMIN — Medication 20 MG: at 09:12

## 2023-09-05 RX ADMIN — OXYCODONE HYDROCHLORIDE 10 MG: 5 TABLET ORAL at 14:57

## 2023-09-05 RX ADMIN — ALBUTEROL SULFATE 4 PUFF: 90 AEROSOL, METERED RESPIRATORY (INHALATION) at 09:00

## 2023-09-05 ASSESSMENT — PAIN DESCRIPTION - LOCATION
LOCATION: ABDOMEN

## 2023-09-05 ASSESSMENT — PAIN SCALES - GENERAL
PAINLEVEL_OUTOF10: 8
PAINLEVEL_OUTOF10: 7
PAINLEVEL_OUTOF10: 5
PAINLEVEL_OUTOF10: 4

## 2023-09-05 ASSESSMENT — PAIN - FUNCTIONAL ASSESSMENT: PAIN_FUNCTIONAL_ASSESSMENT: 0-10

## 2023-09-05 ASSESSMENT — PAIN DESCRIPTION - DESCRIPTORS
DESCRIPTORS: ACHING;SHARP
DESCRIPTORS: ACHING;SORE

## 2023-09-05 NOTE — BRIEF OP NOTE
Brief Operative Note  Department of Obstetrics and Gynecology  [address]     Patient: Michael Rosenberg   : 1985  MRN: [id number]       Acct: [de-identified]   Date of Procedure: 23     Pre-operative Diagnosis: 45 y.o. female [id number]    EIN/complex  Abnormal Uterine Bleeding  Endometrial Thickening  Enlarged Uterus  Type 2 Diabetes Mellitus   History  x2  History of Laparoscopic Cholecystectomy  PCOS  Chronic Low Back Pain  Chronic pain of left knee  BMI 55     Post-operative Diagnosis: Same as above. Bladder adhesions to the anterior uterine surface. Abdominal omental adhesions. Procedure: Robotic assisted laparoscopic hysterectomy, bilateral salpingectomy, cystoscopy    Surgeon: Judy Mercado DO     Assistant(s): Emily Zhao DO, PGY4; Thaddeus Jacobson, MS4    Anesthesia: general via ET tube    Findings: On bimanual exam 11cm uterus, anteverted and mobile with no adnexal fullness bilaterally. Normal external genitalia without lesions. Vaginal mucosa with redundant tissue anteriorly and posteriorly. Normal appearing cervix without bleeding or lesions. Omental adhesions to anterior abdominal wall. Bladder adhesions to anterior uterine surface. Bilateral ureteral jets appreciated. Normal bladder wall. Total IV fluids/Blood products:  1400 ml  Urine Output:  350 ml    Estimated blood loss:  100 ml  Drains:  jones catheter  Specimens:  Uterus, cervix, bilateral fallopian tubes  Instrument and Sponge Count: Correct  Complications:  none  Condition:  stable, transferred to post anesthesia recovery    See full operative report for further details.     Emily Zhao DO  Ob/Gyn Resident  2023, 8:14 AM           Date: 2023  Time: 7:51 PM    Patient Name: Michael Rosenberg  Patient : 1985  Room/Bed: 327/327-01  Admission Date/Time: 2023  6:12 AM  MRN #: [id number]  [facility] #: [id number]      Attending Attestation:   I was present and scrubbed for the entire

## 2023-09-05 NOTE — OP NOTE
the cardinal ligament. Then in a similar fashion, the  right uterine arteries were skeletonized, cauterized and ligated at the cervical isthmus and a subsequent bite was taken, parallel to the cervix at the cardinal ligament. Anterior colpotomy was made circumferentially to the posterior aspect, amputating the cervix from the vagina without any complications. Then the uterus and cervix were removed en bloc vaginally without any complications. Bilateral fallopian tubes were also removed. The vaginal cuff was then closed using Figure of 8 sutures with 0 Vicryl, being careful to incorporate both uterosacral ligaments in the corners and the vaginal mucosa into each suture. The pelvis was then irrigated/suctioned and excellent hemostasis was noted over the vaginal cuff, which was palpated vaginally. Simultaneously, the cuff was also visualized laparoscopically and noted to be intact and well approximated. Surgicel was then applied to the cuff for additional hemostasis. Amnioband was placed over the cuff for optimized healing. All pedicles were inspected with excellent hemostasis noted. There was no other intra-abdominal pathology. Rosales catheter was removed. A cystoscope was then inserted into the urethra and bilateral ureteral jets were noted at the insertion of the ureters to the trigone of the bladder. A full inspection of the bladder showed no sutures in the bladder. The cytoscope was then removed. All instruments were removed from the abdomen under direct visualization. The daVinci was undocked and removed from the operative field. All CO2 was removed from the patient's abdomen and trocars were removed. The skin was closed using 4-0 Monocryl in the subcuticular fashion and covered with Dermabond. Sponge, lap, needle count, and instrument counts were correct x 2. The patient was extubated and taken to the post operative recovery unit in good condition.   The patient received postopertaive IV Toradol for further analgesia. Dr. Krys Conte was present for the entire operation. Findings: On bimanual exam 11cm uterus, anteverted and mobile with no adnexal fullness bilaterally. Normal external genitalia without lesions. Vaginal mucosa with redundant tissue anteriorly and posteriorly. Normal appearing cervix without bleeding or lesions. Omental adhesions to anterior abdominal wall. Bladder adhesions to anterior uterine surface. Bilateral ureteral jets appreciated. Normal bladder wall. Total IV fluids/Blood products:  1400 ml  Urine Output:  350 ml    Estimated blood loss:  100 ml  Drains:  jones catheter  Specimens:  Uterus, cervix, bilateral fallopian tubes  Instrument and Sponge Count: Correct  Complications:  none  Condition:  stable, transferred to post anesthesia recovery    [facility] DO Evelio  Ob/Gyn Resident  2023, 2:56 PM         Date: 2023  Time: 7:54 PM    Patient Name: Reji Gomez  Patient : 1985  Room/Bed: [facility]/327-01  Admission Date/Time: 2023  6:12 AM  MRN #: [id number]  [facility] #: [id number]      Attending Attestation:   I was present and scrubbed for the entire procedure.     Attending Name:  Evans Arroyo DO    Electronically signed by Evans Arroyo DO on 2023 at 7:54 PM

## 2023-09-06 PROBLEM — Z90.710 H/O: HYSTERECTOMY: Status: ACTIVE | Noted: 2023-09-06

## 2023-09-07 ENCOUNTER — TELEPHONE (OUTPATIENT)
Dept: OBGYN CLINIC | Age: 38
End: 2023-09-07

## 2023-09-07 NOTE — TELEPHONE ENCOUNTER
Dr powell stated patient was having elevated blood sugars after her surgery and was worried about control and healing after surgery- did reach out to pcp who stated to have pt call her office to schedule an appointment as she will be happy to assist in any way.      Left detailed message on pt vm explaining everything and to call her pcp for an appointment to help control glucose

## 2023-09-08 ENCOUNTER — TELEPHONE (OUTPATIENT)
Dept: FAMILY MEDICINE CLINIC | Age: 38
End: 2023-09-08

## 2023-09-08 LAB — SURGICAL PATHOLOGY REPORT: NORMAL

## 2023-09-08 RX ORDER — INSULIN GLARGINE 100 [IU]/ML
20 INJECTION, SOLUTION SUBCUTANEOUS NIGHTLY
Qty: 5 ADJUSTABLE DOSE PRE-FILLED PEN SYRINGE | Refills: 0 | Status: SHIPPED | OUTPATIENT
Start: 2023-09-08

## 2023-09-08 NOTE — TELEPHONE ENCOUNTER
Patient called in stating that she had a partial hysterectomy on 9/5 by Dr. Addy Patiño. Her glucose level has ranged between 190-250 and Dr. Addy Patiño wants it to be 180 or lower. She said he told her to call her PCP to see if anything good be sent in to help with this.

## 2023-09-10 ENCOUNTER — TELEPHONE (OUTPATIENT)
Dept: PRIMARY CARE CLINIC | Age: 38
End: 2023-09-10

## 2023-09-10 RX ORDER — FLUCONAZOLE 150 MG/1
150 TABLET ORAL ONCE
Qty: 1 TABLET | Refills: 0 | Status: SHIPPED | OUTPATIENT
Start: 2023-09-10 | End: 2023-09-10

## 2023-09-11 ENCOUNTER — HOSPITAL ENCOUNTER (OUTPATIENT)
Age: 38
Setting detail: SPECIMEN
Discharge: HOME | End: 2023-09-11

## 2023-09-11 ENCOUNTER — TELEPHONE (OUTPATIENT)
Dept: OBGYN CLINIC | Age: 38
End: 2023-09-11

## 2023-09-11 ENCOUNTER — OFFICE VISIT (OUTPATIENT)
Dept: PRIMARY CARE CLINIC | Age: 38
End: 2023-09-11
Payer: COMMERCIAL

## 2023-09-11 VITALS
WEIGHT: 293 LBS | DIASTOLIC BLOOD PRESSURE: 84 MMHG | BODY MASS INDEX: 45.99 KG/M2 | OXYGEN SATURATION: 95 % | RESPIRATION RATE: 14 BRPM | SYSTOLIC BLOOD PRESSURE: 134 MMHG | HEART RATE: 90 BPM | HEIGHT: 67 IN

## 2023-09-11 DIAGNOSIS — E11.9 TYPE 2 DIABETES MELLITUS WITHOUT COMPLICATION, WITHOUT LONG-TERM CURRENT USE OF INSULIN (HCC): Primary | ICD-10-CM

## 2023-09-11 DIAGNOSIS — R30.0 DYSURIA: ICD-10-CM

## 2023-09-11 PROCEDURE — 1036F TOBACCO NON-USER: CPT | Performed by: NURSE PRACTITIONER

## 2023-09-11 PROCEDURE — G8427 DOCREV CUR MEDS BY ELIG CLIN: HCPCS | Performed by: NURSE PRACTITIONER

## 2023-09-11 PROCEDURE — 2022F DILAT RTA XM EVC RTNOPTHY: CPT | Performed by: NURSE PRACTITIONER

## 2023-09-11 PROCEDURE — 3052F HG A1C>EQUAL 8.0%<EQUAL 9.0%: CPT | Performed by: NURSE PRACTITIONER

## 2023-09-11 PROCEDURE — G8417 CALC BMI ABV UP PARAM F/U: HCPCS | Performed by: NURSE PRACTITIONER

## 2023-09-11 PROCEDURE — 99214 OFFICE O/P EST MOD 30 MIN: CPT | Performed by: NURSE PRACTITIONER

## 2023-09-11 RX ORDER — FLUCONAZOLE 150 MG/1
150 TABLET ORAL ONCE
Qty: 1 TABLET | Refills: 0 | Status: SHIPPED | OUTPATIENT
Start: 2023-09-11 | End: 2023-09-11

## 2023-09-11 RX ORDER — ACYCLOVIR 400 MG/1
1 TABLET ORAL
Qty: 2 EACH | Refills: 1 | Status: SHIPPED | OUTPATIENT
Start: 2023-09-11

## 2023-09-11 ASSESSMENT — ENCOUNTER SYMPTOMS
SHORTNESS OF BREATH: 0
ABDOMINAL PAIN: 0
BACK PAIN: 0
COUGH: 0

## 2023-09-11 ASSESSMENT — PATIENT HEALTH QUESTIONNAIRE - PHQ9
9. THOUGHTS THAT YOU WOULD BE BETTER OFF DEAD, OR OF HURTING YOURSELF: 0
SUM OF ALL RESPONSES TO PHQ QUESTIONS 1-9: 0
1. LITTLE INTEREST OR PLEASURE IN DOING THINGS: 0
3. TROUBLE FALLING OR STAYING ASLEEP: 0
2. FEELING DOWN, DEPRESSED OR HOPELESS: 0
8. MOVING OR SPEAKING SO SLOWLY THAT OTHER PEOPLE COULD HAVE NOTICED. OR THE OPPOSITE, BEING SO FIGETY OR RESTLESS THAT YOU HAVE BEEN MOVING AROUND A LOT MORE THAN USUAL: 0
4. FEELING TIRED OR HAVING LITTLE ENERGY: 0
SUM OF ALL RESPONSES TO PHQ QUESTIONS 1-9: 0
SUM OF ALL RESPONSES TO PHQ QUESTIONS 1-9: 0
7. TROUBLE CONCENTRATING ON THINGS, SUCH AS READING THE NEWSPAPER OR WATCHING TELEVISION: 0
SUM OF ALL RESPONSES TO PHQ QUESTIONS 1-9: 0
10. IF YOU CHECKED OFF ANY PROBLEMS, HOW DIFFICULT HAVE THESE PROBLEMS MADE IT FOR YOU TO DO YOUR WORK, TAKE CARE OF THINGS AT HOME, OR GET ALONG WITH OTHER PEOPLE: 0
5. POOR APPETITE OR OVEREATING: 0
6. FEELING BAD ABOUT YOURSELF - OR THAT YOU ARE A FAILURE OR HAVE LET YOURSELF OR YOUR FAMILY DOWN: 0
SUM OF ALL RESPONSES TO PHQ9 QUESTIONS 1 & 2: 0

## 2023-09-11 NOTE — TELEPHONE ENCOUNTER
Spoke with patient and informed patient of all recommendations and information provided by dr Kuldip Garrison will be called in- pt is seeing her PCP today for her glucose control.

## 2023-09-11 NOTE — TELEPHONE ENCOUNTER
----- Message from Saniya Pabon DO sent at 9/10/2023 11:19 AM EDT -----  Regarding: RE: Help controlling patient glucose after surgery  I was reviewing her chart and she is concerned about yeast infection. This is probably from her glucose being out of control  She is at a very high risk of wound breakdown, severe complication, infection, etc.  Also Tabbie can you please follow up with ASAP and if she is still having irritation and itching/yeast concerns make sure she gets diflucan and we have a log of her glucose with meals and before bed. Thank you. Roost  ----- Message -----  From: Karla Quiñones  Sent: 9/7/2023   1:31 PM EDT  To: Saniya Pabon DO; #  Subject: Help controlling patient glucose after surge#    Hi Messiivangil Fraire, my name is Angel Luis RUCKER/Surgery Scheduler with Dr Addy Patiño office. We have a mutual patient named Meg Hoffmann. Patient recently had surgery with Dr Addy Patiño and has been reporting to him glucose readings of 180-200's. Dr Addy Patiño is wondering if you would be able to help assist in controlling her glucose as she recovers from surgery.      Thank you  Angel Luis RUCKER/Surgery Scheduler

## 2023-09-13 DIAGNOSIS — A49.9 ESBL (EXTENDED SPECTRUM BETA-LACTAMASE) PRODUCING BACTERIA INFECTION: Primary | ICD-10-CM

## 2023-09-13 DIAGNOSIS — Z16.12 ESBL (EXTENDED SPECTRUM BETA-LACTAMASE) PRODUCING BACTERIA INFECTION: Primary | ICD-10-CM

## 2023-09-14 LAB
MICROORGANISM SPEC CULT: ABNORMAL
MICROORGANISM SPEC CULT: ABNORMAL
SPECIMEN DESCRIPTION: ABNORMAL

## 2023-09-26 ENCOUNTER — OFFICE VISIT (OUTPATIENT)
Dept: OBGYN CLINIC | Age: 38
End: 2023-09-26

## 2023-09-26 VITALS
SYSTOLIC BLOOD PRESSURE: 122 MMHG | DIASTOLIC BLOOD PRESSURE: 74 MMHG | WEIGHT: 293 LBS | HEIGHT: 67 IN | BODY MASS INDEX: 45.99 KG/M2

## 2023-09-26 DIAGNOSIS — Z90.710 H/O: HYSTERECTOMY: Primary | ICD-10-CM

## 2023-09-26 PROCEDURE — 99024 POSTOP FOLLOW-UP VISIT: CPT | Performed by: OBSTETRICS & GYNECOLOGY

## 2023-09-26 NOTE — PROGRESS NOTES
Kaitlynn Babin  9/26/2023  3:37 PM    Kaitlynn Babin  Procedure:    Diagnosis Orders   1. SHIRAROSANACAMI, Cysto 9/5/23           Kaitlynn Babin is a 45 y.o. female [id number]    The patient was seen, she denies any complaints. She denied any shortness of breath, chest pain or dizziness. She denied any nausea, vomiting, or diarrhea. There is no fever, chills, or rigors. The patient denies any vaginal bleeding, discharge or odor. All of her pre-operative complaints are now resolved. Blood pressure 122/74, height 5' 7\" (1.702 m), weight (!) 355 lb (161 kg), last menstrual period 07/21/2023, not currently breastfeeding. Abdominal Exam: soft non-tender. Good bowel sounds. No guarding, rebound or rigidity. No costal vertebral angle tenderness bilateral. No hernias    Incision: healing well, no drainage, no erythema, well approximated    Extremities: No edema or calf pain noted bilaterally. Pelvic Exam:Exam deferred. Results for orders placed or performed during the hospital encounter of 09/11/23   Culture, Urine    Specimen: Urine, clean catch   Result Value Ref Range    Specimen Description . CLEAN CATCH URINE     Culture (A)      ESCHERICHIA COLI >100,000 CFU/ML This organism is an Extended Spectrum Beta Lactamase (ESBL)  and resistance to therapy with Penicillins, Cephalosporins and Aztreonam is expected. These organisms generally remain susceptible to Carbapenems. Consider ID Consultation. CONTACT PRECAUTIONS INDICATED. Culture (A)      STREPTOCOCCI, BETA HEMOLYTIC GROUP B >100,000 CFU/ML       Susceptibility    Escherichia coli - BACTERIAL SUSCEPTIBILITY PANEL NEGRA     ampicillin >=32 Resistant      ceFAZolin* >=64 Resistant       * Cefazolin sensitivity results can be used to predict the effectiveness of oral   cephalosporins (eg.  Cephalexin) in uncomplicated Urinary Tract Infections due to E. coli, K.   pneumoniae, and P. mirabilis       cefepime  Resistant      cefTRIAXone

## 2023-10-05 PROBLEM — Z98.890 POST-OPERATIVE STATE: Status: RESOLVED | Noted: 2023-09-05 | Resolved: 2023-10-05

## 2023-10-13 DIAGNOSIS — E11.9 TYPE 2 DIABETES MELLITUS WITHOUT COMPLICATION, WITHOUT LONG-TERM CURRENT USE OF INSULIN (HCC): ICD-10-CM

## 2023-10-13 RX ORDER — INSULIN GLARGINE-YFGN 100 [IU]/ML
20 INJECTION, SOLUTION SUBCUTANEOUS
Qty: 15 ML | Refills: 0 | Status: SHIPPED | OUTPATIENT
Start: 2023-10-13

## 2023-10-13 RX ORDER — ACYCLOVIR 400 MG/1
TABLET ORAL
Qty: 3 EACH | Refills: 0 | Status: SHIPPED | OUTPATIENT
Start: 2023-10-13

## 2023-10-13 NOTE — TELEPHONE ENCOUNTER
Last Visit Date: 9/11/2023   Next Visit Date: 10/27/2023 What Is The Reason For Today's Visit?: Mole Check

## 2023-10-25 DIAGNOSIS — M54.41 CHRONIC LOW BACK PAIN WITH BILATERAL SCIATICA, UNSPECIFIED BACK PAIN LATERALITY: ICD-10-CM

## 2023-10-25 DIAGNOSIS — N92.1 MENORRHAGIA WITH IRREGULAR CYCLE: ICD-10-CM

## 2023-10-25 DIAGNOSIS — M54.42 CHRONIC LOW BACK PAIN WITH BILATERAL SCIATICA, UNSPECIFIED BACK PAIN LATERALITY: ICD-10-CM

## 2023-10-25 DIAGNOSIS — G89.29 CHRONIC LOW BACK PAIN WITH BILATERAL SCIATICA, UNSPECIFIED BACK PAIN LATERALITY: ICD-10-CM

## 2023-10-25 RX ORDER — TIZANIDINE 4 MG/1
4 TABLET ORAL 3 TIMES DAILY
Qty: 90 TABLET | Refills: 0 | Status: SHIPPED | OUTPATIENT
Start: 2023-10-25

## 2023-10-25 RX ORDER — LIDOCAINE 50 MG/G
PATCH TOPICAL
Qty: 30 PATCH | Refills: 0 | Status: SHIPPED | OUTPATIENT
Start: 2023-10-25

## 2023-10-25 RX ORDER — DROSPIRENONE 4 MG/1
1 TABLET, FILM COATED ORAL DAILY
Qty: 28 TABLET | Refills: 0 | OUTPATIENT
Start: 2023-10-25

## 2023-10-27 ENCOUNTER — OFFICE VISIT (OUTPATIENT)
Dept: PRIMARY CARE CLINIC | Age: 38
End: 2023-10-27
Payer: COMMERCIAL

## 2023-10-27 VITALS
SYSTOLIC BLOOD PRESSURE: 124 MMHG | HEIGHT: 67 IN | HEART RATE: 84 BPM | BODY MASS INDEX: 45.99 KG/M2 | DIASTOLIC BLOOD PRESSURE: 76 MMHG | OXYGEN SATURATION: 97 % | RESPIRATION RATE: 15 BRPM | WEIGHT: 293 LBS

## 2023-10-27 DIAGNOSIS — N39.0 URINARY TRACT INFECTION WITHOUT HEMATURIA, SITE UNSPECIFIED: ICD-10-CM

## 2023-10-27 DIAGNOSIS — E11.9 TYPE 2 DIABETES MELLITUS WITHOUT COMPLICATION, WITHOUT LONG-TERM CURRENT USE OF INSULIN (HCC): Primary | ICD-10-CM

## 2023-10-27 LAB — HBA1C MFR BLD: 8.2 %

## 2023-10-27 PROCEDURE — 99214 OFFICE O/P EST MOD 30 MIN: CPT | Performed by: NURSE PRACTITIONER

## 2023-10-27 PROCEDURE — G8417 CALC BMI ABV UP PARAM F/U: HCPCS | Performed by: NURSE PRACTITIONER

## 2023-10-27 PROCEDURE — G8427 DOCREV CUR MEDS BY ELIG CLIN: HCPCS | Performed by: NURSE PRACTITIONER

## 2023-10-27 PROCEDURE — G8484 FLU IMMUNIZE NO ADMIN: HCPCS | Performed by: NURSE PRACTITIONER

## 2023-10-27 PROCEDURE — 83036 HEMOGLOBIN GLYCOSYLATED A1C: CPT | Performed by: NURSE PRACTITIONER

## 2023-10-27 PROCEDURE — 2022F DILAT RTA XM EVC RTNOPTHY: CPT | Performed by: NURSE PRACTITIONER

## 2023-10-27 PROCEDURE — 1036F TOBACCO NON-USER: CPT | Performed by: NURSE PRACTITIONER

## 2023-10-27 PROCEDURE — 3052F HG A1C>EQUAL 8.0%<EQUAL 9.0%: CPT | Performed by: NURSE PRACTITIONER

## 2023-10-27 SDOH — ECONOMIC STABILITY: INCOME INSECURITY: HOW HARD IS IT FOR YOU TO PAY FOR THE VERY BASICS LIKE FOOD, HOUSING, MEDICAL CARE, AND HEATING?: NOT HARD AT ALL

## 2023-10-27 SDOH — ECONOMIC STABILITY: FOOD INSECURITY: WITHIN THE PAST 12 MONTHS, YOU WORRIED THAT YOUR FOOD WOULD RUN OUT BEFORE YOU GOT MONEY TO BUY MORE.: NEVER TRUE

## 2023-10-27 SDOH — ECONOMIC STABILITY: FOOD INSECURITY: WITHIN THE PAST 12 MONTHS, THE FOOD YOU BOUGHT JUST DIDN'T LAST AND YOU DIDN'T HAVE MONEY TO GET MORE.: NEVER TRUE

## 2023-10-27 ASSESSMENT — PATIENT HEALTH QUESTIONNAIRE - PHQ9
2. FEELING DOWN, DEPRESSED OR HOPELESS: 0
SUM OF ALL RESPONSES TO PHQ QUESTIONS 1-9: 0
8. MOVING OR SPEAKING SO SLOWLY THAT OTHER PEOPLE COULD HAVE NOTICED. OR THE OPPOSITE, BEING SO FIGETY OR RESTLESS THAT YOU HAVE BEEN MOVING AROUND A LOT MORE THAN USUAL: 0
SUM OF ALL RESPONSES TO PHQ QUESTIONS 1-9: 0
1. LITTLE INTEREST OR PLEASURE IN DOING THINGS: 0
5. POOR APPETITE OR OVEREATING: 0
3. TROUBLE FALLING OR STAYING ASLEEP: 0
SUM OF ALL RESPONSES TO PHQ QUESTIONS 1-9: 0
9. THOUGHTS THAT YOU WOULD BE BETTER OFF DEAD, OR OF HURTING YOURSELF: 0
SUM OF ALL RESPONSES TO PHQ QUESTIONS 1-9: 0
SUM OF ALL RESPONSES TO PHQ9 QUESTIONS 1 & 2: 0
4. FEELING TIRED OR HAVING LITTLE ENERGY: 0
10. IF YOU CHECKED OFF ANY PROBLEMS, HOW DIFFICULT HAVE THESE PROBLEMS MADE IT FOR YOU TO DO YOUR WORK, TAKE CARE OF THINGS AT HOME, OR GET ALONG WITH OTHER PEOPLE: 0
7. TROUBLE CONCENTRATING ON THINGS, SUCH AS READING THE NEWSPAPER OR WATCHING TELEVISION: 0
6. FEELING BAD ABOUT YOURSELF - OR THAT YOU ARE A FAILURE OR HAVE LET YOURSELF OR YOUR FAMILY DOWN: 0

## 2023-10-27 ASSESSMENT — COLUMBIA-SUICIDE SEVERITY RATING SCALE - C-SSRS
7. DID THIS OCCUR IN THE LAST THREE MONTHS: NO
3. HAVE YOU BEEN THINKING ABOUT HOW YOU MIGHT KILL YOURSELF?: NO
4. HAVE YOU HAD THESE THOUGHTS AND HAD SOME INTENTION OF ACTING ON THEM?: NO
5. HAVE YOU STARTED TO WORK OUT OR WORKED OUT THE DETAILS OF HOW TO KILL YOURSELF? DO YOU INTEND TO CARRY OUT THIS PLAN?: NO

## 2023-10-27 ASSESSMENT — ENCOUNTER SYMPTOMS
COUGH: 0
ABDOMINAL PAIN: 0
BACK PAIN: 0
SHORTNESS OF BREATH: 0

## 2023-10-27 NOTE — PROGRESS NOTES
of prescribed medications. All patientquestions answered. Pt voiced understanding. Reviewed health maintenance. Instructedto continue current medications, diet and exercise. Patient agreed with treatmentplan. Follow up as directed.      Electronicallysigned by CLARY Villagomez CNP on 10/27/2023 at 12:10 PM

## 2023-10-31 ENCOUNTER — OFFICE VISIT (OUTPATIENT)
Dept: OBGYN CLINIC | Age: 38
End: 2023-10-31

## 2023-10-31 VITALS — WEIGHT: 293 LBS | BODY MASS INDEX: 55.91 KG/M2 | DIASTOLIC BLOOD PRESSURE: 78 MMHG | SYSTOLIC BLOOD PRESSURE: 122 MMHG

## 2023-10-31 DIAGNOSIS — D49.2 ABNORMAL SKIN GROWTH: ICD-10-CM

## 2023-10-31 DIAGNOSIS — Z90.710 H/O: HYSTERECTOMY: Primary | ICD-10-CM

## 2023-10-31 PROCEDURE — 99024 POSTOP FOLLOW-UP VISIT: CPT | Performed by: OBSTETRICS & GYNECOLOGY

## 2023-11-01 NOTE — PROGRESS NOTES
examination performed. Processing Lab:  [address] [facility],   [facility], [address]   Interpretation Performed at [address], [facility], [address] [facility]         Patient Name: Callie Mejiats: [id number]   [address] [place] [address] Street   [address] [facility], [address] [facility]   [phone]   Fax: [phone]         Assessment:      Diagnosis Orders   1. RALH, BS, Cysto 23         2.  Abnormal skin growth  Isabel Whitfield DO, General Surgery, [place]           Patient Active Problem List    Diagnosis Date Noted    RALH, BS, Cysto 2023    Endometrial hyperplasia, complex 2023    Other specified personal risk factors, not elsewhere classified 2023    Encounter for prophylactic surgery for risk factor related to malignant neoplasm of ovary 2023    S/p hscope, D&C, Myosure 2023    Thickened endometrium 2023    Depressive disorder 2021    Pars defect of lumbar spine 2020    Chronic pain of left knee 2020    Bipolar disorder ([address]) 2020    Migraine 2020    Type 2 diabetes mellitus without complication, without long-term current use of insulin ([address]) 2020    Menorrhagia with irregular cycle 2020    Endometrial thickening on ultrasound 2020    Bulky or enlarged uterus 2020    Migraine without aura and without status migrainosus, not intractable 2020    Anxiety 2020    Hx laparoscopic cholecystectomy 2020    Hx of  section 2020    History of third molar tooth extraction 2020    PCOS (polycystic ovarian syndrome) 2020    Family history of diabetes mellitus (DM) 2020    Family history of colon cancer in father 2020    Chronic low back pain 2020          POD# 8 weeks   Procedure: see above   Stable   Pathology reviewed

## 2023-11-02 ENCOUNTER — TELEPHONE (OUTPATIENT)
Dept: SURGERY | Age: 38
End: 2023-11-02

## 2023-11-07 ENCOUNTER — TELEPHONE (OUTPATIENT)
Dept: SURGERY | Age: 38
End: 2023-11-07

## 2023-11-16 DIAGNOSIS — E11.9 TYPE 2 DIABETES MELLITUS WITHOUT COMPLICATION, WITHOUT LONG-TERM CURRENT USE OF INSULIN (HCC): ICD-10-CM

## 2023-11-16 RX ORDER — ACYCLOVIR 400 MG/1
TABLET ORAL
Qty: 3 EACH | Refills: 0 | Status: SHIPPED | OUTPATIENT
Start: 2023-11-16

## 2023-12-15 DIAGNOSIS — E11.9 TYPE 2 DIABETES MELLITUS WITHOUT COMPLICATION, WITHOUT LONG-TERM CURRENT USE OF INSULIN (HCC): ICD-10-CM

## 2023-12-15 RX ORDER — ACYCLOVIR 400 MG/1
TABLET ORAL
Qty: 3 EACH | Refills: 0 | Status: SHIPPED | OUTPATIENT
Start: 2023-12-15

## 2024-01-18 DIAGNOSIS — F41.9 ANXIETY: Primary | ICD-10-CM

## 2024-01-27 DIAGNOSIS — G43.009 MIGRAINE WITHOUT AURA AND WITHOUT STATUS MIGRAINOSUS, NOT INTRACTABLE: ICD-10-CM

## 2024-01-27 RX ORDER — SUMATRIPTAN 100 MG/1
TABLET, FILM COATED ORAL
Qty: 27 TABLET | Refills: 0 | Status: SHIPPED | OUTPATIENT
Start: 2024-01-27

## 2024-01-28 ASSESSMENT — PATIENT HEALTH QUESTIONNAIRE - PHQ9
7. TROUBLE CONCENTRATING ON THINGS, SUCH AS READING THE NEWSPAPER OR WATCHING TELEVISION: SEVERAL DAYS
2. FEELING DOWN, DEPRESSED OR HOPELESS: 1
6. FEELING BAD ABOUT YOURSELF - OR THAT YOU ARE A FAILURE OR HAVE LET YOURSELF OR YOUR FAMILY DOWN: NOT AT ALL
3. TROUBLE FALLING OR STAYING ASLEEP: 1
9. THOUGHTS THAT YOU WOULD BE BETTER OFF DEAD, OR OF HURTING YOURSELF: NOT AT ALL
10. IF YOU CHECKED OFF ANY PROBLEMS, HOW DIFFICULT HAVE THESE PROBLEMS MADE IT FOR YOU TO DO YOUR WORK, TAKE CARE OF THINGS AT HOME, OR GET ALONG WITH OTHER PEOPLE: NOT DIFFICULT AT ALL
4. FEELING TIRED OR HAVING LITTLE ENERGY: 1
8. MOVING OR SPEAKING SO SLOWLY THAT OTHER PEOPLE COULD HAVE NOTICED. OR THE OPPOSITE - BEING SO FIDGETY OR RESTLESS THAT YOU HAVE BEEN MOVING AROUND A LOT MORE THAN USUAL: NOT AT ALL
5. POOR APPETITE OR OVEREATING: NOT AT ALL
SUM OF ALL RESPONSES TO PHQ QUESTIONS 1-9: 5
SUM OF ALL RESPONSES TO PHQ9 QUESTIONS 1 & 2: 2
2. FEELING DOWN, DEPRESSED OR HOPELESS: SEVERAL DAYS
SUM OF ALL RESPONSES TO PHQ QUESTIONS 1-9: 5
9. THOUGHTS THAT YOU WOULD BE BETTER OFF DEAD, OR OF HURTING YOURSELF: 0
5. POOR APPETITE OR OVEREATING: 0
SUM OF ALL RESPONSES TO PHQ QUESTIONS 1-9: 5
1. LITTLE INTEREST OR PLEASURE IN DOING THINGS: SEVERAL DAYS
4. FEELING TIRED OR HAVING LITTLE ENERGY: SEVERAL DAYS
3. TROUBLE FALLING OR STAYING ASLEEP: SEVERAL DAYS
SUM OF ALL RESPONSES TO PHQ QUESTIONS 1-9: 5
10. IF YOU CHECKED OFF ANY PROBLEMS, HOW DIFFICULT HAVE THESE PROBLEMS MADE IT FOR YOU TO DO YOUR WORK, TAKE CARE OF THINGS AT HOME, OR GET ALONG WITH OTHER PEOPLE: 0
7. TROUBLE CONCENTRATING ON THINGS, SUCH AS READING THE NEWSPAPER OR WATCHING TELEVISION: 1
8. MOVING OR SPEAKING SO SLOWLY THAT OTHER PEOPLE COULD HAVE NOTICED. OR THE OPPOSITE, BEING SO FIGETY OR RESTLESS THAT YOU HAVE BEEN MOVING AROUND A LOT MORE THAN USUAL: 0
6. FEELING BAD ABOUT YOURSELF - OR THAT YOU ARE A FAILURE OR HAVE LET YOURSELF OR YOUR FAMILY DOWN: 0
1. LITTLE INTEREST OR PLEASURE IN DOING THINGS: 1
SUM OF ALL RESPONSES TO PHQ QUESTIONS 1-9: 5

## 2024-01-29 ENCOUNTER — OFFICE VISIT (OUTPATIENT)
Dept: PRIMARY CARE CLINIC | Age: 39
End: 2024-01-29
Payer: COMMERCIAL

## 2024-01-29 VITALS
WEIGHT: 293 LBS | RESPIRATION RATE: 15 BRPM | DIASTOLIC BLOOD PRESSURE: 84 MMHG | BODY MASS INDEX: 45.99 KG/M2 | HEIGHT: 67 IN | HEART RATE: 80 BPM | OXYGEN SATURATION: 97 % | SYSTOLIC BLOOD PRESSURE: 128 MMHG

## 2024-01-29 DIAGNOSIS — E11.9 TYPE 2 DIABETES MELLITUS WITHOUT COMPLICATION, WITHOUT LONG-TERM CURRENT USE OF INSULIN (HCC): Primary | ICD-10-CM

## 2024-01-29 DIAGNOSIS — J06.9 UPPER RESPIRATORY TRACT INFECTION, UNSPECIFIED TYPE: ICD-10-CM

## 2024-01-29 LAB — HBA1C MFR BLD: 9.2 %

## 2024-01-29 PROCEDURE — 99395 PREV VISIT EST AGE 18-39: CPT | Performed by: NURSE PRACTITIONER

## 2024-01-29 PROCEDURE — G8484 FLU IMMUNIZE NO ADMIN: HCPCS | Performed by: NURSE PRACTITIONER

## 2024-01-29 PROCEDURE — 83036 HEMOGLOBIN GLYCOSYLATED A1C: CPT | Performed by: NURSE PRACTITIONER

## 2024-01-29 RX ORDER — DOXYCYCLINE HYCLATE 100 MG/1
100 CAPSULE ORAL 2 TIMES DAILY
Qty: 20 CAPSULE | Refills: 0 | Status: SHIPPED | OUTPATIENT
Start: 2024-01-29 | End: 2024-02-08

## 2024-01-29 RX ORDER — TIRZEPATIDE 2.5 MG/.5ML
2.5 INJECTION, SOLUTION SUBCUTANEOUS WEEKLY
Qty: 2 ML | Refills: 1 | Status: SHIPPED | OUTPATIENT
Start: 2024-01-29

## 2024-01-29 SDOH — ECONOMIC STABILITY: INCOME INSECURITY: HOW HARD IS IT FOR YOU TO PAY FOR THE VERY BASICS LIKE FOOD, HOUSING, MEDICAL CARE, AND HEATING?: NOT HARD AT ALL

## 2024-01-29 SDOH — ECONOMIC STABILITY: FOOD INSECURITY: WITHIN THE PAST 12 MONTHS, THE FOOD YOU BOUGHT JUST DIDN'T LAST AND YOU DIDN'T HAVE MONEY TO GET MORE.: NEVER TRUE

## 2024-01-29 SDOH — ECONOMIC STABILITY: FOOD INSECURITY: WITHIN THE PAST 12 MONTHS, YOU WORRIED THAT YOUR FOOD WOULD RUN OUT BEFORE YOU GOT MONEY TO BUY MORE.: NEVER TRUE

## 2024-01-29 ASSESSMENT — ENCOUNTER SYMPTOMS
COUGH: 1
SHORTNESS OF BREATH: 0
ABDOMINAL PAIN: 0
SINUS PAIN: 1
BACK PAIN: 0
SINUS PRESSURE: 1

## 2024-01-29 NOTE — PROGRESS NOTES
[facility]  [facility]  [address] [place] [address] DR  [address]  [facility] [address]  Dept: [phone]  Dept Fax: [phone]    Aide Ariza is a 38 y.o. female who presentstoday for her medical conditions/complaints as noted below.  Aide Ariza is c/o of  Chief Complaint   Patient presents with    3 Month Follow-Up    Diabetes    Sinusitis     X 2 days Negative for COVID and Flu     Annual Exam           HPI:     Presents for 3 month recheck on chronic conditions  BP well controlled  Has lost one lb since LOV    Hga1c from 8.2 to 9.2  States she is compliant with diet  Encouraged her to avoid drinking carbs/sugars  Compliant with meds  Requesting to add jardiance  Reviewed it has same mech of action as GLP1  Recommend mounjaro if insurance will cover    C/o URI symptoms x 4 days  Dx with viral illness in UC   Symptoms are not improving    Denies any other problems/concerns      Hemoglobin A1C (%)   Date Value   01/29/2024 9.2   10/27/2023 8.2   08/23/2023 8.7 (H)             ( goal A1C is < 7)   No components found for: \"LABMICR\"  LDL Cholesterol (mg/dL)   Date Value   07/07/2021 49       (goal LDL is <100)   AST (U/L)   Date Value   05/03/2023 11     ALT (U/L)   Date Value   05/03/2023 13     BUN (mg/dL)   Date Value   08/23/2023 12     BP Readings from Last 3 Encounters:   01/29/24 128/84   10/31/23 122/78   10/27/23 124/76          (etin456/80)    Past Medical History:   Diagnosis Date    Anxiety     Arthritis     Bipolar disorder (HCC)     Complication of anesthesia     It has been known for my breathing (oxygen) to be low after surgeries.    Depression     Disc disease, degenerative, lumbar or lumbosacral 2002    Disc disease, degenerative, lumbar or lumbosacral     Drug effect     Alot of medication allergies    Fibromyalgia 2012    GERD (gastroesophageal reflux disease)     IBS (irritable bowel syndrome)     Insulin resistance 07/21/2020    Migraine     Migraines

## 2024-01-31 ASSESSMENT — ANXIETY QUESTIONNAIRES
1. FEELING NERVOUS, ANXIOUS, OR ON EDGE: SEVERAL DAYS
5. BEING SO RESTLESS THAT IT IS HARD TO SIT STILL: NOT AT ALL
4. TROUBLE RELAXING: SEVERAL DAYS
IF YOU CHECKED OFF ANY PROBLEMS ON THIS QUESTIONNAIRE, HOW DIFFICULT HAVE THESE PROBLEMS MADE IT FOR YOU TO DO YOUR WORK, TAKE CARE OF THINGS AT HOME, OR GET ALONG WITH OTHER PEOPLE: SOMEWHAT DIFFICULT
2. NOT BEING ABLE TO STOP OR CONTROL WORRYING: 1
3. WORRYING TOO MUCH ABOUT DIFFERENT THINGS: 0
7. FEELING AFRAID AS IF SOMETHING AWFUL MIGHT HAPPEN: 0
4. TROUBLE RELAXING: 1
3. WORRYING TOO MUCH ABOUT DIFFERENT THINGS: NOT AT ALL
IF YOU CHECKED OFF ANY PROBLEMS ON THIS QUESTIONNAIRE, HOW DIFFICULT HAVE THESE PROBLEMS MADE IT FOR YOU TO DO YOUR WORK, TAKE CARE OF THINGS AT HOME, OR GET ALONG WITH OTHER PEOPLE: SOMEWHAT DIFFICULT
6. BECOMING EASILY ANNOYED OR IRRITABLE: 0
5. BEING SO RESTLESS THAT IT IS HARD TO SIT STILL: 0
GAD7 TOTAL SCORE: 3
1. FEELING NERVOUS, ANXIOUS, OR ON EDGE: 1
6. BECOMING EASILY ANNOYED OR IRRITABLE: NOT AT ALL
2. NOT BEING ABLE TO STOP OR CONTROL WORRYING: SEVERAL DAYS
7. FEELING AFRAID AS IF SOMETHING AWFUL MIGHT HAPPEN: NOT AT ALL

## 2024-01-31 ASSESSMENT — LIFESTYLE VARIABLES
HAVE YOU EVER RECEIVED ALCOHOL OR OTHER DRUG ABUSE TREATMENT: NO
HISTORY_ALCOHOL_USE: NO

## 2024-02-01 ENCOUNTER — OFFICE VISIT (OUTPATIENT)
Dept: BEHAVIORAL/MENTAL HEALTH CLINIC | Age: 39
End: 2024-02-01
Payer: COMMERCIAL

## 2024-02-01 DIAGNOSIS — F33.1 MODERATE EPISODE OF RECURRENT MAJOR DEPRESSIVE DISORDER (HCC): Primary | ICD-10-CM

## 2024-02-01 PROCEDURE — 90791 PSYCH DIAGNOSTIC EVALUATION: CPT | Performed by: PSYCHOLOGIST

## 2024-02-01 PROCEDURE — 1036F TOBACCO NON-USER: CPT | Performed by: PSYCHOLOGIST

## 2024-02-01 NOTE — PROGRESS NOTES
mild anxiety, 10-14 = moderate anxiety, 15+ = severe anxiety. Recommend referral to behavioral health for scores 10 or greater.      DIAGNOSIS  Aide was seen today for psychiatric evaluation.    Diagnoses and all orders for this visit:    Moderate episode of recurrent major depressive disorder (HCC)          INTERVENTION  Discussed prevalence of  depression, anxiety, stress, and agitation for general population, Provided handout on  depression, anxiety, stress, and agitation, Provided education on the use of medication to treat  depression, anxiety, stress, and agitation, Developed Crisis Response Plan, Trained in strategies for increasing balanced thinking, Trained in relaxation strategies, Discussed benefits of referral for specialty care, Established rapport, Conducted functional assessment, and Provided Psychoeducation re: the relationship between post-traumatic stress symptoms and physical symptoms.       PLAN  1) Aide will contact at least three people on the referral list given to her and will meet with Sharon again in two weeks follow-up (2/15).  2) Aide will take her medication as prescribed and will report any negative side effects.      INTERACTIVE COMPLEXITY  Is interactive complexity present?  No  Reason:   N/A  Additional Supporting Information:  N/A       Electronically signed by Sharon Heath on 2/6/2024 at 12:48 PM

## 2024-02-10 DIAGNOSIS — E11.9 TYPE 2 DIABETES MELLITUS WITHOUT COMPLICATION, WITHOUT LONG-TERM CURRENT USE OF INSULIN (HCC): ICD-10-CM

## 2024-02-12 RX ORDER — ACYCLOVIR 400 MG/1
TABLET ORAL
Qty: 3 EACH | Refills: 0 | Status: SHIPPED | OUTPATIENT
Start: 2024-02-12

## 2024-02-15 ENCOUNTER — OFFICE VISIT (OUTPATIENT)
Dept: BEHAVIORAL/MENTAL HEALTH CLINIC | Age: 39
End: 2024-02-15
Payer: COMMERCIAL

## 2024-02-15 DIAGNOSIS — F43.23 ADJUSTMENT DISORDER WITH MIXED ANXIETY AND DEPRESSED MOOD: Primary | ICD-10-CM

## 2024-02-15 PROCEDURE — 90834 PSYTX W PT 45 MINUTES: CPT | Performed by: PSYCHOLOGIST

## 2024-02-15 PROCEDURE — 1036F TOBACCO NON-USER: CPT | Performed by: PSYCHOLOGIST

## 2024-02-15 NOTE — PROGRESS NOTES
ADULT BEHAVIORAL HEALTH FOLLOW UP  Sharon Heath M.A.  [facility]  Natasha Ling, Ph.D. ([place] [id number], [place] [id number])  Pat Daugherty, Ph.D. ([id number])      Visit Date: 2/15/2024   Time of appointment:  3:00PM   Time spent with Patient: 3:00PM-3:40 (40 minutes).   This is patient's second appointment.    Reason for Consult:  Depression and Anxiety     Referring Provider/PCP:    No ref. provider found  Emely Saenz, CLARY - CNP      Pt provided informed consent for the behavioral health program. Discussed with patient model of service to include the limits of confidentiality (i.e. abuse reporting, suicide intervention, etc.) and short-term intervention focused approach.  Pt indicated understanding.    MARKO Noble is a 38 y.o. female who presents for follow up of anxiety, depression, and trauma. Sharon and Aide discussed that the severity of mental health presentation requires longer-term care, coupled with additional needs to which the provider does not offer at this time. Jory called several outside therapy services. Aide scheduled an appointment with a clinician with [facility] to establish long-term care with a trauma-informed provider. Aide and Sharon discussed future goals for long-term therapy and Sharon provided contact information if Aide needed to be seen again at [facility] [place].    Previous Recommendations:   1) Aide will contact at least three people on the referral list given to her and will meet with Sharon again in two weeks follow-up (2/15).  2) Aide will take her medication as prescribed and will report any negative side effects.      MENTAL STATUS EXAM  Mood was within normal limits with calm affect.   Suicidal ideation was denied.   Homicidal ideation was denied.   Hygiene was good .  Dress was neat.   Behavior was Within Normal Limits with No self-report of difficulties ambulating.   Attitude was Cooperative.  Eye-contact was

## 2024-03-03 DIAGNOSIS — E11.9 TYPE 2 DIABETES MELLITUS WITHOUT COMPLICATION, WITHOUT LONG-TERM CURRENT USE OF INSULIN (HCC): ICD-10-CM

## 2024-03-04 RX ORDER — ACYCLOVIR 400 MG/1
TABLET ORAL
Qty: 3 EACH | Refills: 5 | Status: SHIPPED | OUTPATIENT
Start: 2024-03-04

## 2024-03-11 ENCOUNTER — OFFICE VISIT (OUTPATIENT)
Dept: OBGYN CLINIC | Age: 39
End: 2024-03-11
Payer: COMMERCIAL

## 2024-03-11 ENCOUNTER — TELEPHONE (OUTPATIENT)
Dept: SURGERY | Age: 39
End: 2024-03-11

## 2024-03-11 ENCOUNTER — OFFICE VISIT (OUTPATIENT)
Dept: SURGERY | Age: 39
End: 2024-03-11
Payer: COMMERCIAL

## 2024-03-11 ENCOUNTER — HOSPITAL ENCOUNTER (OUTPATIENT)
Age: 39
Setting detail: SPECIMEN
Discharge: HOME | End: 2024-03-11

## 2024-03-11 VITALS
OXYGEN SATURATION: 100 % | SYSTOLIC BLOOD PRESSURE: 138 MMHG | HEART RATE: 84 BPM | BODY MASS INDEX: 45.99 KG/M2 | DIASTOLIC BLOOD PRESSURE: 94 MMHG | WEIGHT: 293 LBS | HEIGHT: 67 IN | RESPIRATION RATE: 16 BRPM

## 2024-03-11 VITALS
WEIGHT: 293 LBS | SYSTOLIC BLOOD PRESSURE: 122 MMHG | HEIGHT: 67 IN | BODY MASS INDEX: 45.99 KG/M2 | DIASTOLIC BLOOD PRESSURE: 88 MMHG

## 2024-03-11 DIAGNOSIS — L98.9 BENIGN SKIN LESION OF THIGH: Primary | ICD-10-CM

## 2024-03-11 DIAGNOSIS — Z80.3 FAMILY HISTORY OF BREAST CANCER IN MOTHER: ICD-10-CM

## 2024-03-11 DIAGNOSIS — Z01.419 ENCOUNTER FOR ANNUAL ROUTINE GYNECOLOGICAL EXAMINATION: Primary | ICD-10-CM

## 2024-03-11 DIAGNOSIS — Z12.31 ENCOUNTER FOR SCREENING MAMMOGRAM FOR MALIGNANT NEOPLASM OF BREAST: ICD-10-CM

## 2024-03-11 PROCEDURE — G8417 CALC BMI ABV UP PARAM F/U: HCPCS | Performed by: SURGERY

## 2024-03-11 PROCEDURE — G8427 DOCREV CUR MEDS BY ELIG CLIN: HCPCS | Performed by: SURGERY

## 2024-03-11 PROCEDURE — 99203 OFFICE O/P NEW LOW 30 MIN: CPT | Performed by: SURGERY

## 2024-03-11 PROCEDURE — 99395 PREV VISIT EST AGE 18-39: CPT | Performed by: NURSE PRACTITIONER

## 2024-03-11 PROCEDURE — 1036F TOBACCO NON-USER: CPT | Performed by: SURGERY

## 2024-03-11 PROCEDURE — G8484 FLU IMMUNIZE NO ADMIN: HCPCS | Performed by: SURGERY

## 2024-03-11 PROCEDURE — G8484 FLU IMMUNIZE NO ADMIN: HCPCS | Performed by: NURSE PRACTITIONER

## 2024-03-11 ASSESSMENT — ENCOUNTER SYMPTOMS
COLOR CHANGE: 0
VOMITING: 0
NAUSEA: 0
SHORTNESS OF BREATH: 0
COUGH: 0

## 2024-03-11 NOTE — PROGRESS NOTES
Chaperone for Intimate Exam  Chaperone was offered as part of the rooming process. Patient declined and agrees to continue with exam without a chaperone.  Chaperone: N/A     
Sister         My daughter    Uterine Cancer Neg Hx     Stroke Neg Hx     Heart Attack Neg Hx      Social History     Tobacco Use   Smoking Status Never   Smokeless Tobacco Never     Social History     Substance and Sexual Activity   Alcohol Use Not Currently    Comment: Its been over three years since my last drink        Social History       Tobacco History       Smoking Status  Never      Smokeless Tobacco Use  Never              Alcohol History       Alcohol Use Status  Not Currently Drinks/Week  0 Glasses of wine, 0 Cans of beer, 0 Shots of liquor, 0 Drinks containing 0.5 oz of alcohol per week Comment  Its been over three years since my last drink              Drug Use       Drug Use Status  Never              Sexual Activity       Sexually Active  Yes Partners  Male Comment  My                   Allergies   Allergen Reactions    Azithromycin Anaphylaxis    Bee Venom Anaphylaxis    Penicillins Anaphylaxis and Hives    Propoxyphene Anaphylaxis    Bupropion Hives    Gabapentin      King helicopters     Levofloxacin Hives    Motrin [Ibuprofen] Nausea Only    Paroxetine Hcl Hives    Rofecoxib Nausea Only     Reaction to VIoxx    Zoloft [Sertraline Hcl] Itching     With rash    Cephalexin Rash     And cough     Current Outpatient Medications   Medication Sig Dispense Refill    Continuous Blood Gluc Sensor (DEXCOM G7 SENSOR) MISC USE AS DIRECTED CHANGING EVERY 10 DAYS 3 each 5    Tirzepatide (MOUNJARO) 2.5 MG/0.5ML SOPN SC injection Inject 0.5 mLs into the skin once a week 2 mL 1    SUMAtriptan (IMITREX) 100 MG tablet TAKE 1 TAB BY MOUTH ONCE AT ONSET, THEN REPEAT IN 1 HR FOR 1 DOSE IF STILL ONGOING 27 tablet 0    Insulin Pen Needle 32G X 4 MM MISC 1 each by Does not apply route daily 100 each 3    Liraglutide (VICTOZA) 18 MG/3ML SOPN SC injection Inject 1.8 mg into the skin daily 6 Adjustable Dose Pre-filled Pen Syringe 3    tiZANidine (ZANAFLEX) 4 MG tablet TAKE 1 TABLET BY MOUTH THREE TIMES DAILY 90

## 2024-03-11 NOTE — TELEPHONE ENCOUNTER
Spoke to patient, surgery scheduled at [place]. Patient confirmed and information given to patient(s) at clinic visit.    Surgery date/time: 4/12/24 at 11:30am  Arrival time: 9:30am  PAT: phone call  Post op: 4/29/24 at 9:20am

## 2024-03-13 LAB
HPV I/H RISK 4 DNA CVX QL NAA+PROBE: NOT DETECTED
HPV SAMPLE: NORMAL
HPV, INTERPRETATION: NORMAL
HPV16 DNA CVX QL NAA+PROBE: NOT DETECTED
HPV18 DNA CVX QL NAA+PROBE: NOT DETECTED
SPECIMEN DESCRIPTION: NORMAL

## 2024-03-14 NOTE — PROGRESS NOTES
[facility] General Surgery  Clinic Evaluation  Soren Mo DO    Pt Name: Aide Ariza  MRN: [id number]  YOB: 1985  Date of evaluation: 3/11/2024  Primary Care Physician: Emely Saenz APRN - CNP    Chief Complaint: skin lesion right thigh      SUBJECTIVE:    History of Present Illness:     This is a 38 y.o.  female who presents for evaluation for the above, lesion present for many years but has slowly grown since onset. Irritating when walking, occasional discomfort when bumped.    Chart review performed to add information to the HPI: Yes    Past Medical History   has a past medical history of Anxiety, Arthritis, Bipolar disorder (HCC), Complication of anesthesia, Depression, Disc disease, degenerative, lumbar or lumbosacral, Disc disease, degenerative, lumbar or lumbosacral, Drug effect, Fibromyalgia, GERD (gastroesophageal reflux disease), IBS (irritable bowel syndrome), Insulin resistance, Migraine, Migraines, Obesity, Overactive bladder, PONV (postoperative nausea and vomiting), Prolonged emergence from general anesthesia, Type 2 diabetes mellitus without complication (HCC), and Uterine disorder.    Past Surgical History   has a past surgical history that includes  section (2005);  section (2008); Cholecystectomy (); [place] tooth extraction; shoulder surgery (Right, ); knee surgery (Left, 2012); Tooth Extraction; Dilation and curettage of uterus (2023); Dilation and curettage of uterus (N/A, 2023); Robotic assisted hysterectomy (2023); and Hysterectomy, vaginal (N/A, 2023).    Family History  family history includes Asthma in her sister; Breast Cancer in her mother; Cancer in her maternal grandfather and maternal grandmother; Cervical Cancer in her maternal grandmother; Colon Cancer in her father; Depression in her mother; Diabetes in her brother, maternal grandmother, and mother; No Known Problems in her paternal

## 2024-03-22 LAB — CYTOLOGY REPORT: NORMAL

## 2024-03-27 RX ORDER — TIRZEPATIDE 2.5 MG/.5ML
INJECTION, SOLUTION SUBCUTANEOUS
Qty: 2 ML | Refills: 1 | Status: SHIPPED | OUTPATIENT
Start: 2024-03-27

## 2024-04-04 NOTE — PROGRESS NOTES
Preoperative Instructions:    Stop eating solid foods  the morning   the day prior to your surgery.  Chose clear liquids the 24 hours prior to surgery.     Stop drinking clear liquids at midnight the night prior to your surgery.    Arrive at the surgery center (3rd entrance) on _3-19-04______________ by ____0900-0930am ___________.     Please stop any blood thinning medications as directed by your surgeon or prescribing physician. Failure to stop certain medications may interfere with your scheduled surgery. These may include: Aspirin, Coumadin, Plavix, NSAIDS (Motrin, Aleve, Advil, Mobic, Celebrex), Eliquis, Pradaxa, Xarelto, Fish oil, and herbal supplements.     You may continue the rest of your medications through the night before surgery unless instructed otherwise.     Day of surgery please take only the following medication(s) with a small sip of water: None      Please  shower with antibacterial soap and water the  day of surgery.      Reminders:  -If you are going home the day of your procedure, you will need a family member or friend to stay during the procedure and drive you home after your procedure. Your  must be 18 years of age or older and able to sign off on your discharge instructions.    -If you are going home the same day of your surgery, someone must remain with you for the first 24 hours after your surgery if you receive sedation or anesthesia.     -Please do not wear any jewelry, lotions, contacts  or body piercing the day of surgery

## 2024-04-05 ENCOUNTER — ANESTHESIA EVENT (OUTPATIENT)
Dept: OPERATING ROOM | Age: 39
End: 2024-04-05
Payer: COMMERCIAL

## 2024-04-12 ENCOUNTER — HOSPITAL ENCOUNTER (OUTPATIENT)
Age: 39
Setting detail: OUTPATIENT SURGERY
Discharge: HOME | End: 2024-04-12
Attending: SURGERY | Admitting: SURGERY
Payer: COMMERCIAL

## 2024-04-12 ENCOUNTER — ANESTHESIA (OUTPATIENT)
Dept: OPERATING ROOM | Age: 39
End: 2024-04-12
Payer: COMMERCIAL

## 2024-04-12 VITALS
TEMPERATURE: 97 F | OXYGEN SATURATION: 93 % | WEIGHT: 293 LBS | SYSTOLIC BLOOD PRESSURE: 116 MMHG | DIASTOLIC BLOOD PRESSURE: 74 MMHG | RESPIRATION RATE: 15 BRPM | HEIGHT: 67 IN | HEART RATE: 80 BPM | BODY MASS INDEX: 45.99 KG/M2

## 2024-04-12 DIAGNOSIS — L98.9 BENIGN SKIN LESION OF THIGH: Primary | ICD-10-CM

## 2024-04-12 DIAGNOSIS — R22.41 MASS OF THIGH, RIGHT: ICD-10-CM

## 2024-04-12 PROCEDURE — 3700000000 HC ANESTHESIA ATTENDED CARE: Performed by: SURGERY

## 2024-04-12 PROCEDURE — 3600000012 HC SURGERY LEVEL 2 ADDTL 15MIN: Performed by: SURGERY

## 2024-04-12 PROCEDURE — 6360000002 HC RX W HCPCS: Performed by: SURGERY

## 2024-04-12 PROCEDURE — 2500000003 HC RX 250 WO HCPCS: Performed by: REGISTERED NURSE

## 2024-04-12 PROCEDURE — 7100000001 HC PACU RECOVERY - ADDTL 15 MIN: Performed by: SURGERY

## 2024-04-12 PROCEDURE — 88305 TISSUE EXAM BY PATHOLOGIST: CPT

## 2024-04-12 PROCEDURE — 2709999900 HC NON-CHARGEABLE SUPPLY: Performed by: SURGERY

## 2024-04-12 PROCEDURE — 7100000010 HC PHASE II RECOVERY - FIRST 15 MIN: Performed by: SURGERY

## 2024-04-12 PROCEDURE — 3600000002 HC SURGERY LEVEL 2 BASE: Performed by: SURGERY

## 2024-04-12 PROCEDURE — 2500000003 HC RX 250 WO HCPCS: Performed by: SURGERY

## 2024-04-12 PROCEDURE — 11406 EXC TR-EXT B9+MARG >4.0 CM: CPT | Performed by: SURGERY

## 2024-04-12 PROCEDURE — 6360000002 HC RX W HCPCS: Performed by: REGISTERED NURSE

## 2024-04-12 PROCEDURE — 7100000011 HC PHASE II RECOVERY - ADDTL 15 MIN: Performed by: SURGERY

## 2024-04-12 PROCEDURE — 6370000000 HC RX 637 (ALT 250 FOR IP)

## 2024-04-12 PROCEDURE — 3700000001 HC ADD 15 MINUTES (ANESTHESIA): Performed by: SURGERY

## 2024-04-12 PROCEDURE — 7100000000 HC PACU RECOVERY - FIRST 15 MIN: Performed by: SURGERY

## 2024-04-12 PROCEDURE — 2580000003 HC RX 258: Performed by: ANESTHESIOLOGY

## 2024-04-12 RX ORDER — SODIUM CHLORIDE 0.9 % (FLUSH) 0.9 %
5-40 SYRINGE (ML) INJECTION PRN
Status: DISCONTINUED | OUTPATIENT
Start: 2024-04-12 | End: 2024-04-12 | Stop reason: HOSPADM

## 2024-04-12 RX ORDER — GLYCOPYRROLATE 0.2 MG/ML
INJECTION INTRAMUSCULAR; INTRAVENOUS PRN
Status: DISCONTINUED | OUTPATIENT
Start: 2024-04-12 | End: 2024-04-12 | Stop reason: SDUPTHER

## 2024-04-12 RX ORDER — SODIUM CHLORIDE 9 MG/ML
INJECTION, SOLUTION INTRAVENOUS PRN
Status: DISCONTINUED | OUTPATIENT
Start: 2024-04-12 | End: 2024-04-12 | Stop reason: HOSPADM

## 2024-04-12 RX ORDER — DEXMEDETOMIDINE HYDROCHLORIDE 100 UG/ML
INJECTION, SOLUTION INTRAVENOUS PRN
Status: DISCONTINUED | OUTPATIENT
Start: 2024-04-12 | End: 2024-04-12 | Stop reason: SDUPTHER

## 2024-04-12 RX ORDER — NALOXONE HYDROCHLORIDE 0.4 MG/ML
INJECTION, SOLUTION INTRAMUSCULAR; INTRAVENOUS; SUBCUTANEOUS PRN
Status: DISCONTINUED | OUTPATIENT
Start: 2024-04-12 | End: 2024-04-12 | Stop reason: HOSPADM

## 2024-04-12 RX ORDER — KETAMINE HYDROCHLORIDE 100 MG/ML
INJECTION, SOLUTION INTRAMUSCULAR; INTRAVENOUS
Status: COMPLETED
Start: 2024-04-12 | End: 2024-04-12

## 2024-04-12 RX ORDER — HYDRALAZINE HYDROCHLORIDE 20 MG/ML
10 INJECTION INTRAMUSCULAR; INTRAVENOUS
Status: DISCONTINUED | OUTPATIENT
Start: 2024-04-12 | End: 2024-04-12 | Stop reason: HOSPADM

## 2024-04-12 RX ORDER — KETAMINE HYDROCHLORIDE 100 MG/ML
INJECTION, SOLUTION INTRAMUSCULAR; INTRAVENOUS PRN
Status: DISCONTINUED | OUTPATIENT
Start: 2024-04-12 | End: 2024-04-12 | Stop reason: SDUPTHER

## 2024-04-12 RX ORDER — HYDROCODONE BITARTRATE AND ACETAMINOPHEN 5; 325 MG/1; MG/1
1 TABLET ORAL EVERY 6 HOURS PRN
Qty: 10 TABLET | Refills: 0 | Status: SHIPPED | OUTPATIENT
Start: 2024-04-12 | End: 2024-04-19

## 2024-04-12 RX ORDER — HYDROCODONE BITARTRATE AND ACETAMINOPHEN 5; 325 MG/1; MG/1
1 TABLET ORAL ONCE
Status: COMPLETED | OUTPATIENT
Start: 2024-04-12 | End: 2024-04-12

## 2024-04-12 RX ORDER — DOXYCYCLINE HYCLATE 100 MG
100 TABLET ORAL 2 TIMES DAILY
Qty: 10 TABLET | Refills: 0 | Status: SHIPPED | OUTPATIENT
Start: 2024-04-12 | End: 2024-04-17

## 2024-04-12 RX ORDER — PROCHLORPERAZINE EDISYLATE 5 MG/ML
5 INJECTION INTRAMUSCULAR; INTRAVENOUS
Status: DISCONTINUED | OUTPATIENT
Start: 2024-04-12 | End: 2024-04-12 | Stop reason: HOSPADM

## 2024-04-12 RX ORDER — SODIUM CHLORIDE 0.9 % (FLUSH) 0.9 %
5-40 SYRINGE (ML) INJECTION EVERY 12 HOURS SCHEDULED
Status: DISCONTINUED | OUTPATIENT
Start: 2024-04-12 | End: 2024-04-12 | Stop reason: HOSPADM

## 2024-04-12 RX ORDER — HYDROCODONE BITARTRATE AND ACETAMINOPHEN 5; 325 MG/1; MG/1
TABLET ORAL
Status: COMPLETED
Start: 2024-04-12 | End: 2024-04-12

## 2024-04-12 RX ORDER — LIDOCAINE HYDROCHLORIDE 10 MG/ML
1 INJECTION, SOLUTION EPIDURAL; INFILTRATION; INTRACAUDAL; PERINEURAL
Status: DISCONTINUED | OUTPATIENT
Start: 2024-04-12 | End: 2024-04-12 | Stop reason: HOSPADM

## 2024-04-12 RX ORDER — LABETALOL HYDROCHLORIDE 5 MG/ML
10 INJECTION, SOLUTION INTRAVENOUS
Status: DISCONTINUED | OUTPATIENT
Start: 2024-04-12 | End: 2024-04-12 | Stop reason: HOSPADM

## 2024-04-12 RX ORDER — SODIUM CHLORIDE, SODIUM LACTATE, POTASSIUM CHLORIDE, CALCIUM CHLORIDE 600; 310; 30; 20 MG/100ML; MG/100ML; MG/100ML; MG/100ML
INJECTION, SOLUTION INTRAVENOUS CONTINUOUS
Status: DISCONTINUED | OUTPATIENT
Start: 2024-04-12 | End: 2024-04-12 | Stop reason: HOSPADM

## 2024-04-12 RX ORDER — MIDAZOLAM HYDROCHLORIDE 1 MG/ML
INJECTION INTRAMUSCULAR; INTRAVENOUS PRN
Status: DISCONTINUED | OUTPATIENT
Start: 2024-04-12 | End: 2024-04-12 | Stop reason: SDUPTHER

## 2024-04-12 RX ADMIN — DEXMEDETOMIDINE 8 MCG: 100 INJECTION, SOLUTION INTRAVENOUS at 11:16

## 2024-04-12 RX ADMIN — KETAMINE HYDROCHLORIDE 30 MG: 100 INJECTION, SOLUTION, CONCENTRATE INTRAMUSCULAR; INTRAVENOUS at 10:59

## 2024-04-12 RX ADMIN — SODIUM CHLORIDE: 9 INJECTION, SOLUTION INTRAVENOUS at 09:43

## 2024-04-12 RX ADMIN — KETAMINE HYDROCHLORIDE 20 MG: 100 INJECTION, SOLUTION, CONCENTRATE INTRAMUSCULAR; INTRAVENOUS at 11:05

## 2024-04-12 RX ADMIN — KETAMINE HYDROCHLORIDE 50 MG: 100 INJECTION, SOLUTION, CONCENTRATE INTRAMUSCULAR; INTRAVENOUS at 10:55

## 2024-04-12 RX ADMIN — HYDROCODONE BITARTRATE AND ACETAMINOPHEN 1 TABLET: 5; 325 TABLET ORAL at 12:06

## 2024-04-12 RX ADMIN — MIDAZOLAM 2 MG: 1 INJECTION INTRAMUSCULAR; INTRAVENOUS at 10:51

## 2024-04-12 RX ADMIN — DEXMEDETOMIDINE 20 MCG: 100 INJECTION, SOLUTION INTRAVENOUS at 11:05

## 2024-04-12 RX ADMIN — DEXMEDETOMIDINE 20 MCG: 100 INJECTION, SOLUTION INTRAVENOUS at 10:55

## 2024-04-12 RX ADMIN — GLYCOPYRROLATE 0.3 MG: 0.2 INJECTION INTRAMUSCULAR; INTRAVENOUS at 11:02

## 2024-04-12 ASSESSMENT — PAIN SCALES - GENERAL
PAINLEVEL_OUTOF10: 7
PAINLEVEL_OUTOF10: 5

## 2024-04-12 ASSESSMENT — PAIN DESCRIPTION - LOCATION
LOCATION: INCISION
LOCATION: INCISION

## 2024-04-12 ASSESSMENT — PAIN DESCRIPTION - DESCRIPTORS
DESCRIPTORS: ACHING;BURNING
DESCRIPTORS: ACHING;BURNING

## 2024-04-12 ASSESSMENT — PAIN DESCRIPTION - ORIENTATION
ORIENTATION: RIGHT
ORIENTATION: RIGHT

## 2024-04-12 NOTE — DISCHARGE INSTRUCTIONS
Patient Discharge Instructions  Discharge Date:  4/12/2024    HYGEINE: Ok to shower 04/13/24, no soaking in a tub/pool until seen at your follow up appointment. Clean incision daily with gentle soap and water, do not use alcohol/peroxide or any harsh cleansers.    DRIVING: No driving while taking narcotic medications or while in pain    ACTIVITY: No strain to incision.      DIET: Resume your normal diet as advised by your PCP.    MEDICATIONS: Take all medications as prescribed. Take Colace until you have your first bowel movement, continue to take if you are using narcotics for pain control    SPECIAL INSTRUCTIONS:     Follow up with Dr. Mo in 10-14 days, call the clinic for appointment. Call sooner if fever above 100.4 degrees Farenheit, increase in swelling or redness, thick purulent discharge or pain not controlled with medications.

## 2024-04-12 NOTE — H&P
[facility] General Surgery  [facility]      Patient's Name/ Date of Birth/ Gender: Aide Ariza / 1985 (39 y.o.) / female     Attending physician: Soren Mo DO    CC: R thigh skin lesion    History of present Illness: Aide Ariza is a 39 y.o. female, presents for skin lesion excision.     Past Medical History:  has a past medical history of Anxiety, Arthritis, Bipolar disorder ([facility]), Complication of anesthesia, Depression, Disc disease, degenerative, lumbar or lumbosacral, Disc disease, degenerative, lumbar or lumbosacral, Drug effect, Fibromyalgia, GERD (gastroesophageal reflux disease), IBS (irritable bowel syndrome), Insulin resistance, Migraine, Migraines, Obesity, Overactive bladder, PONV (postoperative nausea and vomiting), Prolonged emergence from general anesthesia, Type 2 diabetes mellitus without complication ([facility]), and Uterine disorder.    Past Surgical History:   Past Surgical History:   Procedure Laterality Date     SECTION  2005     SECTION  2008    CHOLECYSTECTOMY  2000    DILATION AND CURETTAGE OF UTERUS  2023    DILATATION AND CURETTAGE HYSTEROSCOPY MYOSURE XL    DILATION AND CURETTAGE OF UTERUS N/A 2023    DILATATION AND CURETTAGE HYSTEROSCOPY MYOSURE XL performed by Homer Quispe DO at [facility] OR    HYSTERECTOMY, VAGINAL N/A 2023    LAPAROSCOPIC ROBOTIC ASSISTED HYSTERECTOMY VAGINAL WITH BILATERAL SALPINGECTOMY WITH AMNIOBAND AND  CYSTOSCOPY performed by Homer Quispe DO at [facility] OR    KNEE SURGERY Left 2012    ROBOTIC ASSISTED HYSTERECTOMY  2023    LAPAROSCOPIC ROBOTIC ASSISTED HYSTERECTOMY VAGINAL WITH BILATERAL SALPINGECTOMY WITH AMNIOBAND AND  CYSTOSCOPY    SHOULDER SURGERY Right 2017    tendon tear    TOOTH EXTRACTION      WISDOM TOOTH EXTRACTION         Social History:  reports that she has never smoked. She has never used smokeless tobacco. She reports that she does not

## 2024-04-12 NOTE — OP NOTE
Operative Note      Patient: Aide Ariza  YOB: 1985  MRN: [id number]    Date of Procedure: 4/12/2024    Pre-Op Diagnosis Codes:     * Mass of thigh, right [R22.41]    Post-Op Diagnosis: Same, total dimensions 6x3cm       Procedure(s):  RIGHT THIGH SKIN MASS  EXCISION    Surgeon(s):  Soren Mo DO    Assistant:   * No surgical staff found *    Anesthesia: Monitor Anesthesia Care    Estimated Blood Loss (mL): Minimal    Complications: None    Specimens:   ID Type Source Tests Collected by Time Destination   A : right buttocks skin mass Tissue Tissue SURGICAL PATHOLOGY Soren Mo DO 4/12/2024 1109        Implants:  * No implants in log *      Drains: * No LDAs found *    Findings:  Infection Present At Time Of Surgery (PATOS) (choose all levels that have infection present):  No infection present  Other Findings: as above wound class 1    Detailed Description of Procedure:       HISTORY: The patient is a 39 y.o. year old female with history of above preop diagnosis.  The risk, benefits, expected outcome, and alternatives to the procedure were explained to the patient's understanding and written informed consent was obtained.     OPERATIVE DETAIL:  The patient was brought to the operating suite, was transferred to the operating table in supine position. Timeout was performed verifying correct patient, position, equipment and procedure to be performed.  EPC cuffs were applied.     Anesthesia was administered using MAC guidelines.      The patient was placed in lithotomy position with all appropriate padding.     The right upper/posterior thigh was prepped/draped in the usual sterile fashion. Local analgesia with 1% lidocaine and 0.25% marcaine without epinephrine.    The mass was transected at the skin level with scalpel and cautery. Hemostasis achieved with cautery. The defect was closed with interrupted sutures of 2-0 nylon. This was covered with sterile dressings.        All sponge needle and

## 2024-04-12 NOTE — ANESTHESIA POSTPROCEDURE EVALUATION
Department of Anesthesiology  Postprocedure Note    Patient: Aide Ariza  MRN: [id number]  YOB: 1985  Date of evaluation: 4/12/2024    Procedure Summary       Date: 04/12/24 Room / Location: [address]    Anesthesia Start: 1051 Anesthesia Stop: 1130    Procedure: RIGHT THIGH SKIN MASS  EXCISION (Right: Thigh) Diagnosis:       Mass of thigh, right      (Mass of thigh, right [R22.41])    Surgeons: Soren Mo DO Responsible Provider: Norma Garza MD    Anesthesia Type: TIVA ASA Status: 3            Anesthesia Type: No value filed.    Anette Phase I: Anette Score: 10    Anette Phase II: Anette Score: 10    Anesthesia Post Evaluation    Patient location during evaluation: PACU  Patient participation: complete - patient participated  Level of consciousness: awake and awake and alert  Pain score: 2  Nausea & Vomiting: no nausea and no vomiting  Cardiovascular status: hemodynamically stable and blood pressure returned to baseline  Respiratory status: acceptable  Hydration status: euvolemic  Multimodal analgesia pain management approach  Pain management: adequate    No notable events documented.

## 2024-04-12 NOTE — ANESTHESIA PRE PROCEDURE
Department of Anesthesiology  Preprocedure Note       Name:  Aide Ariza   Age:  39 y.o.  :  1985                                          MRN:  [id number]         Date:  2024      Surgeon: Surgeon(s):  Soren Mo DO    Procedure: Procedure(s):  RIGHT THIGH SKIN MASS  EXCISION    Medications prior to admission:   Prior to Admission medications    Medication Sig Start Date End Date Taking? Authorizing Provider   HYDROcodone-acetaminophen (NORCO) 5-325 MG per tablet Take 1 tablet by mouth every 6 hours as needed for Pain for up to 7 days. Intended supply: 7 days. Take lowest dose possible to manage pain Max Daily Amount: 4 tablets 24 Yes Soren Mo DO   doxycycline hyclate (VIBRA-TABS) 100 MG tablet Take 1 tablet by mouth 2 times daily for 5 days 24 Yes Soren Mo DO   Tirzepatide (MOUNJARO) 2.5 MG/0.5ML SOPN SC injection INJECT 0.5 ML SUBCUTANEOUSLY ONE TIME PER WEEK 3/27/24   Scottie Joy PA-C   Continuous Blood Gluc Sensor (DEXCOM G7 SENSOR) MISC USE AS DIRECTED CHANGING EVERY 10 DAYS 3/4/24   Emely Saenz APRN - CNP   SUMAtriptan (IMITREX) 100 MG tablet TAKE 1 TAB BY MOUTH ONCE AT ONSET, THEN REPEAT IN 1 HR FOR 1 DOSE IF STILL ONGOING 24   Emely Saenz APRN - CNP   Insulin Pen Needle 32G X 4 MM MISC 1 each by Does not apply route daily 23   Emely Saenz APRN - CNP   Liraglutide (VICTOZA) 18 MG/3ML SOPN SC injection Inject 1.8 mg into the skin daily 23   Emely Saenz APRN - CNP   tiZANidine (ZANAFLEX) 4 MG tablet TAKE 1 TABLET BY MOUTH THREE TIMES DAILY 10/25/23   Emely Saenz APRN - CNP   lidocaine (LIDODERM) 5 % USE 1 PATCH EXTERNALLY ONCE DAILY FOR  12  HOURS  ON,  12  HOURS  OFF  Patient not taking: Reported on 2024 10/25/23   Emely Saenz APRN - CNP   Insulin Glargine-yfgn 100 UNIT/ML SOPN INJECT 20 UNITS SUBCUTANEOUSLY NIGHTLY 10/13/23   Emely Saenz APRN - CNP   acetaminophen

## 2024-04-16 LAB — SURGICAL PATHOLOGY REPORT: NORMAL

## 2024-04-29 ENCOUNTER — OFFICE VISIT (OUTPATIENT)
Dept: PRIMARY CARE CLINIC | Age: 39
End: 2024-04-29
Payer: COMMERCIAL

## 2024-04-29 VITALS
RESPIRATION RATE: 16 BRPM | DIASTOLIC BLOOD PRESSURE: 84 MMHG | HEART RATE: 76 BPM | WEIGHT: 293 LBS | HEIGHT: 67 IN | SYSTOLIC BLOOD PRESSURE: 116 MMHG | BODY MASS INDEX: 45.99 KG/M2 | OXYGEN SATURATION: 96 %

## 2024-04-29 DIAGNOSIS — E11.9 TYPE 2 DIABETES MELLITUS WITHOUT COMPLICATION, WITHOUT LONG-TERM CURRENT USE OF INSULIN (HCC): ICD-10-CM

## 2024-04-29 DIAGNOSIS — Z00.00 ENCOUNTER FOR GENERAL ADULT MEDICAL EXAMINATION W/O ABNORMAL FINDINGS: Primary | ICD-10-CM

## 2024-04-29 LAB — HBA1C MFR BLD: 7.2 %

## 2024-04-29 PROCEDURE — 83036 HEMOGLOBIN GLYCOSYLATED A1C: CPT | Performed by: NURSE PRACTITIONER

## 2024-04-29 PROCEDURE — 99395 PREV VISIT EST AGE 18-39: CPT | Performed by: NURSE PRACTITIONER

## 2024-04-29 SDOH — ECONOMIC STABILITY: FOOD INSECURITY: WITHIN THE PAST 12 MONTHS, YOU WORRIED THAT YOUR FOOD WOULD RUN OUT BEFORE YOU GOT MONEY TO BUY MORE.: NEVER TRUE

## 2024-04-29 SDOH — ECONOMIC STABILITY: FOOD INSECURITY: WITHIN THE PAST 12 MONTHS, THE FOOD YOU BOUGHT JUST DIDN'T LAST AND YOU DIDN'T HAVE MONEY TO GET MORE.: NEVER TRUE

## 2024-04-29 SDOH — ECONOMIC STABILITY: INCOME INSECURITY: HOW HARD IS IT FOR YOU TO PAY FOR THE VERY BASICS LIKE FOOD, HOUSING, MEDICAL CARE, AND HEATING?: NOT HARD AT ALL

## 2024-04-29 ASSESSMENT — PATIENT HEALTH QUESTIONNAIRE - PHQ9
8. MOVING OR SPEAKING SO SLOWLY THAT OTHER PEOPLE COULD HAVE NOTICED. OR THE OPPOSITE, BEING SO FIGETY OR RESTLESS THAT YOU HAVE BEEN MOVING AROUND A LOT MORE THAN USUAL: NOT AT ALL
SUM OF ALL RESPONSES TO PHQ QUESTIONS 1-9: 0
SUM OF ALL RESPONSES TO PHQ QUESTIONS 1-9: 0
9. THOUGHTS THAT YOU WOULD BE BETTER OFF DEAD, OR OF HURTING YOURSELF: NOT AT ALL
SUM OF ALL RESPONSES TO PHQ9 QUESTIONS 1 & 2: 0
SUM OF ALL RESPONSES TO PHQ QUESTIONS 1-9: 0
1. LITTLE INTEREST OR PLEASURE IN DOING THINGS: NOT AT ALL
2. FEELING DOWN, DEPRESSED OR HOPELESS: NOT AT ALL
SUM OF ALL RESPONSES TO PHQ QUESTIONS 1-9: 0
6. FEELING BAD ABOUT YOURSELF - OR THAT YOU ARE A FAILURE OR HAVE LET YOURSELF OR YOUR FAMILY DOWN: NOT AT ALL
4. FEELING TIRED OR HAVING LITTLE ENERGY: NOT AT ALL
7. TROUBLE CONCENTRATING ON THINGS, SUCH AS READING THE NEWSPAPER OR WATCHING TELEVISION: NOT AT ALL
5. POOR APPETITE OR OVEREATING: NOT AT ALL
10. IF YOU CHECKED OFF ANY PROBLEMS, HOW DIFFICULT HAVE THESE PROBLEMS MADE IT FOR YOU TO DO YOUR WORK, TAKE CARE OF THINGS AT HOME, OR GET ALONG WITH OTHER PEOPLE: NOT DIFFICULT AT ALL
3. TROUBLE FALLING OR STAYING ASLEEP: NOT AT ALL

## 2024-04-29 ASSESSMENT — ENCOUNTER SYMPTOMS
COUGH: 0
SHORTNESS OF BREATH: 0
BACK PAIN: 0
ABDOMINAL PAIN: 0

## 2024-04-29 NOTE — PROGRESS NOTES
[facility]  [facility] PRIMARY CARE  [address] [place] [address] [address]  [address]  [facility] [address]  Dept: [phone]  Dept Fax: [phone]    Aide Ariza is a 39 y.o. female who presentstoday for her medical conditions/complaints as noted below.  Aide Ariza is c/o of  Chief Complaint   Patient presents with    Diabetes    3 Month Follow-Up    Annual Exam         HPI:     Presents with daughter for annual exam  BP well controlled  Has gained 2lb since LOV    Hga1c from 9.2 to 7.2  Compliant with current meds  Denies any side effects with use of med    Recent surgery with Dr. Mo  Has recheck on 4/30/24    Anxiety and depression stable  Had one panic attack-unprovoked. Resolved on its own  No longer following with counselor  Declines any need for intervention    Annual labs up to date  Has order for mammogram pending    Denies any other problems/concerns        Hemoglobin A1C (%)   Date Value   04/29/2024 7.2   01/29/2024 9.2   10/27/2023 8.2             ( goal A1C is < 7)   No components found for: \"LABMICR\"  LDL Cholesterol (mg/dL)   Date Value   07/07/2021 49       (goal LDL is <100)   AST (U/L)   Date Value   05/03/2023 11     ALT (U/L)   Date Value   05/03/2023 13     BUN (mg/dL)   Date Value   08/23/2023 12     BP Readings from Last 3 Encounters:   04/29/24 116/84   04/12/24 116/74   03/11/24 122/88          ([id number])    Past Medical History:   Diagnosis Date    Anxiety     Arthritis     Bipolar disorder (HCC)     Chronic back pain 2013    Complication of anesthesia     It has been known for my breathing (oxygen) to be low after surgeries.    Depression     Disc disease, degenerative, lumbar or lumbosacral 2002    Disc disease, degenerative, lumbar or lumbosacral     Drug effect     Alot of medication allergies    Fibromyalgia 2012    GERD (gastroesophageal reflux disease)     IBS (irritable bowel syndrome)     Insulin resistance 07/21/2020    Migraine     Migraines     Obesity

## 2024-04-30 ENCOUNTER — OFFICE VISIT (OUTPATIENT)
Dept: SURGERY | Age: 39
End: 2024-04-30
Payer: COMMERCIAL

## 2024-04-30 VITALS
OXYGEN SATURATION: 99 % | WEIGHT: 293 LBS | SYSTOLIC BLOOD PRESSURE: 120 MMHG | HEART RATE: 77 BPM | BODY MASS INDEX: 45.99 KG/M2 | HEIGHT: 67 IN | DIASTOLIC BLOOD PRESSURE: 81 MMHG | RESPIRATION RATE: 16 BRPM

## 2024-04-30 DIAGNOSIS — L98.9 BENIGN SKIN LESION OF THIGH: Primary | ICD-10-CM

## 2024-04-30 PROCEDURE — G8427 DOCREV CUR MEDS BY ELIG CLIN: HCPCS | Performed by: SURGERY

## 2024-04-30 PROCEDURE — 99213 OFFICE O/P EST LOW 20 MIN: CPT | Performed by: SURGERY

## 2024-04-30 PROCEDURE — 1036F TOBACCO NON-USER: CPT | Performed by: SURGERY

## 2024-04-30 PROCEDURE — G8417 CALC BMI ABV UP PARAM F/U: HCPCS | Performed by: SURGERY

## 2024-04-30 NOTE — PROGRESS NOTES
[facility] General Surgery   Clinic Evaluation  Soren Mo DO    PATIENT NAME: Aide Ariza     CLINIC VISIT DATE: 4/30/2024    SUBJECTIVE:  Aide Ariza is a 39 y.o. female who presents to the clinic today for follow up to excision of large skin mass performed 4/12/2024. No new issues since surgery, pt is tolerating regular diet and having normal BM. 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           OBJECTIVE:    /81 (Site: Right Upper Arm, Position: Sitting, Cuff Size: Large Adult)   Pulse 77   Resp 16   Ht 1.702 m (5' 7\")   Wt (!) 160.6 kg (354 lb)   LMP 07/21/2023 Comment: urine preg negative  SpO2 99%   BMI 55.44 kg/m²     General Appearance:  awake, alert, no acute distress, well developed, well nourished   Skin: incision well healed without bleeding/infection, one suture is missing but others remain intact  Lungs:  Normal chest expansion, unlabored breathing without accessory muscle use.   No audible rales, rhonchi, or wheezing.  Cardiovascular: S1S2. No evidence of JVD. No evidence of pulsatile masses in abdomen  Abdomen:  Soft, non-tender, no organomegaly, no masses. Incisions C/D/I without evidence of bleeding/infection  Musculoskeletal: No evidence of bony/muscular deformities, trauma, atrophy of either left/right upper/lower extremity. No evidence of digital clubbing or cyanosis.      ASSESSMENT:  Nevus lipomatosis superficialis      PLAN:  Local hygiene per routine  We discussed the possibility this may return in the future  F/U prn    Electronically signed by Soren Mo DO on 4/30/2024 at 9:20 AM

## 2024-05-15 RX ORDER — TIRZEPATIDE 2.5 MG/.5ML
INJECTION, SOLUTION SUBCUTANEOUS
Qty: 30 EACH | Refills: 1 | Status: SHIPPED | OUTPATIENT
Start: 2024-05-15 | End: 2025-05-15

## 2024-05-30 DIAGNOSIS — G43.009 MIGRAINE WITHOUT AURA AND WITHOUT STATUS MIGRAINOSUS, NOT INTRACTABLE: ICD-10-CM

## 2024-05-30 RX ORDER — SUMATRIPTAN 100 MG/1
TABLET, FILM COATED ORAL
Qty: 27 TABLET | Refills: 0 | Status: SHIPPED | OUTPATIENT
Start: 2024-05-30

## 2024-07-29 ENCOUNTER — OFFICE VISIT (OUTPATIENT)
Dept: PRIMARY CARE CLINIC | Age: 39
End: 2024-07-29
Payer: COMMERCIAL

## 2024-07-29 VITALS
OXYGEN SATURATION: 97 % | HEART RATE: 76 BPM | DIASTOLIC BLOOD PRESSURE: 82 MMHG | HEIGHT: 67 IN | WEIGHT: 293 LBS | BODY MASS INDEX: 45.99 KG/M2 | RESPIRATION RATE: 16 BRPM | SYSTOLIC BLOOD PRESSURE: 124 MMHG

## 2024-07-29 DIAGNOSIS — E11.9 TYPE 2 DIABETES MELLITUS WITHOUT COMPLICATION, WITHOUT LONG-TERM CURRENT USE OF INSULIN (HCC): Primary | ICD-10-CM

## 2024-07-29 DIAGNOSIS — F31.9 BIPOLAR AFFECTIVE DISORDER, REMISSION STATUS UNSPECIFIED (HCC): ICD-10-CM

## 2024-07-29 DIAGNOSIS — F41.9 ANXIETY: ICD-10-CM

## 2024-07-29 DIAGNOSIS — G43.009 MIGRAINE WITHOUT AURA AND WITHOUT STATUS MIGRAINOSUS, NOT INTRACTABLE: ICD-10-CM

## 2024-07-29 LAB — HBA1C MFR BLD: 8 %

## 2024-07-29 PROCEDURE — 3052F HG A1C>EQUAL 8.0%<EQUAL 9.0%: CPT | Performed by: NURSE PRACTITIONER

## 2024-07-29 PROCEDURE — 99214 OFFICE O/P EST MOD 30 MIN: CPT | Performed by: NURSE PRACTITIONER

## 2024-07-29 PROCEDURE — 83036 HEMOGLOBIN GLYCOSYLATED A1C: CPT | Performed by: NURSE PRACTITIONER

## 2024-07-29 RX ORDER — GLIPIZIDE 10 MG/1
10 TABLET ORAL 2 TIMES DAILY
Qty: 60 TABLET | Refills: 3 | Status: SHIPPED | OUTPATIENT
Start: 2024-07-29

## 2024-07-29 RX ORDER — ONDANSETRON 4 MG/1
4 TABLET, FILM COATED ORAL 3 TIMES DAILY PRN
Qty: 15 TABLET | Refills: 0 | Status: SHIPPED | OUTPATIENT
Start: 2024-07-29

## 2024-07-29 RX ORDER — LIRAGLUTIDE 6 MG/ML
1.8 INJECTION SUBCUTANEOUS DAILY
Qty: 2 ADJUSTABLE DOSE PRE-FILLED PEN SYRINGE | Refills: 3 | Status: SHIPPED | OUTPATIENT
Start: 2024-07-29 | End: 2024-07-29 | Stop reason: SDUPTHER

## 2024-07-29 RX ORDER — GLIPIZIDE 10 MG/1
10 TABLET ORAL 2 TIMES DAILY
Qty: 60 TABLET | Refills: 3 | Status: SHIPPED | OUTPATIENT
Start: 2024-07-29 | End: 2024-07-29 | Stop reason: SDUPTHER

## 2024-07-29 RX ORDER — LIRAGLUTIDE 6 MG/ML
1.8 INJECTION SUBCUTANEOUS DAILY
Qty: 2 ADJUSTABLE DOSE PRE-FILLED PEN SYRINGE | Refills: 3 | Status: SHIPPED | OUTPATIENT
Start: 2024-07-29

## 2024-07-29 RX ORDER — ONDANSETRON 4 MG/1
4 TABLET, FILM COATED ORAL 3 TIMES DAILY PRN
Qty: 15 TABLET | Refills: 0 | Status: SHIPPED | OUTPATIENT
Start: 2024-07-29 | End: 2024-07-29 | Stop reason: SDUPTHER

## 2024-07-29 RX ORDER — INSULIN GLARGINE 100 [IU]/ML
10 INJECTION, SOLUTION SUBCUTANEOUS NIGHTLY
Qty: 5 ADJUSTABLE DOSE PRE-FILLED PEN SYRINGE | Refills: 0 | Status: SHIPPED | OUTPATIENT
Start: 2024-07-29

## 2024-07-29 RX ORDER — INSULIN GLARGINE 100 [IU]/ML
10 INJECTION, SOLUTION SUBCUTANEOUS NIGHTLY
Qty: 5 ADJUSTABLE DOSE PRE-FILLED PEN SYRINGE | Refills: 0 | Status: SHIPPED | OUTPATIENT
Start: 2024-07-29 | End: 2024-07-29 | Stop reason: SDUPTHER

## 2024-07-29 SDOH — ECONOMIC STABILITY: FOOD INSECURITY: WITHIN THE PAST 12 MONTHS, YOU WORRIED THAT YOUR FOOD WOULD RUN OUT BEFORE YOU GOT MONEY TO BUY MORE.: NEVER TRUE

## 2024-07-29 SDOH — ECONOMIC STABILITY: FOOD INSECURITY: WITHIN THE PAST 12 MONTHS, THE FOOD YOU BOUGHT JUST DIDN'T LAST AND YOU DIDN'T HAVE MONEY TO GET MORE.: NEVER TRUE

## 2024-07-29 SDOH — ECONOMIC STABILITY: INCOME INSECURITY: HOW HARD IS IT FOR YOU TO PAY FOR THE VERY BASICS LIKE FOOD, HOUSING, MEDICAL CARE, AND HEATING?: NOT HARD AT ALL

## 2024-07-29 ASSESSMENT — PATIENT HEALTH QUESTIONNAIRE - PHQ9
5. POOR APPETITE OR OVEREATING: NOT AT ALL
SUM OF ALL RESPONSES TO PHQ QUESTIONS 1-9: 1
3. TROUBLE FALLING OR STAYING ASLEEP: NOT AT ALL
2. FEELING DOWN, DEPRESSED OR HOPELESS: SEVERAL DAYS
8. MOVING OR SPEAKING SO SLOWLY THAT OTHER PEOPLE COULD HAVE NOTICED. OR THE OPPOSITE, BEING SO FIGETY OR RESTLESS THAT YOU HAVE BEEN MOVING AROUND A LOT MORE THAN USUAL: NOT AT ALL
4. FEELING TIRED OR HAVING LITTLE ENERGY: NOT AT ALL
9. THOUGHTS THAT YOU WOULD BE BETTER OFF DEAD, OR OF HURTING YOURSELF: NOT AT ALL
7. TROUBLE CONCENTRATING ON THINGS, SUCH AS READING THE NEWSPAPER OR WATCHING TELEVISION: NOT AT ALL
10. IF YOU CHECKED OFF ANY PROBLEMS, HOW DIFFICULT HAVE THESE PROBLEMS MADE IT FOR YOU TO DO YOUR WORK, TAKE CARE OF THINGS AT HOME, OR GET ALONG WITH OTHER PEOPLE: NOT DIFFICULT AT ALL
SUM OF ALL RESPONSES TO PHQ QUESTIONS 1-9: 1
SUM OF ALL RESPONSES TO PHQ9 QUESTIONS 1 & 2: 1
SUM OF ALL RESPONSES TO PHQ QUESTIONS 1-9: 1
SUM OF ALL RESPONSES TO PHQ QUESTIONS 1-9: 1
1. LITTLE INTEREST OR PLEASURE IN DOING THINGS: NOT AT ALL
6. FEELING BAD ABOUT YOURSELF - OR THAT YOU ARE A FAILURE OR HAVE LET YOURSELF OR YOUR FAMILY DOWN: NOT AT ALL

## 2024-07-29 ASSESSMENT — ENCOUNTER SYMPTOMS
BACK PAIN: 0
ABDOMINAL PAIN: 0
SHORTNESS OF BREATH: 0
COUGH: 0

## 2024-07-29 NOTE — PROGRESS NOTES
Never   Substance Use Topics    Alcohol use: Not Currently     Comment: Its been over three years since my last drink      Current Outpatient Medications   Medication Sig Dispense Refill    glipiZIDE (GLUCOTROL) 10 MG tablet Take 1 tablet by mouth 2 times daily 60 tablet 3    insulin glargine (BASAGLAR KWIKPEN) 100 UNIT/ML injection pen Inject 10 Units into the skin nightly 5 Adjustable Dose Pre-filled Pen Syringe 0    Insulin Pen Needle 32G X 4 MM MISC 1 each by Does not apply route daily 100 each 3    Liraglutide (VICTOZA) 18 MG/3ML SOPN SC injection Inject 1.8 mg into the skin daily 2 Adjustable Dose Pre-filled Pen Syringe 3    ondansetron (ZOFRAN) 4 MG tablet Take 1 tablet by mouth 3 times daily as needed for Nausea or Vomiting 15 tablet 0    acetaminophen (TYLENOL) 500 MG tablet Take 2 tablets by mouth every 6 hours as needed for Pain 60 tablet 1    blood glucose monitor strips Test two times a day & as needed for symptoms of irregular blood glucose. Dispense sufficient amount for indicated testing frequency plus additional to accommodate PRN testing needs. 300 strip 3    Lancets MISC 1 each by Does not apply route 2 times daily 300 each 3    calcium carbonate (TUMS) 500 MG chewable tablet Take 1 tablet by mouth as needed for Heartburn      EPINEPHrine (EPIPEN 2-NARCISO) 0.3 MG/0.3ML SOAJ injection Inject 0.3 mLs into the muscle once for 1 dose Use as directed for allergic reaction 2 each 0     No current facility-administered medications for this visit.     Allergies   Allergen Reactions    Azithromycin Anaphylaxis    Bee Venom Anaphylaxis    Penicillins Anaphylaxis and Hives    Propoxyphene Anaphylaxis    Bupropion Hives    Gabapentin      [place] helicopters     Levofloxacin Hives    Motrin [Ibuprofen] Nausea Only    Paroxetine Hcl Hives    Rofecoxib Nausea Only     Reaction to VIoxx    Zoloft [Sertraline Hcl] Itching     With rash    Cephalexin Rash     And cough       Health Maintenance   Topic Date Due

## 2024-08-14 RX ORDER — INSULIN GLARGINE 100 [IU]/ML
10 INJECTION, SOLUTION SUBCUTANEOUS NIGHTLY
Qty: 10 ML | Refills: 3 | Status: SHIPPED | OUTPATIENT
Start: 2024-08-14

## 2024-09-24 DIAGNOSIS — E11.9 TYPE 2 DIABETES MELLITUS WITHOUT COMPLICATION, WITHOUT LONG-TERM CURRENT USE OF INSULIN (HCC): Primary | ICD-10-CM

## 2024-09-24 DIAGNOSIS — E11.9 TYPE 2 DIABETES MELLITUS WITHOUT COMPLICATION, WITHOUT LONG-TERM CURRENT USE OF INSULIN (HCC): ICD-10-CM

## 2024-09-24 RX ORDER — INSULIN GLARGINE 100 [IU]/ML
10 INJECTION, SOLUTION SUBCUTANEOUS NIGHTLY
Qty: 5 ADJUSTABLE DOSE PRE-FILLED PEN SYRINGE | Refills: 0 | Status: SHIPPED | OUTPATIENT
Start: 2024-09-24 | End: 2024-09-24

## 2024-09-24 RX ORDER — INSULIN GLARGINE 100 [IU]/ML
10 INJECTION, SOLUTION SUBCUTANEOUS NIGHTLY
Qty: 5 ADJUSTABLE DOSE PRE-FILLED PEN SYRINGE | Refills: 0 | Status: SHIPPED | OUTPATIENT
Start: 2024-09-24

## 2024-09-24 RX ORDER — LIRAGLUTIDE 6 MG/ML
1.8 INJECTION SUBCUTANEOUS DAILY
Qty: 2 ADJUSTABLE DOSE PRE-FILLED PEN SYRINGE | Refills: 3 | Status: SHIPPED | OUTPATIENT
Start: 2024-09-24

## 2024-10-21 RX ORDER — ONDANSETRON 4 MG/1
TABLET, FILM COATED ORAL
Qty: 15 TABLET | Refills: 0 | Status: SHIPPED | OUTPATIENT
Start: 2024-10-21

## 2024-10-26 DIAGNOSIS — E11.9 TYPE 2 DIABETES MELLITUS WITHOUT COMPLICATION, WITHOUT LONG-TERM CURRENT USE OF INSULIN (HCC): ICD-10-CM

## 2024-10-28 RX ORDER — ACYCLOVIR 400 MG/1
TABLET ORAL
Qty: 3 EACH | Refills: 5 | Status: SHIPPED | OUTPATIENT
Start: 2024-10-28

## 2024-10-30 ENCOUNTER — OFFICE VISIT (OUTPATIENT)
Dept: PRIMARY CARE CLINIC | Age: 39
End: 2024-10-30
Payer: COMMERCIAL

## 2024-10-30 ENCOUNTER — HOSPITAL ENCOUNTER (OUTPATIENT)
Age: 39
Setting detail: SPECIMEN
Discharge: HOME | End: 2024-10-30

## 2024-10-30 VITALS
BODY MASS INDEX: 45.99 KG/M2 | HEART RATE: 77 BPM | WEIGHT: 293 LBS | HEIGHT: 67 IN | RESPIRATION RATE: 16 BRPM | OXYGEN SATURATION: 98 % | SYSTOLIC BLOOD PRESSURE: 122 MMHG | DIASTOLIC BLOOD PRESSURE: 84 MMHG

## 2024-10-30 DIAGNOSIS — Z13.29 SCREENING FOR THYROID DISORDER: ICD-10-CM

## 2024-10-30 DIAGNOSIS — Z13.0 SCREENING FOR DEFICIENCY ANEMIA: ICD-10-CM

## 2024-10-30 DIAGNOSIS — E78.5 HYPERLIPIDEMIA, UNSPECIFIED HYPERLIPIDEMIA TYPE: ICD-10-CM

## 2024-10-30 DIAGNOSIS — E11.9 TYPE 2 DIABETES MELLITUS WITHOUT COMPLICATION, WITHOUT LONG-TERM CURRENT USE OF INSULIN (HCC): Primary | ICD-10-CM

## 2024-10-30 DIAGNOSIS — E11.9 TYPE 2 DIABETES MELLITUS WITHOUT COMPLICATION, WITHOUT LONG-TERM CURRENT USE OF INSULIN (HCC): ICD-10-CM

## 2024-10-30 DIAGNOSIS — D72.829 LEUKOCYTOSIS, UNSPECIFIED TYPE: Primary | ICD-10-CM

## 2024-10-30 DIAGNOSIS — M54.41 CHRONIC LOW BACK PAIN WITH BILATERAL SCIATICA, UNSPECIFIED BACK PAIN LATERALITY: ICD-10-CM

## 2024-10-30 DIAGNOSIS — G89.29 CHRONIC LOW BACK PAIN WITH BILATERAL SCIATICA, UNSPECIFIED BACK PAIN LATERALITY: ICD-10-CM

## 2024-10-30 DIAGNOSIS — F41.9 ANXIETY: ICD-10-CM

## 2024-10-30 DIAGNOSIS — G43.009 MIGRAINE WITHOUT AURA AND WITHOUT STATUS MIGRAINOSUS, NOT INTRACTABLE: ICD-10-CM

## 2024-10-30 DIAGNOSIS — M54.42 CHRONIC LOW BACK PAIN WITH BILATERAL SCIATICA, UNSPECIFIED BACK PAIN LATERALITY: ICD-10-CM

## 2024-10-30 LAB
ALBUMIN SERPL-MCNC: 3.8 G/DL (ref 3.5–5.2)
ALBUMIN/GLOB SERPL: 1.1 {RATIO} (ref 1–2.5)
ALP SERPL-CCNC: 84 U/L (ref 35–104)
ALT SERPL-CCNC: 13 U/L (ref 10–35)
ANION GAP SERPL CALCULATED.3IONS-SCNC: 11 MMOL/L (ref 9–16)
AST SERPL-CCNC: 14 U/L (ref 10–35)
BILIRUB SERPL-MCNC: 0.4 MG/DL (ref 0–1.2)
BUN SERPL-MCNC: 12 MG/DL (ref 6–20)
CALCIUM SERPL-MCNC: 9.3 MG/DL (ref 8.6–10.4)
CHLORIDE SERPL-SCNC: 103 MMOL/L (ref 98–107)
CHOLEST SERPL-MCNC: 111 MG/DL (ref 0–199)
CHOLESTEROL/HDL RATIO: 2.8
CO2 SERPL-SCNC: 25 MMOL/L (ref 20–31)
CREAT SERPL-MCNC: 0.8 MG/DL (ref 0.6–0.9)
ERYTHROCYTE [DISTWIDTH] IN BLOOD BY AUTOMATED COUNT: 14.2 % (ref 11.8–14.4)
GFR, ESTIMATED: >90 ML/MIN/1.73M2
GLUCOSE SERPL-MCNC: 183 MG/DL (ref 74–99)
HBA1C MFR BLD: 8.1 %
HCT VFR BLD AUTO: 44.2 % (ref 36.3–47.1)
HDLC SERPL-MCNC: 40 MG/DL
HGB BLD-MCNC: 13.7 G/DL (ref 11.9–15.1)
LDLC SERPL CALC-MCNC: 46 MG/DL (ref 0–100)
MCH RBC QN AUTO: 26.2 PG (ref 25.2–33.5)
MCHC RBC AUTO-ENTMCNC: 31 G/DL (ref 28.4–34.8)
MCV RBC AUTO: 84.5 FL (ref 82.6–102.9)
NRBC BLD-RTO: 0 PER 100 WBC
PLATELET # BLD AUTO: 280 K/UL (ref 138–453)
PMV BLD AUTO: 9.1 FL (ref 8.1–13.5)
POTASSIUM SERPL-SCNC: 4.1 MMOL/L (ref 3.7–5.3)
PROT SERPL-MCNC: 7.3 G/DL (ref 6.6–8.7)
RBC # BLD AUTO: 5.23 M/UL (ref 3.95–5.11)
SODIUM SERPL-SCNC: 139 MMOL/L (ref 136–145)
TRIGL SERPL-MCNC: 125 MG/DL
TSH SERPL DL<=0.05 MIU/L-ACNC: 1.24 UIU/ML (ref 0.27–4.2)
VLDLC SERPL CALC-MCNC: 25 MG/DL (ref 1–30)
WBC OTHER # BLD: 12.3 K/UL (ref 3.5–11.3)

## 2024-10-30 PROCEDURE — 99214 OFFICE O/P EST MOD 30 MIN: CPT | Performed by: NURSE PRACTITIONER

## 2024-10-30 PROCEDURE — 83036 HEMOGLOBIN GLYCOSYLATED A1C: CPT | Performed by: NURSE PRACTITIONER

## 2024-10-30 PROCEDURE — 3052F HG A1C>EQUAL 8.0%<EQUAL 9.0%: CPT | Performed by: NURSE PRACTITIONER

## 2024-10-30 RX ORDER — LORATADINE 10 MG/1
10 TABLET ORAL DAILY
Qty: 30 TABLET | Refills: 3 | Status: SHIPPED | OUTPATIENT
Start: 2024-10-30

## 2024-10-30 RX ORDER — INSULIN GLARGINE 100 [IU]/ML
20 INJECTION, SOLUTION SUBCUTANEOUS NIGHTLY
Qty: 8 ADJUSTABLE DOSE PRE-FILLED PEN SYRINGE | Refills: 0 | Status: SHIPPED | OUTPATIENT
Start: 2024-10-30

## 2024-10-30 RX ORDER — ONDANSETRON 4 MG/1
4 TABLET, FILM COATED ORAL EVERY 8 HOURS PRN
Qty: 15 TABLET | Refills: 0 | Status: SHIPPED | OUTPATIENT
Start: 2024-10-30

## 2024-10-30 SDOH — ECONOMIC STABILITY: FOOD INSECURITY: WITHIN THE PAST 12 MONTHS, THE FOOD YOU BOUGHT JUST DIDN'T LAST AND YOU DIDN'T HAVE MONEY TO GET MORE.: NEVER TRUE

## 2024-10-30 SDOH — ECONOMIC STABILITY: INCOME INSECURITY: HOW HARD IS IT FOR YOU TO PAY FOR THE VERY BASICS LIKE FOOD, HOUSING, MEDICAL CARE, AND HEATING?: NOT HARD AT ALL

## 2024-10-30 SDOH — ECONOMIC STABILITY: FOOD INSECURITY: WITHIN THE PAST 12 MONTHS, YOU WORRIED THAT YOUR FOOD WOULD RUN OUT BEFORE YOU GOT MONEY TO BUY MORE.: NEVER TRUE

## 2024-10-30 ASSESSMENT — ENCOUNTER SYMPTOMS
COUGH: 0
BACK PAIN: 0
SHORTNESS OF BREATH: 0
SORE THROAT: 1
ABDOMINAL PAIN: 0

## 2024-10-30 ASSESSMENT — PATIENT HEALTH QUESTIONNAIRE - PHQ9
SUM OF ALL RESPONSES TO PHQ QUESTIONS 1-9: 8
6. FEELING BAD ABOUT YOURSELF - OR THAT YOU ARE A FAILURE OR HAVE LET YOURSELF OR YOUR FAMILY DOWN: NOT AT ALL
10. IF YOU CHECKED OFF ANY PROBLEMS, HOW DIFFICULT HAVE THESE PROBLEMS MADE IT FOR YOU TO DO YOUR WORK, TAKE CARE OF THINGS AT HOME, OR GET ALONG WITH OTHER PEOPLE: SOMEWHAT DIFFICULT
8. MOVING OR SPEAKING SO SLOWLY THAT OTHER PEOPLE COULD HAVE NOTICED. OR THE OPPOSITE, BEING SO FIGETY OR RESTLESS THAT YOU HAVE BEEN MOVING AROUND A LOT MORE THAN USUAL: SEVERAL DAYS
3. TROUBLE FALLING OR STAYING ASLEEP: NEARLY EVERY DAY
7. TROUBLE CONCENTRATING ON THINGS, SUCH AS READING THE NEWSPAPER OR WATCHING TELEVISION: NOT AT ALL
4. FEELING TIRED OR HAVING LITTLE ENERGY: NEARLY EVERY DAY
SUM OF ALL RESPONSES TO PHQ QUESTIONS 1-9: 8
2. FEELING DOWN, DEPRESSED OR HOPELESS: NOT AT ALL
SUM OF ALL RESPONSES TO PHQ9 QUESTIONS 1 & 2: 0
SUM OF ALL RESPONSES TO PHQ QUESTIONS 1-9: 8
9. THOUGHTS THAT YOU WOULD BE BETTER OFF DEAD, OR OF HURTING YOURSELF: NOT AT ALL
5. POOR APPETITE OR OVEREATING: SEVERAL DAYS
SUM OF ALL RESPONSES TO PHQ QUESTIONS 1-9: 8
1. LITTLE INTEREST OR PLEASURE IN DOING THINGS: NOT AT ALL

## 2024-10-30 NOTE — PROGRESS NOTES
[facility]  [facility] PRIMARY CARE  [address] [place] [address] [address]  [address]  [facility] [address]  Dept: [phone]  Dept Fax: [phone]    Aide Ariza is a 39 y.o. female who presentstoday for her medical conditions/complaints as noted below.  Aide Ariza is c/o of  Chief Complaint   Patient presents with    Diabetes         HPI:     Presents with daughter for 3 month recheck  BP well controlled  Has lost 5lb since LOV- congratulated patient    Hga1c from 8 to 8.1  Compliant with meds  Denies any side effects  Will increase basaglar to 20units nightly    Willing to update annual labs    C/o sore throat x 1 day  Rx given for claritin    Denies any other problems/concerns        Hemoglobin A1C (%)   Date Value   10/30/2024 8.1 (H)   07/29/2024 8.0   04/29/2024 7.2             ( goal A1C is < 7)   No components found for: \"LABMICR\"  No components found for: \"LDLCHOLESTEROL\", \"LDLCALC\"    (goal LDL is <100)   AST (U/L)   Date Value   05/03/2023 11     ALT (U/L)   Date Value   05/03/2023 13     BUN (mg/dL)   Date Value   08/23/2023 12     BP Readings from Last 3 Encounters:   10/30/24 122/84   07/29/24 124/82   04/30/24 120/81          ([id number])    Past Medical History:   Diagnosis Date    Anxiety     Arthritis     Bipolar disorder (HCC)     Chronic back pain 2013    Complication of anesthesia     It has been known for my breathing (oxygen) to be low after surgeries.    Depression     Disc disease, degenerative, lumbar or lumbosacral 2002    Disc disease, degenerative, lumbar or lumbosacral     Drug effect     Alot of medication allergies    Fibromyalgia 2012    GERD (gastroesophageal reflux disease)     IBS (irritable bowel syndrome)     Insulin resistance 07/21/2020    Migraine     Migraines     Obesity     Overactive bladder     Sometimes it feels like i have to go really bad but then i barely go. Other times i am always going    PONV (postoperative nausea and vomiting)      GOAL: Pt will transfer sit to/from stand with least restrictive device as appropriate independently within 4 weeks

## 2025-01-16 DIAGNOSIS — E11.9 TYPE 2 DIABETES MELLITUS WITHOUT COMPLICATION, WITHOUT LONG-TERM CURRENT USE OF INSULIN (HCC): ICD-10-CM

## 2025-01-16 RX ORDER — LIRAGLUTIDE 6 MG/ML
INJECTION SUBCUTANEOUS
Qty: 9 ADJUSTABLE DOSE PRE-FILLED PEN SYRINGE | Refills: 7 | Status: SHIPPED | OUTPATIENT
Start: 2025-01-16

## 2025-01-30 ENCOUNTER — OFFICE VISIT (OUTPATIENT)
Dept: PRIMARY CARE CLINIC | Age: 40
End: 2025-01-30
Payer: COMMERCIAL

## 2025-01-30 VITALS
WEIGHT: 293 LBS | HEIGHT: 67 IN | DIASTOLIC BLOOD PRESSURE: 82 MMHG | HEART RATE: 68 BPM | BODY MASS INDEX: 45.99 KG/M2 | OXYGEN SATURATION: 99 % | SYSTOLIC BLOOD PRESSURE: 124 MMHG

## 2025-01-30 DIAGNOSIS — G89.29 CHRONIC LOW BACK PAIN WITH BILATERAL SCIATICA, UNSPECIFIED BACK PAIN LATERALITY: ICD-10-CM

## 2025-01-30 DIAGNOSIS — M54.42 CHRONIC LOW BACK PAIN WITH BILATERAL SCIATICA, UNSPECIFIED BACK PAIN LATERALITY: ICD-10-CM

## 2025-01-30 DIAGNOSIS — F41.9 ANXIETY: ICD-10-CM

## 2025-01-30 DIAGNOSIS — E11.9 TYPE 2 DIABETES MELLITUS WITHOUT COMPLICATION, WITHOUT LONG-TERM CURRENT USE OF INSULIN (HCC): Primary | ICD-10-CM

## 2025-01-30 DIAGNOSIS — E78.5 HYPERLIPIDEMIA, UNSPECIFIED HYPERLIPIDEMIA TYPE: ICD-10-CM

## 2025-01-30 DIAGNOSIS — G43.009 MIGRAINE WITHOUT AURA AND WITHOUT STATUS MIGRAINOSUS, NOT INTRACTABLE: ICD-10-CM

## 2025-01-30 DIAGNOSIS — M54.41 CHRONIC LOW BACK PAIN WITH BILATERAL SCIATICA, UNSPECIFIED BACK PAIN LATERALITY: ICD-10-CM

## 2025-01-30 LAB — HBA1C MFR BLD: 8.4 %

## 2025-01-30 PROCEDURE — 83036 HEMOGLOBIN GLYCOSYLATED A1C: CPT | Performed by: NURSE PRACTITIONER

## 2025-01-30 PROCEDURE — 99214 OFFICE O/P EST MOD 30 MIN: CPT | Performed by: NURSE PRACTITIONER

## 2025-01-30 PROCEDURE — 3052F HG A1C>EQUAL 8.0%<EQUAL 9.0%: CPT | Performed by: NURSE PRACTITIONER

## 2025-01-30 SDOH — ECONOMIC STABILITY: FOOD INSECURITY: WITHIN THE PAST 12 MONTHS, THE FOOD YOU BOUGHT JUST DIDN'T LAST AND YOU DIDN'T HAVE MONEY TO GET MORE.: NEVER TRUE

## 2025-01-30 SDOH — ECONOMIC STABILITY: FOOD INSECURITY: WITHIN THE PAST 12 MONTHS, YOU WORRIED THAT YOUR FOOD WOULD RUN OUT BEFORE YOU GOT MONEY TO BUY MORE.: NEVER TRUE

## 2025-01-30 ASSESSMENT — ENCOUNTER SYMPTOMS
BACK PAIN: 0
COUGH: 0
ABDOMINAL PAIN: 0
SHORTNESS OF BREATH: 0

## 2025-01-30 ASSESSMENT — PATIENT HEALTH QUESTIONNAIRE - PHQ9
9. THOUGHTS THAT YOU WOULD BE BETTER OFF DEAD, OR OF HURTING YOURSELF: NOT AT ALL
6. FEELING BAD ABOUT YOURSELF - OR THAT YOU ARE A FAILURE OR HAVE LET YOURSELF OR YOUR FAMILY DOWN: NOT AT ALL
3. TROUBLE FALLING OR STAYING ASLEEP: NOT AT ALL
5. POOR APPETITE OR OVEREATING: NOT AT ALL
4. FEELING TIRED OR HAVING LITTLE ENERGY: NOT AT ALL
SUM OF ALL RESPONSES TO PHQ QUESTIONS 1-9: 0
1. LITTLE INTEREST OR PLEASURE IN DOING THINGS: NOT AT ALL
2. FEELING DOWN, DEPRESSED OR HOPELESS: NOT AT ALL
SUM OF ALL RESPONSES TO PHQ QUESTIONS 1-9: 0
SUM OF ALL RESPONSES TO PHQ QUESTIONS 1-9: 0
7. TROUBLE CONCENTRATING ON THINGS, SUCH AS READING THE NEWSPAPER OR WATCHING TELEVISION: NOT AT ALL
SUM OF ALL RESPONSES TO PHQ QUESTIONS 1-9: 0
SUM OF ALL RESPONSES TO PHQ9 QUESTIONS 1 & 2: 0
10. IF YOU CHECKED OFF ANY PROBLEMS, HOW DIFFICULT HAVE THESE PROBLEMS MADE IT FOR YOU TO DO YOUR WORK, TAKE CARE OF THINGS AT HOME, OR GET ALONG WITH OTHER PEOPLE: NOT DIFFICULT AT ALL
8. MOVING OR SPEAKING SO SLOWLY THAT OTHER PEOPLE COULD HAVE NOTICED. OR THE OPPOSITE, BEING SO FIGETY OR RESTLESS THAT YOU HAVE BEEN MOVING AROUND A LOT MORE THAN USUAL: NOT AT ALL

## 2025-01-30 NOTE — PROGRESS NOTES
[facility]  [facility] PRIMARY CARE  [address] [place] [address] [address]  [address]  [facility] [address]  Dept: [phone]  Dept Fax: [phone]    Aide Ariza is a 39 y.o. female who presentstoday for her medical conditions/complaints as noted below.  Aide Ariza is c/o of  Chief Complaint   Patient presents with    Medication Refill     refills           HPI:     Presents for 3 month recheck on chronic conditions  BP well controlled  Has lost 2lb since LOV  Working on diet/exercise    Overall feeling well  Headaches improved but still present    Hga1c from 8.1 to 8.4  Has been trying to cut back on carbs  Will food journal  Encouraged 150 minutes of exercise daily  Compliant with meds  Also use CGM    Anxiety slightly increased over the last 2 weeks  Feels she is able to manage    Denies any other problems/concerns      Hemoglobin A1C (%)   Date Value   10/30/2024 8.1 (H)   07/29/2024 8.0   04/29/2024 7.2             ( goal A1C is < 7)   No components found for: \"LABMICR\"  No components found for: \"LDLCHOLESTEROL\", \"LDLCALC\"    (goal LDL is <100)   AST (U/L)   Date Value   10/30/2024 14     ALT (U/L)   Date Value   10/30/2024 13     BUN (mg/dL)   Date Value   10/30/2024 12     BP Readings from Last 3 Encounters:   01/30/25 124/82   10/30/24 122/84   07/29/24 124/82          ([id number])    Past Medical History:   Diagnosis Date    Anxiety     Arthritis     Bipolar disorder (HCC)     Chronic back pain 2013    Complication of anesthesia     It has been known for my breathing (oxygen) to be low after surgeries.    Depression     Disc disease, degenerative, lumbar or lumbosacral 2002    Disc disease, degenerative, lumbar or lumbosacral     Drug effect     Alot of medication allergies    Fibromyalgia 2012    GERD (gastroesophageal reflux disease)     IBS (irritable bowel syndrome)     Insulin resistance 07/21/2020    Migraine     Migraines     Obesity     Overactive bladder     Sometimes it

## 2025-02-06 ENCOUNTER — HOSPITAL ENCOUNTER (OUTPATIENT)
Age: 40
Setting detail: SPECIMEN
Discharge: HOME | End: 2025-02-06

## 2025-02-06 DIAGNOSIS — D72.829 LEUKOCYTOSIS, UNSPECIFIED TYPE: ICD-10-CM

## 2025-02-06 LAB
ERYTHROCYTE [DISTWIDTH] IN BLOOD BY AUTOMATED COUNT: 13.6 % (ref 11.8–14.4)
HCT VFR BLD AUTO: 43.7 % (ref 36.3–47.1)
HGB BLD-MCNC: 13.6 G/DL (ref 11.9–15.1)
MCH RBC QN AUTO: 26.7 PG (ref 25.2–33.5)
MCHC RBC AUTO-ENTMCNC: 31.1 G/DL (ref 28.4–34.8)
MCV RBC AUTO: 85.9 FL (ref 82.6–102.9)
NRBC BLD-RTO: 0 PER 100 WBC
PLATELET # BLD AUTO: 280 K/UL (ref 138–453)
PMV BLD AUTO: 9.2 FL (ref 8.1–13.5)
RBC # BLD AUTO: 5.09 M/UL (ref 3.95–5.11)
WBC OTHER # BLD: 11.2 K/UL (ref 3.5–11.3)

## 2025-02-17 ENCOUNTER — OFFICE VISIT (OUTPATIENT)
Dept: OBGYN CLINIC | Age: 40
End: 2025-02-17
Payer: COMMERCIAL

## 2025-02-17 VITALS
BODY MASS INDEX: 45.99 KG/M2 | WEIGHT: 293 LBS | HEIGHT: 67 IN | SYSTOLIC BLOOD PRESSURE: 124 MMHG | DIASTOLIC BLOOD PRESSURE: 70 MMHG

## 2025-02-17 DIAGNOSIS — Z90.710 H/O: HYSTERECTOMY: ICD-10-CM

## 2025-02-17 DIAGNOSIS — R10.32 LLQ PAIN: Primary | ICD-10-CM

## 2025-02-17 DIAGNOSIS — E28.2 PCOS (POLYCYSTIC OVARIAN SYNDROME): ICD-10-CM

## 2025-02-17 PROCEDURE — 99213 OFFICE O/P EST LOW 20 MIN: CPT | Performed by: OBSTETRICS & GYNECOLOGY

## 2025-02-17 RX ORDER — TRAMADOL HYDROCHLORIDE 50 MG/1
50 TABLET ORAL EVERY 8 HOURS PRN
Qty: 20 TABLET | Refills: 0 | Status: SHIPPED | OUTPATIENT
Start: 2025-02-17 | End: 2025-02-27

## 2025-02-17 RX ORDER — CYCLOBENZAPRINE HCL 10 MG
10 TABLET ORAL 2 TIMES DAILY PRN
Qty: 30 TABLET | Refills: 0 | Status: SHIPPED | OUTPATIENT
Start: 2025-02-17

## 2025-02-17 NOTE — PROGRESS NOTES
Aide Ariza  2025    YOB: 1985    The patient was seen today. She is here regarding LLQ pain that started approximately 1 year ago. She is voiding without difficulty.     HPI:  Aide Ariza is a 39 y.o. female      Pt. Had RALH w/ ovarian preservation 2023.  She states that 3 months after procedure she started to have LLQ pain.      She has history of IBS, PCOS, elevated BMI.    She states that this pain comes and goes.  She states that it is sharp and stabbing.  She states it is worse with moving and sitting.      Discussed chiropractor or PT or PFT while working up this discomfort.  She states insurance will not cover and is declining.    She is going through psychological stress and turmoil due to fathers health.  She is declining pelvic exam today.  Denies vaginal discharge or odor.  Denies F/CH.  States bowel function is inconsistent due to IBS.  Denies bladder concern      OB History    Para Term  AB Living   6 2 2 0 4 2   SAB IAB Ectopic Molar Multiple Live Births   4 0 0 0 0 2      # Outcome Date GA Lbr Sebas/2nd Weight Sex Type Anes PTL Lv   6 SAB 18           5 Term 08    F CS-LTranv   JASON   4 SAB            3 2007           2 Term 05    F CS-LTranv   JASON      Complications: Cord around body   1 SAB                Past Medical History:   Diagnosis Date    Anxiety     Arthritis     Bipolar disorder (HCC)     Chronic back pain 2013    Complication of anesthesia     It has been known for my breathing (oxygen) to be low after surgeries.    Depression     Disc disease, degenerative, lumbar or lumbosacral 2002    Disc disease, degenerative, lumbar or lumbosacral     Drug effect     Alot of medication allergies    Fibromyalgia 2012    GERD (gastroesophageal reflux disease)     IBS (irritable bowel syndrome)     Insulin resistance 2020    Migraine     Migraines     Obesity     Overactive bladder     Sometimes it feels like i

## 2025-03-13 ENCOUNTER — TELEPHONE (OUTPATIENT)
Dept: PRIMARY CARE CLINIC | Age: 40
End: 2025-03-13

## 2025-03-13 ENCOUNTER — HOSPITAL ENCOUNTER (OUTPATIENT)
Dept: ULTRASOUND IMAGING | Age: 40
Discharge: HOME | End: 2025-03-15
Payer: COMMERCIAL

## 2025-03-13 DIAGNOSIS — R10.32 LLQ PAIN: ICD-10-CM

## 2025-03-13 DIAGNOSIS — E28.2 PCOS (POLYCYSTIC OVARIAN SYNDROME): ICD-10-CM

## 2025-03-13 PROCEDURE — 76830 TRANSVAGINAL US NON-OB: CPT

## 2025-03-30 ENCOUNTER — RESULTS FOLLOW-UP (OUTPATIENT)
Dept: OBGYN CLINIC | Age: 40
End: 2025-03-30

## 2025-04-01 ENCOUNTER — OFFICE VISIT (OUTPATIENT)
Dept: OBGYN CLINIC | Age: 40
End: 2025-04-01
Payer: COMMERCIAL

## 2025-04-01 VITALS
SYSTOLIC BLOOD PRESSURE: 118 MMHG | WEIGHT: 293 LBS | DIASTOLIC BLOOD PRESSURE: 70 MMHG | HEIGHT: 67 IN | BODY MASS INDEX: 45.99 KG/M2

## 2025-04-01 DIAGNOSIS — R10.9 LEFT SIDED ABDOMINAL PAIN: Primary | ICD-10-CM

## 2025-04-01 DIAGNOSIS — R68.82 DECREASED LIBIDO: ICD-10-CM

## 2025-04-01 DIAGNOSIS — Z90.710 H/O: HYSTERECTOMY: ICD-10-CM

## 2025-04-01 DIAGNOSIS — B37.2 YEAST INFECTION OF THE SKIN: ICD-10-CM

## 2025-04-01 PROCEDURE — 99214 OFFICE O/P EST MOD 30 MIN: CPT | Performed by: OBSTETRICS & GYNECOLOGY

## 2025-04-01 RX ORDER — FLUCONAZOLE 150 MG/1
150 TABLET ORAL
Qty: 2 TABLET | Refills: 0 | Status: SHIPPED | OUTPATIENT
Start: 2025-04-01 | End: 2025-04-07

## 2025-04-01 RX ORDER — FLUCONAZOLE 150 MG/1
150 TABLET ORAL
Qty: 2 TABLET | Refills: 0 | Status: SHIPPED | OUTPATIENT
Start: 2025-04-01

## 2025-04-01 RX ORDER — NYSTATIN 100000 [USP'U]/G
POWDER TOPICAL
Qty: 30 G | Refills: 1 | Status: SHIPPED | OUTPATIENT
Start: 2025-04-01

## 2025-04-01 NOTE — PROGRESS NOTES
Aide Ariza  2025    YOB: 1985    The patient was seen today. She is here regarding Left sided abdominal pain. Her bowels are regular and she is voiding without difficulty.     HPI:  Aide Ariza is a 40 y.o. female      Pt. States she has been on weight loss journey and feels she is hitting a plateau.  Her main concern today is left sided abdominal pain.  She states that it has worsened and she is noticing a bulge in the LUQ.  She states that the pain is sharp.   She does admit to constipation and IBS.   The pain is on and off and will be stabbing at times.  It is not relieved by NSAIDs.    She also is concerned with yeast infection in the folds of her skin as she loses weight.  She states red and itchy.  Hygiene discussed and treatment recommended.    She denies F/CH, denies bladder issues.  She denies vaginal discharge or bleeding.  She denies pelvic pain.  She is declining pelvic exam and cultures today.    She also is concerned with decreased libido.  She has been having increase in anxiety and stress with her father having a new death cardiac experience and many bills piling up.  We discussed hormonal changes and checking hormones.  We also discussed relationship barriers and importance of couples connecting and having healthy communication.  We discussed mental and emotional health as libido is almost always multifactorial.         OB History    Para Term  AB Living   6 2 2 0 4 2   SAB IAB Ectopic Molar Multiple Live Births   4 0 0 0 0 2      # Outcome Date GA Lbr Sebas/2nd Weight Sex Type Anes PTL Lv   6 SAB 18           5 Term 08    F CS-LTranv   JASON   4 SAB            3 2007           2 Term 05    F CS-LTranv   JASON      Complications: Cord around body   1 SAB                Past Medical History:   Diagnosis Date    Anxiety     Arthritis     Bipolar disorder (HCC)     Chronic back pain 2013    Complication of anesthesia     It

## 2025-04-02 ENCOUNTER — HOSPITAL ENCOUNTER (OUTPATIENT)
Age: 40
Setting detail: SPECIMEN
Discharge: HOME | End: 2025-04-02

## 2025-04-02 ENCOUNTER — RESULTS FOLLOW-UP (OUTPATIENT)
Dept: OBGYN CLINIC | Age: 40
End: 2025-04-02

## 2025-04-02 DIAGNOSIS — R68.82 DECREASED LIBIDO: ICD-10-CM

## 2025-04-02 LAB
25(OH)D3 SERPL-MCNC: <6 NG/ML (ref 30–100)
DHEA-S SERPL-MCNC: 105 UG/DL (ref 60.9–337)
EST. AVERAGE GLUCOSE BLD GHB EST-MCNC: 174 MG/DL
ESTRADIOL LEVEL: 58.2 PG/ML
FSH SERPL-ACNC: 6.1 MIU/ML
HBA1C MFR BLD: 7.7 % (ref 4–6)
LH SERPL-ACNC: 6.1 MIU/ML (ref 1.7–8.6)
PROGEST SERPL-MCNC: <0.05 NG/ML
PROLACTIN SERPL-MCNC: 7.69 NG/ML (ref 4.79–23.3)
SHBG SERPL-SCNC: 26 NMOL/L (ref 25–122)
T4 FREE SERPL-MCNC: 1.3 NG/DL (ref 0.92–1.68)
TESTOST FREE MFR SERPL: 8.8 PG/ML (ref 1.3–9.2)
TESTOST SERPL-MCNC: 43 NG/DL (ref 8–48)
TSH SERPL DL<=0.05 MIU/L-ACNC: 1.01 UIU/ML (ref 0.27–4.2)

## 2025-04-05 DIAGNOSIS — R10.32 LLQ PAIN: ICD-10-CM

## 2025-04-05 LAB — T3 REVERSE: 18 NG/DL (ref 9–27)

## 2025-04-07 LAB — ESTRONE SERPL-MCNC: 58.3 PG/ML

## 2025-04-08 ENCOUNTER — PATIENT MESSAGE (OUTPATIENT)
Dept: PRIMARY CARE CLINIC | Age: 40
End: 2025-04-08

## 2025-04-08 ENCOUNTER — E-VISIT (OUTPATIENT)
Dept: PRIMARY CARE CLINIC | Age: 40
End: 2025-04-08
Payer: COMMERCIAL

## 2025-04-08 DIAGNOSIS — J06.9 UPPER RESPIRATORY TRACT INFECTION, UNSPECIFIED TYPE: Primary | ICD-10-CM

## 2025-04-08 PROCEDURE — 99422 OL DIG E/M SVC 11-20 MIN: CPT | Performed by: NURSE PRACTITIONER

## 2025-04-08 RX ORDER — METHYLPREDNISOLONE 4 MG/1
TABLET ORAL
Qty: 1 KIT | Refills: 0 | Status: SHIPPED | OUTPATIENT
Start: 2025-04-08 | End: 2025-04-14

## 2025-04-08 RX ORDER — DOXYCYCLINE HYCLATE 100 MG
100 TABLET ORAL 2 TIMES DAILY
Qty: 20 TABLET | Refills: 0 | Status: SHIPPED | OUTPATIENT
Start: 2025-04-08 | End: 2025-04-18

## 2025-04-08 NOTE — PROGRESS NOTES
Reviewed questionnaire    Reviewed meds/allergies    Dx URI    Plan Rx given for doxy and medrol dose pack, follow up in office if no improvement    Time spent on visit 11 min

## 2025-04-09 RX ORDER — CYCLOBENZAPRINE HCL 10 MG
10 TABLET ORAL 2 TIMES DAILY PRN
Qty: 30 TABLET | Refills: 0 | Status: SHIPPED | OUTPATIENT
Start: 2025-04-09

## 2025-04-30 ENCOUNTER — OFFICE VISIT (OUTPATIENT)
Dept: PRIMARY CARE CLINIC | Age: 40
End: 2025-04-30
Payer: COMMERCIAL

## 2025-04-30 VITALS
OXYGEN SATURATION: 96 % | SYSTOLIC BLOOD PRESSURE: 126 MMHG | HEIGHT: 67 IN | WEIGHT: 293 LBS | BODY MASS INDEX: 45.99 KG/M2 | HEART RATE: 81 BPM | DIASTOLIC BLOOD PRESSURE: 76 MMHG

## 2025-04-30 DIAGNOSIS — Z00.00 ENCOUNTER FOR GENERAL ADULT MEDICAL EXAMINATION W/O ABNORMAL FINDINGS: Primary | ICD-10-CM

## 2025-04-30 DIAGNOSIS — E11.9 TYPE 2 DIABETES MELLITUS WITHOUT COMPLICATION, WITHOUT LONG-TERM CURRENT USE OF INSULIN (HCC): ICD-10-CM

## 2025-04-30 DIAGNOSIS — Z12.31 VISIT FOR SCREENING MAMMOGRAM: ICD-10-CM

## 2025-04-30 PROCEDURE — 99396 PREV VISIT EST AGE 40-64: CPT | Performed by: NURSE PRACTITIONER

## 2025-04-30 ASSESSMENT — ENCOUNTER SYMPTOMS
SHORTNESS OF BREATH: 0
BACK PAIN: 0
ABDOMINAL PAIN: 0
COUGH: 0

## 2025-04-30 NOTE — PROGRESS NOTES
is normal.      Breath sounds: Normal breath sounds.   Abdominal:      General: Bowel sounds are normal.      Palpations: Abdomen is soft.      Tenderness: There is no abdominal tenderness.   Musculoskeletal:         General: Normal range of motion.      Cervical back: Normal range of motion and neck supple.   Skin:     General: Skin is warm and dry.   Neurological:      Mental Status: She is alert and oriented to person, place, and time.   Psychiatric:         Behavior: Behavior normal.         Thought Content: Thought content normal.         Judgment: Judgment normal.       /76   Pulse 81   Ht 1.702 m (5' 7\")   Wt (!) 160 kg (352 lb 12.8 oz)   LMP 07/21/2023 Comment: urine preg negative  SpO2 96%   BMI 55.26 kg/m²     Assessment:       Diagnosis Orders   1. Encounter for general adult medical examination w/o abnormal findings        2. Type 2 diabetes mellitus without complication, without long-term current use of insulin (HCC)  Albumin/Creatinine Ratio, Urine      3. Visit for screening mammogram  SUNDEEP DIGITAL SCREEN W OR WO CAD BILATERAL                Plan:   Assessment & Plan   Return in about 6 months (around 10/30/2025) for Diabetes.    HM- Continue diet/exercise and current meds. Rx given for mammogram. Follow up in six months for recheck/earlier if needed.    Orders Placed This Encounter   Procedures    SUNDEEP DIGITAL SCREEN W OR WO CAD BILATERAL     Standing Status:   Future     Expected Date:   5/30/2025     Expiration Date:   4/30/2026     Reason for exam::   screening    Albumin/Creatinine Ratio, Urine     Standing Status:   Future     Expected Date:   4/30/2025     Expiration Date:   4/29/2026      No orders of the defined types were placed in this encounter.      Patient given educational materials - see patient instructions.Discussed use, benefit, and side effects of prescribed medications.  All patientquestions answered. Pt voiced understanding. Reviewed health maintenance.  Instructedto

## 2025-06-05 ENCOUNTER — HOSPITAL ENCOUNTER (OUTPATIENT)
Dept: WOMENS IMAGING | Age: 40
Discharge: HOME | End: 2025-06-07
Payer: COMMERCIAL

## 2025-06-05 DIAGNOSIS — Z12.31 VISIT FOR SCREENING MAMMOGRAM: ICD-10-CM

## 2025-06-05 PROCEDURE — 77063 BREAST TOMOSYNTHESIS BI: CPT

## 2025-06-06 ENCOUNTER — RESULTS FOLLOW-UP (OUTPATIENT)
Dept: PRIMARY CARE CLINIC | Age: 40
End: 2025-06-06

## 2025-07-14 DIAGNOSIS — E11.9 TYPE 2 DIABETES MELLITUS WITHOUT COMPLICATION, WITHOUT LONG-TERM CURRENT USE OF INSULIN (HCC): ICD-10-CM

## 2025-07-14 RX ORDER — LIRAGLUTIDE 6 MG/ML
INJECTION SUBCUTANEOUS
Qty: 18 ML | Refills: 0 | Status: SHIPPED | OUTPATIENT
Start: 2025-07-14

## 2025-07-21 ENCOUNTER — TELEPHONE (OUTPATIENT)
Dept: PRIMARY CARE CLINIC | Age: 40
End: 2025-07-21

## 2025-07-21 NOTE — TELEPHONE ENCOUNTER
Pt stopped in the office and asked if we had dexcom sensor samples. Spoke with PCP and advised ok to give Pt 2 samples. Signed out 2 samples and gave to Pt.

## 2025-07-25 ENCOUNTER — PATIENT MESSAGE (OUTPATIENT)
Dept: OBGYN CLINIC | Age: 40
End: 2025-07-25

## 2025-07-25 DIAGNOSIS — B37.2 YEAST INFECTION OF THE SKIN: ICD-10-CM

## 2025-07-25 DIAGNOSIS — R10.32 LLQ PAIN: ICD-10-CM

## 2025-07-26 RX ORDER — NYSTATIN 100000 [USP'U]/G
POWDER TOPICAL
Qty: 30 G | Refills: 1 | Status: SHIPPED | OUTPATIENT
Start: 2025-07-26

## 2025-08-09 RX ORDER — CYCLOBENZAPRINE HCL 10 MG
10 TABLET ORAL 2 TIMES DAILY PRN
Qty: 30 TABLET | Refills: 0 | Status: SHIPPED | OUTPATIENT
Start: 2025-08-09

## 2025-08-15 DIAGNOSIS — E11.9 TYPE 2 DIABETES MELLITUS WITHOUT COMPLICATION, WITHOUT LONG-TERM CURRENT USE OF INSULIN (HCC): ICD-10-CM

## 2025-08-15 RX ORDER — ACYCLOVIR 400 MG/1
TABLET ORAL
Qty: 9 EACH | Refills: 0 | Status: SHIPPED | OUTPATIENT
Start: 2025-08-15

## 2025-08-18 DIAGNOSIS — E11.9 TYPE 2 DIABETES MELLITUS WITHOUT COMPLICATION, WITHOUT LONG-TERM CURRENT USE OF INSULIN (HCC): ICD-10-CM

## 2025-08-18 RX ORDER — LIRAGLUTIDE 6 MG/ML
INJECTION SUBCUTANEOUS
Qty: 18 ML | Refills: 0 | Status: SHIPPED | OUTPATIENT
Start: 2025-08-18

## (undated) DEVICE — APPLICATOR MEDICATED 26 CC SOLUTION HI LT ORNG CHLORAPREP

## (undated) DEVICE — GLOVE ORANGE PI 7   MSG9070

## (undated) DEVICE — GLOVE SURG SZ 65 THK91MIL LTX FREE SYN POLYISOPRENE

## (undated) DEVICE — DRAPE,UNDRBUT,WHT GRAD PCH,CAPPORT,20/CS: Brand: MEDLINE

## (undated) DEVICE — INSUFFLATION NEEDLE TO ESTABLISH PNEUMOPERITONEUM.: Brand: INSUFFLATION NEEDLE

## (undated) DEVICE — SOLUTION IRRIG 1000ML 0.9% SOD CHL USP POUR PLAS BTL

## (undated) DEVICE — SUTURE VICRYL + 3-0 VLT BRN SH NDL VC316H

## (undated) DEVICE — SOLUTION SCRB 4OZ 10% POVIDONE IOD ANTIMIC BTL

## (undated) DEVICE — ELECTRODE PT RET AD L9FT HI MOIST COND ADH HYDRGEL CORDED

## (undated) DEVICE — SOLUTION IRRIG 3000ML 0.9% SOD CHL USP UROMATIC PLAS CONT

## (undated) DEVICE — SOLUTION PREP PAINT POV IOD FOR SKIN MUCOUS MEM

## (undated) DEVICE — PAD,ABDOMINAL,5"X9",ST,LF,25/BX: Brand: MEDLINE INDUSTRIES, INC.

## (undated) DEVICE — PAD,SANITARY,11 IN,MAXI,W/WINGS,N-STRL: Brand: MEDLINE

## (undated) DEVICE — GOWN,SIRUS,NONRNF,SETINSLV,XL,20/CS: Brand: MEDLINE

## (undated) DEVICE — PAD PT POS 36 IN SURGYPAD DISP

## (undated) DEVICE — GOWN,AURORA,NONREINFORCED,LARGE: Brand: MEDLINE

## (undated) DEVICE — SOLUTION IV 1000ML 0.9% SOD CHL FOR IRRIG PLAS CONT

## (undated) DEVICE — BLADELESS OBTURATOR: Brand: WECK VISTA

## (undated) DEVICE — ARM DRAPE

## (undated) DEVICE — STRAP RESTRAIN W3.5XL19IN TECLIN STRRP POS LEG DURING LITH

## (undated) DEVICE — GAUZE,SPONGE,FLUFF,6"X6.75",STRL,5/TRAY: Brand: MEDLINE

## (undated) DEVICE — SET,IRRIGATION,CYSTO,Y-TYPE,90": Brand: MEDLINE

## (undated) DEVICE — TRI-LUMEN FILTERED TUBE SET WITH ACTIVATED CHARCOAL FILTER: Brand: AIRSEAL

## (undated) DEVICE — DEVICE TISS REMOVAL XL FOR FLUID MGMT MYOSURE

## (undated) DEVICE — SEAL

## (undated) DEVICE — AGENT HEMSTAT 3GM OXIDIZED REGENERATED CELOS ABSRB FOR CONT (ORDER MULTIPLES OF 5EA)

## (undated) DEVICE — AIRSEAL 8 MM ACCESS PORT AND LOW PROFILE OBTURATOR WITH BLADELESS OPTICAL TIP, 120 MM LENGTH: Brand: AIRSEAL

## (undated) DEVICE — MHPB GEN MINOR PACK: Brand: MEDLINE INDUSTRIES, INC.

## (undated) DEVICE — FLUID MGMT SYS FLUENT KIT 6/PK

## (undated) DEVICE — LIQUIBAND RAPID ADHESIVE 36/CS 0.8ML: Brand: MEDLINE

## (undated) DEVICE — SYRINGE 20ML LL S/C 50

## (undated) DEVICE — SOLUTION SCRB 4OZ 7.5% POVIDONE IOD ANTIMIC BTL

## (undated) DEVICE — Device

## (undated) DEVICE — SUTURE VCRL + SZ 0 L27IN ABSRB WHT CT-2 1/2 CIR TAPERPOINT VCP270H

## (undated) DEVICE — UNDERPANTS MAT L/XL KNIT SEAMLESS CLR CODE WAISTBAND

## (undated) DEVICE — MHPB GYN MINOR PACK: Brand: MEDLINE INDUSTRIES, INC.

## (undated) DEVICE — SUTURE MCRYL SZ 4-0 L18IN ABSRB UD P-3 L13MM 3/8 CIR PRIM Y494G

## (undated) DEVICE — DRAPE,UTILITY,XL,4/PK,STERILE: Brand: MEDLINE

## (undated) DEVICE — SURGICEL ENDOSCP APPL

## (undated) DEVICE — VESSEL SEALER EXTEND: Brand: ENDOWRIST

## (undated) DEVICE — SUTURE MONOCRYL + SZ 4-0 L27IN ABSRB UD L19MM PS-2 3/8 CIR MCP426H

## (undated) DEVICE — SHEET DRAPE FULL 70X100

## (undated) DEVICE — MANIPULATOR UTER 3CM ULTEM PLAS CUP DELINEATOR FOR USE W/

## (undated) DEVICE — SET ENDOSCP SEAL HYSTEROSCOPE RIG OUTFLO CHN DISP MYOSURE

## (undated) DEVICE — TOTAL TRAY, 16FR 10ML SIL FOLEY, URN: Brand: MEDLINE

## (undated) DEVICE — SOLUTION ANTIFOG VIS SYS CLEARIFY LAPSCP

## (undated) DEVICE — MHPB BASIC LAP PACK: Brand: MEDLINE INDUSTRIES, INC.